# Patient Record
Sex: FEMALE | Race: BLACK OR AFRICAN AMERICAN | Employment: OTHER | URBAN - METROPOLITAN AREA
[De-identification: names, ages, dates, MRNs, and addresses within clinical notes are randomized per-mention and may not be internally consistent; named-entity substitution may affect disease eponyms.]

---

## 2017-10-24 ENCOUNTER — OFFICE VISIT (OUTPATIENT)
Dept: FAMILY MEDICINE CLINIC | Age: 57
End: 2017-10-24

## 2017-10-24 VITALS
TEMPERATURE: 97.6 F | HEART RATE: 83 BPM | WEIGHT: 203.2 LBS | BODY MASS INDEX: 37.39 KG/M2 | RESPIRATION RATE: 18 BRPM | OXYGEN SATURATION: 97 % | HEIGHT: 62 IN | SYSTOLIC BLOOD PRESSURE: 120 MMHG | DIASTOLIC BLOOD PRESSURE: 80 MMHG

## 2017-10-24 DIAGNOSIS — Z13.1 SCREENING FOR DIABETES MELLITUS: ICD-10-CM

## 2017-10-24 DIAGNOSIS — M06.9 RHEUMATOID ARTHRITIS, INVOLVING UNSPECIFIED SITE, UNSPECIFIED RHEUMATOID FACTOR PRESENCE: ICD-10-CM

## 2017-10-24 DIAGNOSIS — Z11.59 NEED FOR HEPATITIS C SCREENING TEST: ICD-10-CM

## 2017-10-24 DIAGNOSIS — Z13.220 SCREENING FOR LIPID DISORDERS: ICD-10-CM

## 2017-10-24 DIAGNOSIS — Z12.31 VISIT FOR SCREENING MAMMOGRAM: ICD-10-CM

## 2017-10-24 DIAGNOSIS — D64.9 ANEMIA, UNSPECIFIED TYPE: ICD-10-CM

## 2017-10-24 DIAGNOSIS — Z00.00 ROUTINE GENERAL MEDICAL EXAMINATION AT HEALTH CARE FACILITY: Primary | ICD-10-CM

## 2017-10-24 DIAGNOSIS — Z12.11 SCREENING FOR COLON CANCER: ICD-10-CM

## 2017-10-24 RX ORDER — FOLIC ACID 1 MG/1
1 TABLET ORAL DAILY
COMMUNITY
End: 2020-04-02 | Stop reason: SDUPTHER

## 2017-10-24 RX ORDER — LANOLIN ALCOHOL/MO/W.PET/CERES
325 CREAM (GRAM) TOPICAL AS NEEDED
COMMUNITY
End: 2018-03-27

## 2017-10-24 RX ORDER — METHOTREXATE 2.5 MG/1
20 TABLET ORAL
COMMUNITY
End: 2017-11-02 | Stop reason: SDUPTHER

## 2017-10-24 RX ORDER — MONTELUKAST SODIUM 10 MG/1
10 TABLET ORAL AS NEEDED
COMMUNITY
End: 2018-05-01

## 2017-10-24 RX ORDER — LANOLIN ALCOHOL/MO/W.PET/CERES
1000 CREAM (GRAM) TOPICAL AS NEEDED
COMMUNITY
End: 2018-03-27

## 2017-10-24 NOTE — PROGRESS NOTES
Patient Name: Neptali Marie   MRN: <[id number]>    Dk Leary is a 62 y.o. female who presents with the following: Here to establish care with new PCP. Cervical Cancer Screening: overdue, will refer to GYN. Is menopausal since 2010. Colon Cancer Screening: not up to date - FOBT ordered. Breast Cancer Screening: not up to date - ordered; had a false positive in her 42's and has not followed up since. Hep C: due     CAD risk factors:  HTN: wnl no meds; was on BP meds for one year in the past.  Lipid: due  DM: due    Hx of rheumatoid arthritis. Has seen a rheumatologist here who recommend switching from MTX to Enbrel but pt could not tolerate side effect of Enbrel. Currently taking methotrexate 20 mg every Monday but looking for a new rheumatologist.  Hx of anemia due to possibly low iron and low B12. Takes supplements sporadically. Review of Systems   Constitutional: Negative for chills, fever, malaise/fatigue and weight loss. HENT: Negative for hearing loss, nosebleeds and sore throat. Respiratory: Negative for cough, sputum production, shortness of breath and wheezing. Cardiovascular: Negative for chest pain, palpitations, leg swelling and PND. Gastrointestinal: Negative for abdominal pain, blood in stool, constipation, diarrhea, nausea and vomiting. Genitourinary: Negative for dysuria, frequency and urgency. Musculoskeletal: Negative for back pain, falls, joint pain, myalgias and neck pain. Skin: Negative for itching and rash. Neurological: Negative for dizziness, sensory change, focal weakness and loss of consciousness. Psychiatric/Behavioral: Negative for depression. The patient is not nervous/anxious. All other systems reviewed and are negative. The patient's medications, allergies, past medical history, surgical history, family history and social history were reviewed and updated where appropriate.       Prior to Admission medications Medication Sig Start Date End Date Taking? Authorizing Provider   methotrexate (RHEUMATREX) 2.5 mg tablet Take 20 mg by mouth every Monday. Yes Historical Provider   folic acid (FOLVITE) 1 mg tablet Take 1 mg by mouth daily. Indications: does not take on Mondays   Yes Historical Provider   montelukast (SINGULAIR) 10 mg tablet Take 10 mg by mouth as needed. Yes Historical Provider   Ibuprofen-diphenhydrAMINE (ADVIL PM) 200-38 mg tab Take 200 mg by mouth as needed. Yes Historical Provider   ferrous sulfate 325 mg (65 mg iron) tablet Take 325 mg by mouth as needed. Yes Historical Provider   cyanocobalamin 1,000 mcg tablet Take 1,000 mcg by mouth as needed. Yes Historical Provider   ginger, Zingiber officinalis, (GINGER EXTRACT) 250 mg cap Take 250 mg by mouth as needed. Yes Historical Provider   sennosides (SENNA) 8.6 mg cap Take 8.6 mg by mouth as needed. Yes Historical Provider       No Known Allergies      Past Medical History:   Diagnosis Date    Anemia     Rheumatoid arthritis ([facility] [address] 10/24/2017       History reviewed. No pertinent surgical history. Family History   Problem Relation Age of Onset    Heart Disease Father        Social History     Social History    Marital status:      Spouse name: N/A    Number of children: N/A    Years of education: N/A     Occupational History    Not on file.      Social History Main Topics    Smoking status: Former Smoker     Quit date: 2012    Smokeless tobacco: Never Used    Alcohol use No    Drug use: No    Sexual activity: No     Other Topics Concern    Not on file     Social History Narrative    No narrative on file           OBJECTIVE      Visit Vitals    /80 (BP 1 Location: Right arm, BP Patient Position: Sitting)    Pulse 83    Temp 97.6 °F (36.4 °C) (Oral)    Resp 18    Ht 5' 1.75\" (1.568 m)    Wt 203 lb 3.2 oz (92.2 kg)    SpO2 97%    BMI 37.47 kg/m2       Physical Exam   Constitutional: She is well-developed, well-nourished, and in no distress. No distress. HENT:   Head: Normocephalic and atraumatic. Right Ear: External ear normal.   Left Ear: External ear normal.   Eyes: Conjunctivae and EOM are normal. Pupils are equal, round, and reactive to light. Cardiovascular: Normal rate, regular rhythm and normal heart sounds. Exam reveals no gallop and no friction rub. No murmur heard. Pulmonary/Chest: Effort normal and breath sounds normal. No respiratory distress. She has no wheezes. Skin: She is not diaphoretic. Psychiatric: Mood, memory, affect and judgment normal.   Nursing note and vitals reviewed. ASSESSMENT AND PLAN  Brennen Winchester is a 62 y.o. female who presents today for:    1. Routine general medical examination at health care facility  Reviewed age appropriate screening tests as recommended by the [place]PSTF Preventive Services Database with patient today. 2. Screening for lipid disorders  Will calculate ASCVD risk score pending labs. - LIPID PANEL  - METABOLIC PANEL, COMPREHENSIVE    3. Screening for diabetes mellitus  - HEMOGLOBIN A1C WITH EAG    4. Need for hepatitis C screening test  - HCV AB W/RFLX TO CA    5. Visit for screening mammogram  - BALBIR MAMMO BI SCREENING INCL CAD; Future    6. Screening for colon cancer  - OCCULT BLOOD, IMMUNOASSAY (FIT)    7. Anemia, unspecified type  Stable, continue current treatment pending review of labs. - FERRITIN  - IRON PROFILE  - VITAMIN B12 & FOLATE  - CBC WITH AUTOMATED DIFF    8. Rheumatoid arthritis, involving unspecified site, unspecified rheumatoid factor presence ([facility] [place] [address]  Pt would like to meet with a new rheumatologist; pt may run out of MTX prior to establish care; stated I would be fine with refilling med in the interim.  - REFERRAL TO RHEUMATOLOGY       There are no discontinued medications. Follow-up Disposition:  Return in about 3 months (around 1/24/2018) for HTN follow up.     Medication risks/benefits/costs/interactions/alternatives discussed with patient. Advised patient to call back or return to office if symptoms worsen/change/persist. If patient cannot reach us or should anything more severe/urgent arise he/she should proceed directly to the nearest emergency department. Discussed expected course/resolution/complications of diagnosis in detail with patient. Patient given a written after visit summary which includes his/her diagnoses, current medications and vitals. Patient expressed understanding with the diagnosis and plan.      Racquel New M.D.

## 2017-10-24 NOTE — PATIENT INSTRUCTIONS
Well Visit, Women 48 to 72: Care Instructions  Your Care Instructions  Physical exams can help you stay healthy. Your doctor has checked your overall health and may have suggested ways to take good care of yourself. He or she also may have recommended tests. At home, you can help prevent illness with healthy eating, regular exercise, and other steps. Follow-up care is a key part of your treatment and safety. Be sure to make and go to all appointments, and call your doctor if you are having problems. It's also a good idea to know your test results and keep a list of the medicines you take. How can you care for yourself at home? · Reach and stay at a healthy weight. This will lower your risk for many problems, such as obesity, diabetes, heart disease, and high blood pressure. · Get at least 30 minutes of exercise on most days of the week. Walking is a good choice. You also may want to do other activities, such as running, swimming, cycling, or playing tennis or team sports. · Do not smoke. Smoking can make health problems worse. If you need help quitting, talk to your doctor about stop-smoking programs and medicines. These can increase your chances of quitting for good. · Protect your skin from too much sun. When you're outdoors from 10 a.m. to 4 p.m., stay in the shade or cover up with clothing and a hat with a wide brim. Wear sunglasses that block UV rays. Even when it's cloudy, put broad-spectrum sunscreen (SPF 30 or higher) on any exposed skin. · See a dentist one or two times a year for checkups and to have your teeth cleaned. · Wear a seat belt in the car. · Limit alcohol to 1 drink a day. Too much alcohol can cause health problems. Follow your doctor's advice about when to have certain tests. These tests can spot problems early. · Cholesterol.  Your doctor will tell you how often to have this done based on your age, family history, or other things that can increase your risk for heart attack and stroke. · Blood pressure. Have your blood pressure checked during a routine doctor visit. Your doctor will tell you how often to check your blood pressure based on your age, your blood pressure results, and other factors. · Mammogram. Ask your doctor how often you should have a mammogram, which is an X-ray of your breasts. A mammogram can spot breast cancer before it can be felt and when it is easiest to treat. · Pap test and pelvic exam. Ask your doctor how often you should have a Pap test. You may not need to have a Pap test as often as you used to. · Vision. Have your eyes checked every year or two or as often as your doctor suggests. Some experts recommend that you have yearly exams for glaucoma and other age-related eye problems starting at age 48. · Hearing. Tell your doctor if you notice any change in your hearing. You can have tests to find out how well you hear. · Diabetes. Ask your doctor whether you should have tests for diabetes. · Colon cancer. You should begin tests for colon cancer at age 48. You may have one of several tests. Your doctor will tell you how often to have tests based on your age and risk. Risks include whether you already had a precancerous polyp removed from your colon or whether your parents, sisters and brothers, or children have had colon cancer. · Thyroid disease. Talk to your doctor about whether to have your thyroid checked as part of a regular physical exam. Women have an increased chance of a thyroid problem. · Osteoporosis. You should begin tests for bone density at age 72. If you are younger than 72, ask your doctor whether you have factors that may increase your risk for this disease. You may want to have this test before age 72. · Heart attack and stroke risk. At least every 4 to 6 years, you should have your risk for heart attack and stroke assessed.  Your doctor uses factors such as your age, blood pressure, cholesterol, and whether you smoke or have diabetes to show what your risk for a heart attack or stroke is over the next 10 years. When should you call for help? Watch closely for changes in your health, and be sure to contact your doctor if you have any problems or symptoms that concern you. Where can you learn more? Go to http://saw-fifi.info/. Enter O260 in the search box to learn more about \"Well Visit, Women 50 to 72: Care Instructions. \"  Current as of: July 19, 2016  Content Version: 11.3  © 1149-0874 Healthwise, Incorporated. Care instructions adapted under license by [employer] (which disclaims liability or warranty for this information). If you have questions about a medical condition or this instruction, always ask your healthcare professional. [address] any warranty or liability for your use of this information.

## 2017-10-24 NOTE — PROGRESS NOTES
Chief Complaint   Patient presents with    New Patient    Complete Physical     1. Have you been to the ER, urgent care clinic since your last visit? Hospitalized since your last visit? No    2. Have you seen or consulted any other health care providers outside of the [address] [place] [address] since your last visit? Include any pap smears or colon screening. Yes, saw Dr. Farhad Vinson 06/17.

## 2017-10-24 NOTE — MR AVS SNAPSHOT
Visit Information Date & Time Provider Department Dept. Phone Encounter #  
 10/24/2017  2:45 PM Gretchen Cox MD [address] [place] [address] [phone] [id number] Follow-up Instructions Return in about 3 months (around 1/24/2018) for HTN follow up. Upcoming Health Maintenance Date Due Hepatitis C Screening 1960 DTaP/Tdap/Td series (1 - Tdap) 9/3/1981 PAP AKA CERVICAL CYTOLOGY 9/3/1981 BREAST CANCER SCRN MAMMOGRAM 9/3/2010 FOBT Q 1 YEAR AGE 50-75 9/3/2010 Allergies as of 10/24/2017  Review Complete On: 10/24/2017 By: Gretchen Cox MD  
 No Known Allergies Current Immunizations  Never Reviewed No immunizations on file. Not reviewed this visit You Were Diagnosed With   
  
 Codes Comments Routine general medical examination at health care facility    -  Primary ICD-10-CM: Z00.00 ICD-9-CM: V70.0 Screening for lipid disorders     ICD-10-CM: Z13.220 ICD-9-CM: V77.91 Screening for diabetes mellitus     ICD-10-CM: Z13.1 ICD-9-CM: V77.1 Need for hepatitis C screening test     ICD-10-CM: Z11.59 
ICD-9-CM: V73.89 Visit for screening mammogram     ICD-10-CM: Z12.31 
ICD-9-CM: V76.12 Screening for colon cancer     ICD-10-CM: Z12.11 ICD-9-CM: V76.51 Anemia, unspecified type     ICD-10-CM: D64.9 ICD-9-CM: 203. 9 Rheumatoid arthritis, involving unspecified site, unspecified rheumatoid factor presence ([facility]     ICD-10-CM: M06.9 ICD-9-CM: 714.0 Vitals BP Pulse Temp Resp Height(growth percentile) Weight(growth percentile) 120/80 (BP 1 Location: Right arm, BP Patient Position: Sitting) 83 97.6 °F (36.4 °C) (Oral) 18 5' 1.75\" (1.568 m) 203 lb 3.2 oz (92.2 kg) SpO2 BMI OB Status Smoking Status 97% 37.47 kg/m2 Menopause Former Smoker Vitals History BMI and BSA Data Body Mass Index Body Surface Area  
 37.47 kg/m 2 2 m 2 Preferred Pharmacy Pharmacy Name Phone [facility] [place], [address] [facility] [phone] Your Updated Medication List  
  
   
This list is accurate as of: 10/24/17  3:11 PM.  Always use your most recent med list. ADVIL -38 mg Tab Generic drug:  Ibuprofen-diphenhydrAMINE Take 200 mg by mouth as needed. cyanocobalamin 1,000 mcg tablet Take 1,000 mcg by mouth as needed. ferrous sulfate 325 mg (65 mg iron) tablet Take 325 mg by mouth as needed. folic acid 1 mg tablet Commonly known as:  [employer] Take 1 mg by mouth daily. Indications: does not take on Mondays GINGER EXTRACT 250 mg Cap Generic drug:  ginger (Zingiber officinalis) Take 250 mg by mouth as needed. methotrexate 2.5 mg tablet Commonly known as:  Skip Rachel Take 20 mg by mouth every Monday. Senna 8.6 mg Cap Generic drug:  sennosides Take 8.6 mg by mouth as needed. SINGULAIR 10 mg tablet Generic drug:  montelukast  
Take 10 mg by mouth as needed. We Performed the Following CBC WITH AUTOMATED DIFF [66234 CPT(R)] FERRITIN [78067 CPT(R)] HCV AB W/RFLX TO CA [49134 CPT(R)] HEMOGLOBIN A1C WITH EAG [31590 CPT(R)] IRON PROFILE [id number] CPT(R)] LIPID PANEL [41167 CPT(R)] METABOLIC PANEL, COMPREHENSIVE [79296 CPT(R)] OCCULT BLOOD, IMMUNOASSAY (FIT) [id number] CPT(R)] REFERRAL TO RHEUMATOLOGY [ORP84 Custom] Comments:  
 Please evaluate patient for RA. VITAMIN B12 & FOLATE [66476 CPT(R)] Follow-up Instructions Return in about 3 months (around 1/24/2018) for HTN follow up. To-Do List   
 10/24/2017 Imaging:  BALBIR MAMMO BI SCREENING INCL CAD Referral Information Referral ID Referred By Referred To  
  
 [id number] [place], [address] Armijo MD   
   [address], [address] Phone: [phone] Fax: [phone] Visits Status Start Date End Date 1 New Request 10/24/17 10/24/18 If your referral has a status of pending review or denied, additional information will be sent to support the outcome of this decision. Patient Instructions Well Visit, Women 48 to 72: Care Instructions Your Care Instructions Physical exams can help you stay healthy. Your doctor has checked your overall health and may have suggested ways to take good care of yourself. He or she also may have recommended tests. At home, you can help prevent illness with healthy eating, regular exercise, and other steps. Follow-up care is a key part of your treatment and safety. Be sure to make and go to all appointments, and call your doctor if you are having problems. It's also a good idea to know your test results and keep a list of the medicines you take. How can you care for yourself at home? · Reach and stay at a healthy weight. This will lower your risk for many problems, such as obesity, diabetes, heart disease, and high blood pressure. · Get at least 30 minutes of exercise on most days of the week. Walking is a good choice. You also may want to do other activities, such as running, swimming, cycling, or playing tennis or team sports. · Do not smoke. Smoking can make health problems worse. If you need help quitting, talk to your doctor about stop-smoking programs and medicines. These can increase your chances of quitting for good. · Protect your skin from too much sun. When you're outdoors from 10 a.m. to 4 p.m., stay in the shade or cover up with clothing and a hat with a wide brim. Wear sunglasses that block UV rays. Even when it's cloudy, put broad-spectrum sunscreen (SPF 30 or higher) on any exposed skin. · See a dentist one or two times a year for checkups and to have your teeth cleaned. · Wear a seat belt in the car. · Limit alcohol to 1 drink a day. Too much alcohol can cause health problems. Follow your doctor's advice about when to have certain tests. These tests can spot problems early. · Cholesterol. Your doctor will tell you how often to have this done based on your age, family history, or other things that can increase your risk for heart attack and stroke. · Blood pressure. Have your blood pressure checked during a routine doctor visit. Your doctor will tell you how often to check your blood pressure based on your age, your blood pressure results, and other factors. · Mammogram. Ask your doctor how often you should have a mammogram, which is an X-ray of your breasts. A mammogram can spot breast cancer before it can be felt and when it is easiest to treat. · Pap test and pelvic exam. Ask your doctor how often you should have a Pap test. You may not need to have a Pap test as often as you used to. · Vision. Have your eyes checked every year or two or as often as your doctor suggests. Some experts recommend that you have yearly exams for glaucoma and other age-related eye problems starting at age 48. · Hearing. Tell your doctor if you notice any change in your hearing. You can have tests to find out how well you hear. · Diabetes. Ask your doctor whether you should have tests for diabetes. · Colon cancer. You should begin tests for colon cancer at age 48. You may have one of several tests. Your doctor will tell you how often to have tests based on your age and risk. Risks include whether you already had a precancerous polyp removed from your colon or whether your parents, sisters and brothers, or children have had colon cancer. · Thyroid disease. Talk to your doctor about whether to have your thyroid checked as part of a regular physical exam. Women have an increased chance of a thyroid problem. · Osteoporosis. You should begin tests for bone density at age 72.  If you are younger than 72, ask your doctor whether you have factors that may increase your risk for this disease. You may want to have this test before age 72. · Heart attack and stroke risk. At least every 4 to 6 years, you should have your risk for heart attack and stroke assessed. Your doctor uses factors such as your age, blood pressure, cholesterol, and whether you smoke or have diabetes to show what your risk for a heart attack or stroke is over the next 10 years. When should you call for help? Watch closely for changes in your health, and be sure to contact your doctor if you have any problems or symptoms that concern you. Where can you learn more? Go to http://saw-fifi.info/. Enter H189 in the search box to learn more about \"Well Visit, Women 50 to 72: Care Instructions. \" Current as of: July 19, 2016 Content Version: 11.3 © 9080-3522 Healthwise, Incorporated. Care instructions adapted under license by [employer] (which disclaims liability or warranty for this information). If you have questions about a medical condition or this instruction, always ask your healthcare professional. [place] [address] any warranty or liability for your use of this information. Introducing [facility]! [facility] introduces [employer] patient portal. Now you can access parts of your medical record, email your doctor's office, and request medication refills online. 1. In your internet browser, go to [email or web address] 2. Click on the First Time User? Click Here link in the Sign In box. You will see the New Member Sign Up page. 3. Enter your [employer] Access Code exactly as it appears below. You will not need to use this code after youve completed the sign-up process. If you do not sign up before the expiration date, you must request a new code. · [employer] Access Code: [email or web address] Expires: 1/22/2018  3:00 PM 
 
4.  Enter the last four digits of your Social Security Number (xxxx) and Date of Birth (mm/dd/yyyy) as indicated and click Submit. You will be taken to the next sign-up page. 5. Create a Gift[employer] ID. This will be your [employer] login ID and cannot be changed, so think of one that is secure and easy to remember. 6. Create a [employer] password. You can change your password at any time. 7. Enter your Password Reset Question and Answer. This can be used at a later time if you forget your password. 8. Enter your e-mail address. You will receive e-mail notification when new information is available in [address] 9. Click Sign Up. You can now view and download portions of your medical record. 10. Click the Download Summary menu link to download a portable copy of your medical information. If you have questions, please visit the Frequently Asked Questions section of the [employer] website. Remember, [employer] is NOT to be used for urgent needs. For medical emergencies, dial 911. Now available from your iPhone and Android! Please provide this summary of care documentation to your next provider. Your primary care clinician is listed as Dex Denny. If you have any questions after today's visit, please call [phone].

## 2017-11-02 NOTE — TELEPHONE ENCOUNTER
Patient presented in office today requesting refill of methotrexate.     Patient has appointment with Dr. Yasmeen Samuel 12/27/2017 9:00 AM

## 2017-11-02 NOTE — TELEPHONE ENCOUNTER
Can you clarify which mg tabs pt has? I believe her current dose is 20 mg by mouth every Monday; if so, I can send in 10 mg tabs so she just needs to take 2 tabs at once (=20 mg). The chart says she takes 2.5 mg which would be 8 tabs at once but not sure if that is an error.

## 2017-11-03 ENCOUNTER — TELEPHONE (OUTPATIENT)
Dept: FAMILY MEDICINE CLINIC | Age: 57
End: 2017-11-03

## 2017-11-03 LAB
ALBUMIN SERPL-MCNC: 4 G/DL (ref 3.5–5.5)
ALBUMIN/GLOB SERPL: 1.4 {RATIO} (ref 1.2–2.2)
ALP SERPL-CCNC: 102 IU/L (ref 39–117)
ALT SERPL-CCNC: 14 IU/L (ref 0–32)
AST SERPL-CCNC: 14 IU/L (ref 0–40)
BASOPHILS # BLD AUTO: 0 X10E3/UL (ref 0–0.2)
BASOPHILS NFR BLD AUTO: 1 %
BILIRUB SERPL-MCNC: 0.5 MG/DL (ref 0–1.2)
BUN SERPL-MCNC: 17 MG/DL (ref 6–24)
BUN/CREAT SERPL: 19 (ref 9–23)
CALCIUM SERPL-MCNC: 9.1 MG/DL (ref 8.7–10.2)
CHLORIDE SERPL-SCNC: 102 MMOL/L (ref 96–106)
CHOLEST SERPL-MCNC: 224 MG/DL (ref 100–199)
CO2 SERPL-SCNC: 25 MMOL/L (ref 18–29)
CREAT SERPL-MCNC: 0.9 MG/DL (ref 0.57–1)
EOSINOPHIL # BLD AUTO: 0.2 X10E3/UL (ref 0–0.4)
EOSINOPHIL NFR BLD AUTO: 3 %
ERYTHROCYTE [DISTWIDTH] IN BLOOD BY AUTOMATED COUNT: 14.6 % (ref 12.3–15.4)
EST. AVERAGE GLUCOSE BLD GHB EST-MCNC: 100 MG/DL
FERRITIN SERPL-MCNC: 244 NG/ML (ref 15–150)
FOLATE SERPL-MCNC: 14.7 NG/ML
GFR SERPLBLD CREATININE-BSD FMLA CKD-EPI: 71 ML/MIN/1.73
GFR SERPLBLD CREATININE-BSD FMLA CKD-EPI: 82 ML/MIN/1.73
GLOBULIN SER CALC-MCNC: 2.8 G/DL (ref 1.5–4.5)
GLUCOSE SERPL-MCNC: 84 MG/DL (ref 65–99)
HBA1C MFR BLD: 5.1 % (ref 4.8–5.6)
HCT VFR BLD AUTO: 36.1 % (ref 34–46.6)
HCV AB S/CO SERPL IA: 0.1 S/CO RATIO (ref 0–0.9)
HCV AB SERPL QL IA: NORMAL
HDLC SERPL-MCNC: 57 MG/DL
HEMOCCULT STL QL IA: POSITIVE
HGB BLD-MCNC: 11.6 G/DL (ref 11.1–15.9)
IMM GRANULOCYTES # BLD: 0 X10E3/UL (ref 0–0.1)
IMM GRANULOCYTES NFR BLD: 0 %
INTERPRETATION, 910389: NORMAL
IRON SATN MFR SERPL: 25 % (ref 15–55)
IRON SERPL-MCNC: 71 UG/DL (ref 27–159)
LDLC SERPL CALC-MCNC: 149 MG/DL (ref 0–99)
LYMPHOCYTES # BLD AUTO: 0.9 X10E3/UL (ref 0.7–3.1)
LYMPHOCYTES NFR BLD AUTO: 14 %
MCH RBC QN AUTO: 29.4 PG (ref 26.6–33)
MCHC RBC AUTO-ENTMCNC: 32.1 G/DL (ref 31.5–35.7)
MCV RBC AUTO: 92 FL (ref 79–97)
MONOCYTES # BLD AUTO: 0.5 X10E3/UL (ref 0.1–0.9)
MONOCYTES NFR BLD AUTO: 8 %
NEUTROPHILS # BLD AUTO: 4.7 X10E3/UL (ref 1.4–7)
NEUTROPHILS NFR BLD AUTO: 74 %
PLATELET # BLD AUTO: 313 X10E3/UL (ref 150–379)
POTASSIUM SERPL-SCNC: 4.7 MMOL/L (ref 3.5–5.2)
PROT SERPL-MCNC: 6.8 G/DL (ref 6–8.5)
RBC # BLD AUTO: 3.94 X10E6/UL (ref 3.77–5.28)
SODIUM SERPL-SCNC: 142 MMOL/L (ref 134–144)
TIBC SERPL-MCNC: 285 UG/DL (ref 250–450)
TRIGL SERPL-MCNC: 90 MG/DL (ref 0–149)
UIBC SERPL-MCNC: 214 UG/DL (ref 131–425)
VIT B12 SERPL-MCNC: 548 PG/ML (ref 211–946)
VLDLC SERPL CALC-MCNC: 18 MG/DL (ref 5–40)
WBC # BLD AUTO: 6.2 X10E3/UL (ref 3.4–10.8)

## 2017-11-03 NOTE — PROGRESS NOTES
Please notify patient regarding their test results:    High total/LDL chol; I would encourage healthy diets and regular exercise with the goal of healthy weight loss before starting medications for this. Hep C negative. No DM. No anemia.

## 2017-11-03 NOTE — TELEPHONE ENCOUNTER
Called patient.  verified. Requested patient to stop by the office at her convenience, to sign an Authorization to Yolanda Watson  form so we can request some of her medical records. Patient said she will try to stop by next week.

## 2017-11-06 DIAGNOSIS — Z12.11 COLON CANCER SCREENING: Primary | ICD-10-CM

## 2017-11-06 DIAGNOSIS — R19.5 POSITIVE FECAL OCCULT BLOOD TEST: ICD-10-CM

## 2017-11-06 NOTE — PROGRESS NOTES
Please notify patient regarding their test results:    FOBT positive for blood; recommend GI referral (ordered)

## 2017-11-08 NOTE — PROGRESS NOTES
Called patient and verified . Informed patient of FOBT results and GI referral and the GI doctor\"s name,address, and telephone number. Dr. Thom Winkler. Patient stated she will schedule her appointment.

## 2017-11-08 NOTE — PROGRESS NOTES
Called Patient verified  and informed of lab results as ordered per Dr. Bard Tristan. Patient acknowledged understanding.

## 2017-12-12 ENCOUNTER — HOSPITAL ENCOUNTER (OUTPATIENT)
Dept: MAMMOGRAPHY | Age: 57
Discharge: HOME | End: 2017-12-12
Attending: FAMILY MEDICINE
Payer: MEDICARE

## 2017-12-12 DIAGNOSIS — Z12.31 VISIT FOR SCREENING MAMMOGRAM: ICD-10-CM

## 2017-12-12 PROCEDURE — 77067 SCR MAMMO BI INCL CAD: CPT

## 2017-12-27 ENCOUNTER — OFFICE VISIT (OUTPATIENT)
Dept: RHEUMATOLOGY | Age: 57
End: 2017-12-27

## 2017-12-27 VITALS
SYSTOLIC BLOOD PRESSURE: 124 MMHG | WEIGHT: 204 LBS | BODY MASS INDEX: 40.05 KG/M2 | HEIGHT: 60 IN | TEMPERATURE: 98.2 F | DIASTOLIC BLOOD PRESSURE: 86 MMHG | RESPIRATION RATE: 18 BRPM | HEART RATE: 83 BPM

## 2017-12-27 DIAGNOSIS — K92.1 HEMATOCHEZIA: ICD-10-CM

## 2017-12-27 DIAGNOSIS — Z79.60 LONG-TERM USE OF IMMUNOSUPPRESSANT MEDICATION: ICD-10-CM

## 2017-12-27 DIAGNOSIS — M06.9 RHEUMATOID ARTHRITIS WITH UNKNOWN RHEUMATOID FACTOR STATUS (HCC): Primary | ICD-10-CM

## 2017-12-27 DIAGNOSIS — M17.0 PRIMARY OSTEOARTHRITIS OF BOTH KNEES: ICD-10-CM

## 2017-12-27 DIAGNOSIS — M25.521 RIGHT ELBOW PAIN: ICD-10-CM

## 2017-12-27 RX ORDER — METHOTREXATE 2.5 MG/1
20 TABLET ORAL
Qty: 96 TAB | Refills: 0 | Status: SHIPPED | OUTPATIENT
Start: 2018-01-01 | End: 2017-12-29 | Stop reason: SDUPTHER

## 2017-12-27 RX ORDER — TRIAMCINOLONE ACETONIDE 40 MG/ML
40 INJECTION, SUSPENSION INTRA-ARTICULAR; INTRAMUSCULAR ONCE
Qty: 1 ML | Refills: 0
Start: 2017-12-27 | End: 2017-12-27

## 2017-12-27 RX ORDER — LIDOCAINE HYDROCHLORIDE 10 MG/ML
1 INJECTION, SOLUTION EPIDURAL; INFILTRATION; INTRACAUDAL; PERINEURAL ONCE
Qty: 1 ML | Refills: 0
Start: 2017-12-27 | End: 2017-12-27

## 2017-12-27 RX ORDER — METHOTREXATE 2.5 MG/1
20 TABLET ORAL
COMMUNITY
End: 2017-12-27 | Stop reason: SDUPTHER

## 2017-12-27 NOTE — MR AVS SNAPSHOT
Visit Information Date & Time Provider Department Dept. Phone Encounter #  
 12/27/2017  9:00 AM Mp Reyes, [address] [place] [id number] Follow-up Instructions Return in about 3 months (around 3/27/2018). Your Appointments 1/25/2018  9:45 AM  
ROUTINE CARE with Nathan Amado MD  
[facility]) Appt Note: 6 month follow up visit; 3 mo follow up  
 [phone]  
[phone]  
  
   
 [address] [phone] Upcoming Health Maintenance Date Due DTaP/Tdap/Td series (1 - Tdap) 9/3/1981 PAP AKA CERVICAL CYTOLOGY 9/3/1981 FOBT Q 1 YEAR AGE 50-75 11/2/2018 Allergies as of 12/27/2017  Review Complete On: 12/27/2017 By: Mp Reyes MD  
 No Known Allergies Current Immunizations  Never Reviewed No immunizations on file. Not reviewed this visit You Were Diagnosed With   
  
 Codes Comments Rheumatoid arthritis with unknown [facility] factor [facility]    -  Primary ICD-10-CM: M06.9 ICD-9-CM: 714.0 Long-term use of immunosuppressant medication     ICD-10-CM: Z79.899 ICD-9-CM: V58.69 Right elbow pain     ICD-10-CM: M25.521 ICD-9-CM: 719.42 Vitals BP Pulse Temp Resp Height(growth percentile) Weight(growth percentile) 124/86 (BP 1 Location: Right arm, BP Patient Position: Sitting) 83 98.2 °F (36.8 °C) 18 5' (1.524 m) 204 lb (92.5 kg) BMI OB Status Smoking Status 39.84 kg/m2 Menopause Former Smoker BMI and BSA Data Body Mass Index Body Surface Area  
 39.84 kg/m 2 1.98 m 2 Preferred Pharmacy Pharmacy Name Phone [facility] [address] [phone] Your Updated Medication List  
  
   
This list is accurate as of: 12/27/17  9:41 AM.  Always use your most recent med list.  
  
  
  
  
 ADVIL -38 mg Tab Generic drug:  Ibuprofen-diphenhydrAMINE Take 200 mg by mouth as needed. cyanocobalamin 1,000 mcg tablet Take 1,000 mcg by mouth as needed. ferrous sulfate 325 mg (65 mg iron) tablet Take 325 mg by mouth as needed. folic acid 1 mg tablet Commonly known as:  Google Take 1 mg by mouth daily. Indications: does not take on Mondays  
  
 lidocaine (PF) 10 mg/mL (1 %) injection Commonly known as:  XYLOCAINE  
1 mL by Intra artICUlar route once for 1 dose. methotrexate 2.5 mg tablet Commonly known as:  Willma Bellis Take 20 mg by mouth every Monday. Senna 8.6 mg Cap Generic drug:  sennosides Take 8.6 mg by mouth as needed. SINGULAIR 10 mg tablet Generic drug:  montelukast  
Take 10 mg by mouth as needed. tofacitinib 11 mg Tb24 Commonly known as:  XELJANZ XR Take 11 mg by mouth daily for 30 days. triamcinolone acetonide 40 mg/mL injection Commonly known as:  KENALOG  
1 mL by Intra artICUlar route once for 1 dose. Prescriptions Sent to Pharmacy Refills  
 tofacitinib (XELJANZ XR) 11 mg Tb24 11 Sig: Take 11 mg by mouth daily for 30 days. Class: Normal  
 Pharmacy: [address] Ph #: [phone] Route: Oral  
  
We Performed the Following C REACTIVE PROTEIN, QT [27655 CPT(R)] CBC WITH AUTOMATED DIFF [71033 CPT(R)] CHRONIC HEPATITIS PANEL [PTF7752 Custom] [address], IGG [id number] CPT(R)] METABOLIC PANEL, COMPREHENSIVE [64825 CPT(R)] ME ARTHROCENTESIS ASPIR&/INJ INTERM JT/BURS W/US [80782 CPT(R)] PROTEIN ELECTROPHORESIS W/ REFLX CARLITO [[id number] Custom] QUANTIFERON TB GOLD [[id number] Custom] RHEUMATOID FACTOR, QL [id number] CPT(R)] SED RATE (ESR) R7007837 CPT(R)] TRIAMCINOLONE ACETONIDE INJ [ [facility]] URIC ACID Z4936913 CPT(R)] VITAMIN D, 25 HYDROXY W1926020 CPT(R)] Follow-up Instructions Return in about 3 months (around 3/27/2018). To-Do List   
 12/27/2017 Imaging:  XR ELBOW RT MIN 3 V   
  
 12/27/2017 Imaging:  XR FOOT LT MIN 3 V   
  
 12/27/2017 Imaging:  XR FOOT RT MIN 3 V   
  
 12/27/2017 Imaging:  XR HAND LT MIN 3 V   
  
 12/27/2017 Imaging:  XR HAND RT MIN 3 V Introducing hospitals & HEALTH SERVICES! Dear Kaz Albert: 
Thank you for requesting a [employer] account. Our records indicate that you already have an active Mobile-[employer] account. You can access your account anytime at [email or web address] Did you know that you can access your hospital and ER discharge instructions at any time in [employer]? You can also review all of your test results from your hospital stay or ER visit. Additional Information If you have questions, please visit the Frequently Asked Questions section of the Mobile-[employer] website at [email or web address] Remember, [employer] is NOT to be used for urgent needs. For medical emergencies, dial 911. Now available from your iPhone and Android! Please provide this summary of care documentation to your next provider. Your primary care clinician is listed as Dex Denny. If you have any questions after today's visit, please call [phone].

## 2017-12-27 NOTE — PROGRESS NOTES
REASON FOR VISIT    This is the initial evaluation for Ms. Roe a 62 y.o. Native [place] and [place] American female for question of an inflammatory arthritis. The patient is referred to the Arthritis and [address] [place] [address] at the request of Dr. Noelle Gooden. HISTORY OF PRESENT ILLNESS      I have reviewed and summarized old records from [facility]    In 3/2015, she developed right shoulder that progressed to the other other arm amd bilateral ankle pain. She could not lift her arms above her head. She denied swelling. She has seen a rheumatologist in [facility] who diagnosed her with Rheumatoid Arthritis, who started her on methotrexate 20 mg weekly with good tolerance. In 2016, she moved back to [place] and had established care with a rheumatologist, who started her on Enbrel due bad radiographs but she reports not feeling well on it but cannot explain how she felt on it. She had taken it for 6 weeks and noted injection site bruising and increasing coughing. She notes the coughing improved after stopping Enbrel. She has had a history of intermittent numbness in the balls of her right foot which improves when she wears a copper sleeve. In 11/02/2017, labs showed WBC 6.2, lymphocytes 0.9, Hct 36.1%, platelets 239,117, creatinine 0.90 mg/dL, eGFR 82, albumin 4.0 g/dL,  U/L, ALT 14 U/L, AST 14 U/L, negative HCV. She was a smoker. She is scheduling for a colonoscopy next month. Today, she complains of aching pain in her right shoulder down to her hand. She feels that her right hand and arm is swollen. If she tried to put force like opening an object, she has pain in her forearm. She denies stiffness. She has had this for months. She has not used any NSAIDs.      Therapy History includes:    Current DMARD therapy includes: methotrexate 20 mg every Monday  Prior DMARD therapy includes: Enbrel (6 weeks)  The following DMARDs have been ineffective: none  The following DMARDs were stopped because of side effects: Enbrel (injection site reaction, increased cough, other unknown)    REVIEW OF SYSTEMS    A 15 point review of systems was performed and summarized below. The questionnaire was reviewed with the patient and scanned into the patient's medical record.     General: denies recent weight gain, recent weight loss, fatigue, weakness, fever, night sweats  Musculoskeletal: endorses joint pain, joint swelling, denies morning stiffness, muscle weakness  Ears: endorses ringing in ears, denies loss of hearing, deafness  Eyes: endorses foreign body sensation, denies pain, redness, loss of vision, double vision, blurred vision, dryness  Mouth: denies sore tongue, oral ulcers, bleeding gums, loss of taste, dryness, increased dental caries  Nose: denies nosebleeds, loss of smell, nasal ulcers  Throat: denies frequent sore throats, hoarseness, difficulty in swallowing, pain in jaw while chewing  Neck: denies swollen glands, tender glands  Cardiopulmonary: endorses dry cough, denies pain in chest, irregular heart beat, sudden changes in heart beat, shortness of breath, difficulty breathing at night, swollen legs or feet, coughing of blood, wheezing  Gastrointestinal: endorses blood in stools,denies nausea, heartburn, stomach pain relieved by food, vomiting of blood/\"coffee grounds\", jaundice, increasing constipation, persistent diarrhea,  black stools  Genitourinary: endorses getting up at night to pass urine, denies difficult urination, pain or burning on urination, blood in urine, cloudy urine, pus in urine, genital discharge, frequent urination, vaginal dryness, rash/ulcers, sexual difficulties   Hematologic: denies anemia, bleeding tendency, blood clots  Skin: endorses easy bruising, denies redness, rash, hives, sun sensitive, skin tightness, nodules/bumps, hair loss, color changes of hands or feet in the cold (Raynaud's)  Neurologic: endorses intermittent numbness or tingling in right foot feet, denies headaches, dizziness, fainting or loss of consciousness, memory loss, muscle weakness  Psychiatric: denies depression, excessive worries  Sleep: endorses poor sleep (5 hours), snoring, denies apnea, daytime somnolence, difficulty falling asleep, difficulty staying asleep     PAST MEDICAL HISTORY    She has a past medical history of Anemia; Reactive airway disease; and Rheumatoid arthritis ([facility] (10/24/2017). FAMILY HISTORY    Her family history includes Heart Disease in her father; High Cholesterol in her mother; Thyroid Disease in her mother. SOCIAL HISTORY    She reports that she quit smoking about 5 years ago. She has never used smokeless tobacco. She reports that she does not drink alcohol or use illicit drugs. GYNECOLOGIC HISTORY     5, Para 5, Living 5, Miscarriage 0    She denies severe pre-eclampsia, eclampsia or placental insufficiency    HEALTH MAINTENANCE    Immunizations    There is no immunization history on file for this patient. Age Appropriate Cancer Screening    Mammogram: 2017    MEDICATIONS    Current Outpatient Prescriptions   Medication Sig Dispense Refill    triamcinolone acetonide (KENALOG) 40 mg/mL injection 1 mL by Intra artICUlar route once for 1 dose. 1 mL 0    lidocaine, PF, (XYLOCAINE) 10 mg/mL (1 %) injection 1 mL by Intra artICUlar route once for 1 dose. 1 mL 0    tofacitinib (XELJANZ XR) 11 mg Tb24 Take 11 mg by mouth daily for 30 days. 30 Tab 11    [START ON 2018] methotrexate (RHEUMATREX) 2.5 mg tablet Take 8 Tabs by mouth every Monday for 90 days. 96 Tab 0    folic acid (FOLVITE) 1 mg tablet Take 1 mg by mouth daily. Indications: does not take on       montelukast (SINGULAIR) 10 mg tablet Take 10 mg by mouth as needed.  Ibuprofen-diphenhydrAMINE (ADVIL PM) 200-38 mg tab Take 200 mg by mouth as needed.  ferrous sulfate 325 mg (65 mg iron) tablet Take 325 mg by mouth as needed.       cyanocobalamin 1,000 mcg tablet Take 1,000 mcg by mouth as needed.  sennosides (SENNA) 8.6 mg cap Take 8.6 mg by mouth as needed. ALLERGIES    No Known Allergies    PHYSICAL EXAMINATION    Visit Vitals    /86 (BP 1 Location: Right arm, BP Patient Position: Sitting)    Pulse 83    Temp 98.2 °F (36.8 °C)    Resp 18    Ht 5' (1.524 m)    Wt 204 lb (92.5 kg)    BMI 39.84 kg/m2     Body mass index is 39.84 kg/(m^2). General: Patient is alert, oriented x 3, not in acute distress    HEENT:   Conjunctiva are not injected and appear moist, oral mucous membranes are moist, there are no ulcers present, there is no alopecia, neck is supple, there is no lymphadenopathy. Salivary glands are normal    Cardiovascular:  Heart is regular rate and rhythm, no murmurs. Chest:  Lungs are clear to auscultation bilaterally. Abdomen:  Soft, non-tender    Extremities:  Free of clubbing, cyanosis, edema, extremities well perfused. Neurological exam:  No focal sensory deficits, muscle strength is full in upper and lower extremities. Skin exam:  There are no rashes, no tophi, no psoriasis, no active Raynaud's, no livedo reticularis, no periungual erythema. Musculoskeletal exam:  A comprehensive musculoskeletal exam was performed for all joints of each upper and lower extremity and assessed for swelling, tenderness and range of motion.  Pertinent results are documented as below:    Decreased active and passive ROM of right shoulder due to pain  Right elbow flexion contracture    Bilateral knee crepitus without effusion with hyperextension    Z-Deformities:   no  [place] Neck Deformities:  no  Boutonierre's Deformities:  no  Ulnar Deviation:   no  MCP Subluxation:  no    Joint Count 12/27/2017   Patient pain (0-100) 25   MHAQ 0.125   Right elbow - Tender 1   Right elbow - Swollen 1   Tender Joint Count (Total) 1   Swollen Joint Count (Total) 1   Physician Assessment (0-10) 1   Patient Assessment (0-10) 3   CDAI Total (calculated) 6 MUSCULOSKELETAL ULTRASOUND EVALUATION    Ultrasound of the right elbow. Indication: joint pain. (12/27/17)    Using a MBDC Mediaiq e with 12 Mhz probe, limited views of the right elbow were reviewed. This revealed hypoechoic dopplerable collection and anechoic collection within the olecranon fossa. The tendons were normal. Bony contours were regular without erosions seen. There were no soft tissue masses noted. Impression: right elbow synovitis with effusion    Musculoskeletal Ultrasound Procedure Note. Ultrasound Guided Joint aspiration/Injection. Indication: Right elbow Joint swelling/Pain    The patient was advised about the possible risks of the procedure including pain, bleeding, infection and lack of benefit. After obtaining verbal and written informed consent and time out performed, the area was prepped in a sterile fashion with chlorprep scrub. The skin was sprayed with Ethyl Chloride followed by insertion of 25 gauge needle into the subcutaneous tissue. The ultrasound probe was then placed on the sterile surface with sterile gel and under direct in-plane visualization the needle was inserted into joint space and 0 mL of serous fluid was obtained. 40 mg of Kenalog (triaminolone) mixed with 1% 1mL lidocaine was injected into the joint. The area was bandaged following the procedure and no acute complications were noted. The patient was advised to ice the area overnight and avoid strenuous activity for up to 72 hours. She will be contacting us should there be any fevers, increased pain or swelling suggestive of an infection. DATA REVIEW    Prior medical records were reviewed and are summarized as below:    Laboratory data: summarized in the HPI    Imaging: none       ASSESSMENT AND PLAN    1) Rheumatoid Arthritis. She has a several history of Rheumatoid Arthritis and was treated with methotrexate.  Recently, Enbrel was added due to ongoing right elbow synovitis and apparently worsening radiographs - not available to me. She reports not tolerating Enbrel but cannot explain why. She did have injection site reactions and worsening in chronic cough that improved when she stopped. Today, her CDAI was 6 with 1 tender and 1 swollen joints. I injected her right elbow under ultrasound guidance with good tolerance and relief. I discussed with about: (1) changing methotrexate to injection, (2) triple therapy DMARDs, and (3) about advancing therapy to the biologic (small particle, anti-TNF). We discussed the potential adverse effects, which include infections, and routes of administration (oral versus infusion versus subcutaneous). The patient was informed these medications co-pay are subject to the patient's insurance coverage and we will not know until it has been submitted to the insurance company. She did not want to do injections or infusions. She preferred trying Emmit Perfect and if that is denied or too expensive try triple therapy. An order will be submitted today. Les Carrier was filled out today and will be submitted after her Quantiferon TB and viral hepatitis panel are released and negative. I will give her samples at that time as well. I ordered labs and radiographs today. I refilled methotrexate. 2) Long Term Use of Immunosuppressants. The patient remains on immunomodulatory medications (methotrexate) and requires frequent toxicity monitoring by blood work. Respective labs were ordered (CBC and CMP). 3) Bilateral Knee Osteoarthritis. The patient has osteoarthritis, which is also known as \"wear and tear arthritis,\" non-inflammatory arthritis or mechanical arthritis. There are hereditary, vocational and posttraumatic joint injuries predisposing factors.  I recommend maintaining a healthy weight to slow the progression of osteoarthritis in addition to following the [employer] of Rheumatology Osteoarthritis Treatment Guidelines: (1) non-pharmacologic modalities such as aerobic, aquatic, and/or resistance exercises as well as weight loss for overweight patients; in addition to (2) pharmacologic modalities such as acetaminophen as first line, oral and topical NSAIDs as second line, tramadol as third line and intra-articular corticosteroid injections as fourth line (Manuel MC, et al. Arthritis Care Res [facility] [facility]). 2012;64(4):465). Naproxen is a low WEBB-2 selectivity inhibitor that poses lower cardiovascular risk than other NSAIDs (Angiolexx DJ, Margarita SCHNEIDER. Clinical Pharmacology and Cardiovascular Safety of Naproxen. Am J Cardiovasc Drugs. 2016 Nov 8). NSAIDs should not be used in patients on blood thinners, chronic kidney disease, high risk coronary artery disease, and inflammatory bowel disease (ulcerative colitis or Crohn's disease) Joint replacement surgery if all previous fail, knowing that there is a 10 year lifespan per prosthesis. I recommend weight loss because every 1 lb of extra weight is approximately 5 lbs of extra weight on the knees. 4) Hematochezia. She is scheduled for a colonoscopy. The patient voiced understanding of the aforementioned assessment and plan. Summary of plan was provided in the After Visit Summary patient instructions. I also provided education about ITS Compliancehart setup and utility.     TODAY'S ORDERS    Orders Placed This Encounter    QUANTIFERON TB GOLD    XR ELBOW RT MIN 3 V    XR HAND LT MIN 3 V    XR HAND RT MIN 3 V    XR FOOT RT MIN 3 V    XR FOOT LT MIN 3 V    CYCLIC CITRUL PEPTIDE AB, IGG    CHRONIC HEPATITIS PANEL    C REACTIVE PROTEIN, QT    SED RATE (ESR)    RHEUMATOID FACTOR, QL    PROTEIN ELECTROPHORESIS W/ REFLX ACRLITO    URIC ACID    VITAMIN D, 25 HYDROXY    CBC WITH AUTOMATED DIFF    METABOLIC PANEL, COMPREHENSIVE    CHG US,EXTREMITY,NONVASCULAR,REAL-TIME IMAGE,LIMITED    TRIAMCINOLONE ACETONIDE INJ    20606 - DRAIN/INJECT INTERMEDIATE JOINT/BURSA WITH US    triamcinolone acetonide (KENALOG) 40 mg/mL injection    lidocaine, PF, (XYLOCAINE) 10 mg/mL (1 %) injection    tofacitinib (XELJANZ XR) 11 mg Tb24    methotrexate (RHEUMATREX) 2.5 mg tablet       Future Appointments  Date Time Provider Yolanda Mullinsi   1/25/2018 9:45 AM Kathy Mcgrath MD [facility]A SAMUEL   3/27/2018 10:20 AM MD Vinny Caldwell Mai, MD, [address] [facility]    Adult Rheumatology   Musculoskeletal Ultrasound Certified  [address] [place] [address]   [id number] [place] [address]   Phone [phone]  Fax [phone]

## 2017-12-29 ENCOUNTER — TELEPHONE (OUTPATIENT)
Dept: RHEUMATOLOGY | Age: 57
End: 2017-12-29

## 2017-12-29 DIAGNOSIS — M06.9 RHEUMATOID ARTHRITIS WITH UNKNOWN RHEUMATOID FACTOR STATUS (HCC): ICD-10-CM

## 2017-12-29 LAB
25(OH)D3+25(OH)D2 SERPL-MCNC: 21.3 NG/ML (ref 30–100)
ALBUMIN SERPL ELPH-MCNC: 3.6 G/DL (ref 2.9–4.4)
ALBUMIN SERPL-MCNC: 4.4 G/DL (ref 3.5–5.5)
ALBUMIN/GLOB SERPL: 1.1 {RATIO} (ref 0.7–1.7)
ALBUMIN/GLOB SERPL: 1.8 {RATIO} (ref 1.2–2.2)
ALP SERPL-CCNC: 117 IU/L (ref 39–117)
ALPHA1 GLOB SERPL ELPH-MCNC: 0.3 G/DL (ref 0–0.4)
ALPHA2 GLOB SERPL ELPH-MCNC: 0.8 G/DL (ref 0.4–1)
ALT SERPL-CCNC: 15 IU/L (ref 0–32)
ANNOTATION COMMENT IMP: ABNORMAL
AST SERPL-CCNC: 19 IU/L (ref 0–40)
B-GLOBULIN SERPL ELPH-MCNC: 1.1 G/DL (ref 0.7–1.3)
BASOPHILS # BLD AUTO: 0 X10E3/UL (ref 0–0.2)
BASOPHILS NFR BLD AUTO: 0 %
BILIRUB SERPL-MCNC: 0.3 MG/DL (ref 0–1.2)
BUN SERPL-MCNC: 21 MG/DL (ref 6–24)
BUN/CREAT SERPL: 25 (ref 9–23)
CALCIUM SERPL-MCNC: 9.4 MG/DL (ref 8.7–10.2)
CCP IGA+IGG SERPL IA-ACNC: >250 UNITS (ref 0–19)
CHLORIDE SERPL-SCNC: 102 MMOL/L (ref 96–106)
CO2 SERPL-SCNC: 25 MMOL/L (ref 18–29)
COMMENT, 144067: NORMAL
CREAT SERPL-MCNC: 0.83 MG/DL (ref 0.57–1)
CRP SERPL-MCNC: 9.5 MG/L (ref 0–4.9)
EOSINOPHIL # BLD AUTO: 0.1 X10E3/UL (ref 0–0.4)
EOSINOPHIL NFR BLD AUTO: 2 %
ERYTHROCYTE [DISTWIDTH] IN BLOOD BY AUTOMATED COUNT: 15.2 % (ref 12.3–15.4)
ERYTHROCYTE [SEDIMENTATION RATE] IN BLOOD BY WESTERGREN METHOD: 41 MM/HR (ref 0–40)
GAMMA GLOB SERPL ELPH-MCNC: 1.1 G/DL (ref 0.4–1.8)
GAMMA INTERFERON BACKGROUND BLD IA-ACNC: 0.03 IU/ML
GLOBULIN SER CALC-MCNC: 2.5 G/DL (ref 1.5–4.5)
GLOBULIN SER CALC-MCNC: 3.3 G/DL (ref 2.2–3.9)
GLUCOSE SERPL-MCNC: 72 MG/DL (ref 65–99)
HBV CORE AB SERPL QL IA: NEGATIVE
HBV CORE IGM SERPL QL IA: NEGATIVE
HBV E AB SERPL QL IA: NEGATIVE
HBV E AG SERPL QL IA: NEGATIVE
HBV SURFACE AB SER QL: NON REACTIVE
HBV SURFACE AG SERPL QL IA: NEGATIVE
HCT VFR BLD AUTO: 37 % (ref 34–46.6)
HCV AB S/CO SERPL IA: <0.1 S/CO RATIO (ref 0–0.9)
HGB BLD-MCNC: 12.2 G/DL (ref 11.1–15.9)
IMM GRANULOCYTES # BLD: 0 X10E3/UL (ref 0–0.1)
IMM GRANULOCYTES NFR BLD: 0 %
LYMPHOCYTES # BLD AUTO: 0.8 X10E3/UL (ref 0.7–3.1)
LYMPHOCYTES NFR BLD AUTO: 14 %
M PROTEIN SERPL ELPH-MCNC: NORMAL G/DL
M TB IFN-G BLD-IMP: ABNORMAL
M TB IFN-G CD4+ BCKGRND COR BLD-ACNC: 0 IU/ML
M TB IFN-G CD4+ T-CELLS BLD-ACNC: 0.03 IU/ML
MCH RBC QN AUTO: 30.2 PG (ref 26.6–33)
MCHC RBC AUTO-ENTMCNC: 33 G/DL (ref 31.5–35.7)
MCV RBC AUTO: 92 FL (ref 79–97)
MITOGEN IGNF BLD-ACNC: 0.48 IU/ML
MONOCYTES # BLD AUTO: 0.5 X10E3/UL (ref 0.1–0.9)
MONOCYTES NFR BLD AUTO: 9 %
NEUTROPHILS # BLD AUTO: 4.6 X10E3/UL (ref 1.4–7)
NEUTROPHILS NFR BLD AUTO: 75 %
PLATELET # BLD AUTO: 318 X10E3/UL (ref 150–379)
PLEASE NOTE, 011150: NORMAL
POTASSIUM SERPL-SCNC: 4.5 MMOL/L (ref 3.5–5.2)
PROT PATTERN SERPL ELPH-IMP: NORMAL
PROT SERPL-MCNC: 6.9 G/DL (ref 6–8.5)
QUANTIFERON INCUBATION: NORMAL
RBC # BLD AUTO: 4.04 X10E6/UL (ref 3.77–5.28)
RHEUMATOID FACT SERPL-ACNC: 51.6 IU/ML (ref 0–13.9)
SERVICE CMNT-IMP: ABNORMAL
SODIUM SERPL-SCNC: 142 MMOL/L (ref 134–144)
URATE SERPL-MCNC: 4.7 MG/DL (ref 2.5–7.1)
WBC # BLD AUTO: 6.1 X10E3/UL (ref 3.4–10.8)

## 2017-12-29 RX ORDER — METHOTREXATE 2.5 MG/1
20 TABLET ORAL
Qty: 96 TAB | Refills: 0 | Status: SHIPPED | OUTPATIENT
Start: 2018-01-01 | End: 2018-03-27 | Stop reason: SDUPTHER

## 2017-12-29 NOTE — TELEPHONE ENCOUNTER
Patient is calling due to she needs a refill of Methotrexate. She would like to have this by Monday if possible. Her phone is [phone].

## 2017-12-29 NOTE — TELEPHONE ENCOUNTER
I responded to pt via [employer] and told her that we will send in the script later today. She responded that she got the message.

## 2018-01-02 RX ORDER — ERGOCALCIFEROL 1.25 MG/1
50000 CAPSULE ORAL
Qty: 12 CAP | Refills: 3 | Status: SHIPPED | OUTPATIENT
Start: 2018-01-02 | End: 2018-05-01

## 2018-01-08 ENCOUNTER — CLINICAL SUPPORT (OUTPATIENT)
Dept: RHEUMATOLOGY | Age: 58
End: 2018-01-08

## 2018-01-08 DIAGNOSIS — Z11.1 SCREENING FOR TUBERCULOSIS: ICD-10-CM

## 2018-01-08 DIAGNOSIS — M06.9 RHEUMATOID ARTHRITIS, INVOLVING UNSPECIFIED SITE, UNSPECIFIED RHEUMATOID FACTOR PRESENCE: Primary | ICD-10-CM

## 2018-01-08 DIAGNOSIS — Z79.60 LONG-TERM USE OF IMMUNOSUPPRESSANT MEDICATION: ICD-10-CM

## 2018-01-08 NOTE — PROGRESS NOTES
Pt came to office to have PPD placed in her left forearm since her quantiferon lab was indeterminate. Pt was instructed to return in 48 hrs to have the PPD test read. She stated an understanding.

## 2018-01-10 LAB
MM INDURATION POC: 0 MM (ref 0–5)
PPD POC: NORMAL NEGATIVE

## 2018-01-15 ENCOUNTER — ANESTHESIA EVENT (OUTPATIENT)
Dept: ENDOSCOPY | Age: 58
End: 2018-01-15
Payer: MEDICARE

## 2018-01-15 ENCOUNTER — HOSPITAL ENCOUNTER (OUTPATIENT)
Age: 58
Setting detail: OUTPATIENT SURGERY
Discharge: HOME | End: 2018-01-15
Attending: INTERNAL MEDICINE | Admitting: INTERNAL MEDICINE
Payer: MEDICARE

## 2018-01-15 ENCOUNTER — ANESTHESIA (OUTPATIENT)
Dept: ENDOSCOPY | Age: 58
End: 2018-01-15
Payer: MEDICARE

## 2018-01-15 VITALS
DIASTOLIC BLOOD PRESSURE: 49 MMHG | HEART RATE: 55 BPM | RESPIRATION RATE: 14 BRPM | OXYGEN SATURATION: 97 % | BODY MASS INDEX: 36.8 KG/M2 | WEIGHT: 200 LBS | SYSTOLIC BLOOD PRESSURE: 110 MMHG | HEIGHT: 62 IN | TEMPERATURE: 97.8 F

## 2018-01-15 LAB — COLONOSCOPY, EXTERNAL: NORMAL

## 2018-01-15 PROCEDURE — 76060000031 HC ANESTHESIA FIRST 0.5 HR: Performed by: INTERNAL MEDICINE

## 2018-01-15 PROCEDURE — 76040000019: Performed by: INTERNAL MEDICINE

## 2018-01-15 PROCEDURE — 77030027957 HC TBNG IRR ENDOGTR BUSS -B: Performed by: INTERNAL MEDICINE

## 2018-01-15 PROCEDURE — 74011250636 HC RX REV CODE- 250/636

## 2018-01-15 RX ORDER — DIPHENHYDRAMINE HYDROCHLORIDE 50 MG/ML
50 INJECTION, SOLUTION INTRAMUSCULAR; INTRAVENOUS ONCE
Status: DISCONTINUED | OUTPATIENT
Start: 2018-01-15 | End: 2018-01-15 | Stop reason: HOSPADM

## 2018-01-15 RX ORDER — SODIUM CHLORIDE 9 MG/ML
100 INJECTION, SOLUTION INTRAVENOUS CONTINUOUS
Status: DISCONTINUED | OUTPATIENT
Start: 2018-01-15 | End: 2018-01-15 | Stop reason: HOSPADM

## 2018-01-15 RX ORDER — SODIUM CHLORIDE 0.9 % (FLUSH) 0.9 %
5-10 SYRINGE (ML) INJECTION EVERY 8 HOURS
Status: DISCONTINUED | OUTPATIENT
Start: 2018-01-15 | End: 2018-01-15 | Stop reason: HOSPADM

## 2018-01-15 RX ORDER — NALOXONE HYDROCHLORIDE 0.4 MG/ML
0.4 INJECTION, SOLUTION INTRAMUSCULAR; INTRAVENOUS; SUBCUTANEOUS
Status: DISCONTINUED | OUTPATIENT
Start: 2018-01-15 | End: 2018-01-15 | Stop reason: HOSPADM

## 2018-01-15 RX ORDER — FLUMAZENIL 0.1 MG/ML
0.2 INJECTION INTRAVENOUS
Status: DISCONTINUED | OUTPATIENT
Start: 2018-01-15 | End: 2018-01-15 | Stop reason: HOSPADM

## 2018-01-15 RX ORDER — EPINEPHRINE 0.1 MG/ML
1 INJECTION INTRACARDIAC; INTRAVENOUS
Status: DISCONTINUED | OUTPATIENT
Start: 2018-01-15 | End: 2018-01-15 | Stop reason: HOSPADM

## 2018-01-15 RX ORDER — PROPOFOL 10 MG/ML
INJECTION, EMULSION INTRAVENOUS AS NEEDED
Status: DISCONTINUED | OUTPATIENT
Start: 2018-01-15 | End: 2018-01-15 | Stop reason: HOSPADM

## 2018-01-15 RX ORDER — ATROPINE SULFATE 0.1 MG/ML
0.5 INJECTION INTRAVENOUS
Status: DISCONTINUED | OUTPATIENT
Start: 2018-01-15 | End: 2018-01-15 | Stop reason: HOSPADM

## 2018-01-15 RX ORDER — SODIUM CHLORIDE 0.9 % (FLUSH) 0.9 %
5-10 SYRINGE (ML) INJECTION AS NEEDED
Status: DISCONTINUED | OUTPATIENT
Start: 2018-01-15 | End: 2018-01-15 | Stop reason: HOSPADM

## 2018-01-15 RX ORDER — MIDAZOLAM HYDROCHLORIDE 1 MG/ML
.25-1 INJECTION, SOLUTION INTRAMUSCULAR; INTRAVENOUS
Status: DISCONTINUED | OUTPATIENT
Start: 2018-01-15 | End: 2018-01-15 | Stop reason: HOSPADM

## 2018-01-15 RX ORDER — FENTANYL CITRATE 50 UG/ML
100 INJECTION, SOLUTION INTRAMUSCULAR; INTRAVENOUS
Status: DISCONTINUED | OUTPATIENT
Start: 2018-01-15 | End: 2018-01-15 | Stop reason: HOSPADM

## 2018-01-15 RX ORDER — DEXTROMETHORPHAN/PSEUDOEPHED 2.5-7.5/.8
1.2 DROPS ORAL
Status: DISCONTINUED | OUTPATIENT
Start: 2018-01-15 | End: 2018-01-15 | Stop reason: HOSPADM

## 2018-01-15 RX ORDER — SODIUM CHLORIDE 9 MG/ML
INJECTION, SOLUTION INTRAVENOUS
Status: DISCONTINUED | OUTPATIENT
Start: 2018-01-15 | End: 2018-01-15 | Stop reason: HOSPADM

## 2018-01-15 RX ADMIN — PROPOFOL 25 MG: 10 INJECTION, EMULSION INTRAVENOUS at 07:44

## 2018-01-15 RX ADMIN — PROPOFOL 50 MG: 10 INJECTION, EMULSION INTRAVENOUS at 07:41

## 2018-01-15 RX ADMIN — PROPOFOL 25 MG: 10 INJECTION, EMULSION INTRAVENOUS at 07:39

## 2018-01-15 RX ADMIN — PROPOFOL 25 MG: 10 INJECTION, EMULSION INTRAVENOUS at 07:50

## 2018-01-15 RX ADMIN — PROPOFOL 25 MG: 10 INJECTION, EMULSION INTRAVENOUS at 07:40

## 2018-01-15 RX ADMIN — SODIUM CHLORIDE: 9 INJECTION, SOLUTION INTRAVENOUS at 07:36

## 2018-01-15 RX ADMIN — PROPOFOL 75 MG: 10 INJECTION, EMULSION INTRAVENOUS at 07:37

## 2018-01-15 RX ADMIN — PROPOFOL 25 MG: 10 INJECTION, EMULSION INTRAVENOUS at 07:38

## 2018-01-15 RX ADMIN — PROPOFOL 25 MG: 10 INJECTION, EMULSION INTRAVENOUS at 07:47

## 2018-01-15 NOTE — ANESTHESIA POSTPROCEDURE EVALUATION
Post-Anesthesia Evaluation and Assessment    Patient: Babak Zaragoza MRN: [id number]  SSN: [id number]    YOB: 1960  Age: 62 y.o. Sex: female       Cardiovascular Function/Vital Signs  Visit Vitals    /49    Pulse (!) 55    Temp 36.6 °C (97.8 °F)    Resp 14    Ht 5' 2\" (1.575 m)    Wt 90.7 kg (200 lb)    SpO2 97%    Breastfeeding No    BMI 36.58 kg/m2       Patient is status post MAC anesthesia for Procedure(s):  COLONOSCOPY. Nausea/Vomiting: None    Postoperative hydration reviewed and adequate. Pain:  Pain Scale 1: Numeric (0 - 10) (01/15/18 0818)  Pain Intensity 1: 0 (01/15/18 0818)   Managed    Neurological Status: At baseline    Mental Status and Level of Consciousness: Arousable    Pulmonary Status:   O2 Device: Room air (01/15/18 0818)   Adequate oxygenation and airway patent    Complications related to anesthesia: None    Post-anesthesia assessment completed.  No concerns    Signed By: Bryce Steven MD     January 15, 2018

## 2018-01-15 NOTE — PROCEDURES
[address]        Colonoscopy Operative Report    Lu Robert  [id number]  1960      Procedure Type:   Colonoscopy --diagnostic     Indications:    Occult blood in stool         Pre-operative Diagnosis: see indication above    Post-operative Diagnosis:  See findings below    :  Jason Jimenez. Harman Bassett MD      Referring Provider: Tad Combs MD      Sedation:  MAC anesthesia Propofol      Procedure Details:  After informed consent was obtained with all risks and benefits of procedure explained and preoperative exam completed, the patient was taken to the endoscopy suite and placed in the left lateral decubitus position. Upon sequential sedation as per above, a digital rectal exam was performed demonstrating internal hemorrhoids. The Olympus pediatric videocolonoscope  was inserted in the rectum and carefully advanced to the terminal ileum. The cecum was identified by the ileocecal valve and appendiceal orifice. The quality of preparation was good. The colonoscope was slowly withdrawn with careful evaluation between folds. Retroflexion in the rectum was completed . Findings:   Rectum: small internal hemorrhoids  Sigmoid: normal  Descending Colon: normal  Transverse Colon: normal  Ascending Colon: normal  Cecum: normal  Terminal Ileum: normal      Specimen Removed:  none    Complications: None. EBL:  None. Impression:    normal colonic mucosa throughout  hemorrhoids internal, Small in size    Recommendations:    - For colon cancer screening in this average-risk patient, colonoscopy may be repeated in 10 years. - High fiber diet. Signed By: Jason Jimenez.  Harman Bassett MD     1/15/2018  8:00 AM

## 2018-01-15 NOTE — PROGRESS NOTES

## 2018-01-15 NOTE — ANESTHESIA PREPROCEDURE EVALUATION
Anesthetic History   No history of anesthetic complications            Review of Systems / Medical History  Patient summary reviewed, nursing notes reviewed and pertinent labs reviewed    Pulmonary                Comments: RAD   Neuro/Psych   Within defined limits           Cardiovascular  Within defined limits                     GI/Hepatic/Renal  Within defined limits              Endo/Other        Arthritis    Comments: RA Other Findings              Physical Exam    Airway  Mallampati: II  TM Distance: > 6 cm  Neck ROM: normal range of motion   Mouth opening: Normal     Cardiovascular  Regular rate and rhythm,  S1 and S2 normal,  no murmur, click, rub, or gallop             Dental  No notable dental hx       Pulmonary  Breath sounds clear to auscultation               Abdominal  GI exam deferred       Other Findings            Anesthetic Plan    ASA: 2  Anesthesia type: MAC          Induction: Intravenous  Anesthetic plan and risks discussed with: Patient

## 2018-01-15 NOTE — IP AVS SNAPSHOT
2700 [facility] [address] [place] [address] 
[phone] Patient: Stevo Peres MRN: [id number] DYR:[id number]/[id number] About your hospitalization You were admitted on:  January 15, 2018 You last received care in the:  [facility] ENDOSCOPY You were discharged on:  January 15, 2018 Why you were hospitalized Your primary diagnosis was:  Not on File Follow-up Information None Your Scheduled Appointments Thursday January 25, 2018  9:45 AM EST  
ROUTINE CARE with Mariluz Crespo MD  
[facility] ([facility]) [address] [place] [address]  
[phone] Discharge Orders None A check jim indicates which time of day the medication should be taken. My Medications CONTINUE taking these medications Instructions Each Dose to Equal  
 Morning Noon Evening Bedtime ADVIL -38 mg Tab Generic drug:  Ibuprofen-diphenhydrAMINE Your last dose was: Your next dose is: Take 200 mg by mouth as needed. 200 mg  
    
   
   
   
  
 cyanocobalamin 1,000 mcg tablet Your last dose was: Your next dose is: Take 1,000 mcg by mouth as needed. 1000 mcg  
    
   
   
   
  
 ergocalciferol 50,000 unit capsule Commonly known as:  ERGOCALCIFEROL Your last dose was: Your next dose is: Take 1 Cap by mouth every seven (7) days. [address] Units  
    
   
   
   
  
 ferrous sulfate 325 mg (65 mg iron) tablet Your last dose was: Your next dose is: Take 325 mg by mouth as needed. 325 mg  
    
   
   
   
  
 folic acid 1 mg tablet Commonly known as:  [employer] Your last dose was: Your next dose is: Take 1 mg by mouth daily. Indications: does not take on Mondays 1 mg  
    
   
   
   
  
 methotrexate 2.5 mg tablet Commonly known as:  Laura López Your last dose was: Your next dose is: Take 8 Tabs by mouth every Monday for 90 days. 20 mg Senna 8.6 mg Cap Generic drug:  sennosides Your last dose was: Your next dose is: Take 8.6 mg by mouth as needed. 8.6 mg  
    
   
   
   
  
 SINGULAIR 10 mg tablet Generic drug:  montelukast  
   
Your last dose was: Your next dose is: Take 10 mg by mouth as needed. 10 mg  
    
   
   
   
  
 tofacitinib 11 mg Tb24 Commonly known as:  XELJANZ XR Your last dose was: Your next dose is: Take 11 mg by mouth daily for 30 days. 11 mg Discharge Instructions 295 [facility] 
611 [facility], [address] [place] [address] COLON DISCHARGE INSTRUCTIONS 243 [facility] [phone] 
1960 Discomfort: 
Redness at IV site- apply warm compress to area; if redness or soreness persist- contact your physician There may be a slight amount of blood passed from the rectum Gaseous discomfort- walking, belching will help relieve any discomfort You may not operate a vehicle for 12 hours You may not engage in an occupation involving machinery or appliances for rest of today You may not drink alcoholic beverages for at least 12 hours Avoid making any critical decisions for at least 24 hour DIET: 
You may resume your regular diet  however -  remember your colon is empty and a heavy meal will produce gas. Avoid these foods:  vegetables, fried / greasy foods, carbonated drinks ACTIVITY: 
You may  resume your normal daily activities it is recommended that you spend the remainder of the day resting -  avoid any strenuous activity. CALL M.D. ANY SIGN OF: Increasing pain, nausea, vomiting Abdominal distension (swelling) New increased bleeding (oral or rectal) Fever (chills) Pain in chest area Bloody discharge from nose or mouth Shortness of breath Follow-up Instructions: 
 Call Dr. Pineda Manzanares for any questions or problems at [phone] ENDOSCOPY FINDINGS: 
 Your colonoscopy showed small internal hemorrhoids. Please maintain a high fiber diet. Your next colonoscopy will be due in 10 years. Telephone # [phone] Signed By: William Salamanca MD   
 1/15/2018  7:59 AM 
  
 
DISCHARGE SUMMARY from Nurse The following personal items collected during your admission are returned to you:  
Dental Appliance: Dental Appliances: None Vision: Visual Aid: None Hearing Aid:   
Jewelry:   
Clothing:   
Other Valuables:   
Valuables sent to safe:   
 
 
 
  
  
  
Introducing [facility]! Dear Cydney Jaramillo: 
Thank you for requesting a [employer] account. Our records indicate that you already have an active [employer] account. You can access your account anytime at [email or web address]. [email or web address] Did you know that you can access your hospital and ER discharge instructions at any time in [employer]? You can also review all of your test results from your hospital stay or ER visit. Additional Information If you have questions, please visit the Frequently Asked Questions section of the [employer] website at [email or web address] Remember, [employer] is NOT to be used for urgent needs. For medical emergencies, dial 911. Now available from your iPhone and Android! Providers Seen During Your Hospitalization Provider Specialty Primary office phone Michael Salamanca MD Gastroenterology [phone] Your Primary Care Physician (PCP) Primary Care Physician Office Phone Office Fax Jada Gee [phone] You are allergic to the following No active allergies Recent Documentation Height Weight Breastfeeding? BMI OB Status Smoking Status 1.575 m 90.7 kg No 36.58 kg/m2 Menopause Former Smoker Emergency Contacts Name Discharge Info Relation Home Work Mobile Nivia Coffman DISCHARGE CAREGIVER [3] Other Relative [6] [phone] Patient Belongings The following personal items are in your possession at time of discharge: 
  Dental Appliances: None  Visual Aid: None Please provide this summary of care documentation to your next provider. Signatures-by signing, you are acknowledging that this After Visit Summary has been reviewed with you and you have received a copy. Patient Signature:  ____________________________________________________________ Date:  ____________________________________________________________  
  
[facility] Officer Provider Signature:  ____________________________________________________________ Date:  ____________________________________________________________

## 2018-01-15 NOTE — DISCHARGE INSTRUCTIONS
908 [facility]    COLON DISCHARGE INSTRUCTIONS    Dilcia Magaña  [id number]  1960    Discomfort:  Redness at IV site- apply warm compress to area; if redness or soreness persist- contact your physician  There may be a slight amount of blood passed from the rectum  Gaseous discomfort- walking, belching will help relieve any discomfort  You may not operate a vehicle for 12 hours  You may not engage in an occupation involving machinery or appliances for rest of today  You may not drink alcoholic beverages for at least 12 hours  Avoid making any critical decisions for at least 24 hour  DIET:  You may resume your regular diet - however -  remember your colon is empty and a heavy meal will produce gas. Avoid these foods:  vegetables, fried / greasy foods, carbonated drinks     ACTIVITY:  You may  resume your normal daily activities it is recommended that you spend the remainder of the day resting -  avoid any strenuous activity. CALL M.D. ANY SIGN OF:   Increasing pain, nausea, vomiting  Abdominal distension (swelling)  New increased bleeding (oral or rectal)  Fever (chills)  Pain in chest area  Bloody discharge from nose or mouth  Shortness of breath      Follow-up Instructions:   Call Dr. Nelly Tinajero for any questions or problems at [phone]          ENDOSCOPY FINDINGS:   Your colonoscopy showed small internal hemorrhoids. Please maintain a high fiber diet. Your next colonoscopy will be due in 10 years. Telephone # [phone]      Signed By: Portia Cotton.  Eboni Nunez MD     1/15/2018  7:59 AM       DISCHARGE SUMMARY from Nurse    The following personal items collected during your admission are returned to you:   Dental Appliance: Dental Appliances: None  Vision: Visual Aid: None  Hearing Aid:    Jewelry:    Clothing:    Other Valuables:    Valuables sent to safe:

## 2018-01-15 NOTE — H&P
[address], [address] [place] [address]      History and Physical       NAME:  Aleyda Calero   :   1960   MRN:   [id number]             History of Present Illness:  Patient is a 62 y.o. who is seen for occult positive stool. No prior colonoscopy. PMH:  Past Medical History:   Diagnosis Date    Anemia     Reactive airway disease     Rheumatoid arthritis ([address] 10/24/2017       PSH:  Past Surgical History:   Procedure Laterality Date    HX OTHER SURGICAL      pins placed in left wrist        Allergies:  No Known Allergies    Home Medications:  Prior to Admission Medications   Prescriptions Last Dose Informant Patient Reported? Taking? Ibuprofen-diphenhydrAMINE (ADVIL PM) 200-38 mg tab 2018 at Unknown time  Yes Yes   Sig: Take 200 mg by mouth as needed. cyanocobalamin 1,000 mcg tablet 2018 at Unknown time  Yes Yes   Sig: Take 1,000 mcg by mouth as needed. ergocalciferol (ERGOCALCIFEROL) 50,000 unit capsule 2018 at Unknown time  No Yes   Sig: Take 1 Cap by mouth every seven (7) days. ferrous sulfate 325 mg (65 mg iron) tablet 2018 at Unknown time  Yes Yes   Sig: Take 325 mg by mouth as needed. folic acid (FOLVITE) 1 mg tablet 2018 at Unknown time  Yes Yes   Sig: Take 1 mg by mouth daily. Indications: does not take on    methotrexate (RHEUMATREX) 2.5 mg tablet 2018 at Unknown time  No Yes   Sig: Take 8 Tabs by mouth every Monday for 90 days. montelukast (SINGULAIR) 10 mg tablet 2018 at Unknown time  Yes Yes   Sig: Take 10 mg by mouth as needed. sennosides (SENNA) 8.6 mg cap 2018 at Unknown time  Yes Yes   Sig: Take 8.6 mg by mouth as needed. tofacitinib (XELJANZ XR) 11 mg Tb24 Not Taking at Unknown time  No No   Sig: Take 11 mg by mouth daily for 30 days.       Facility-Administered Medications: None       Hospital Medications:  Current Facility-Administered Medications   Medication Dose Route Frequency    0.9% sodium chloride infusion  100 mL/hr IntraVENous CONTINUOUS    sodium chloride (NS) flush 5-10 mL  5-10 mL IntraVENous Q8H    sodium chloride (NS) flush 5-10 mL  5-10 mL IntraVENous PRN    midazolam (VERSED) injection 0.25-10 mg  0.25-10 mg IntraVENous Multiple    fentaNYL citrate (PF) injection 100 mcg  100 mcg IntraVENous Multiple    naloxone (NARCAN) injection 0.4 mg  0.4 mg IntraVENous Multiple    flumazenil (ROMAZICON) 0.1 mg/mL injection 0.2 mg  0.2 mg IntraVENous Multiple    simethicone (MYLICON) 86FV/5.8KE oral drops 80 mg  1.2 mL Oral Multiple    diphenhydrAMINE (BENADRYL) injection 50 mg  50 mg IntraVENous ONCE    atropine injection 0.5 mg  0.5 mg IntraVENous ONCE PRN    EPINEPHrine (ADRENALIN) 0.1 mg/mL syringe 1 mg  1 mg Endoscopically ONCE PRN       Social History:  Social History   Substance Use Topics    Smoking status: Former Smoker     Quit date: 2012    Smokeless tobacco: Never Used    Alcohol use No       Family History:  Family History   Problem Relation Age of Onset    Thyroid Disease Mother     High Cholesterol Mother     Heart Disease Father              Review of Systems:      Constitutional: negative fever, negative chills, negative weight loss  Eyes:   negative visual changes  ENT:   negative sore throat, tongue or lip swelling  Respiratory:  negative cough, negative dyspnea  Cards:  negative for chest pain, palpitations, lower extremity edema  GI:   See HPI  :  negative for frequency, dysuria  Integument:  negative for rash and pruritus  Heme:  negative for easy bruising and gum/nose bleeding  Musculoskel: negative for myalgias,  back pain and muscle weakness  Neuro: negative for headaches, dizziness, vertigo  Psych:  negative for feelings of anxiety, depression       Objective:   Patient Vitals for the past 8 hrs:   BP Temp Pulse Resp SpO2 Height Weight   01/15/18 0728 119/77 97.5 °F (36.4 °C) 67 18 100 % 5' 2\" (1.575 m) 90.7 kg (200 lb)             EXAM: NEURO-a&o   HEENT-wnl   LUNGS-clear    COR-regular rate and rhythym     ABD-soft , no tenderness, no rebound, good bs     EXT-no edema     Data Review     No results for input(s): WBC, HGB, HCT, PLT, HGBEXT, HCTEXT, PLTEXT in the last 72 hours. No results for input(s): NA, K, CL, CO2, BUN, CREA, GLU, PHOS, CA in the last 72 hours. No results for input(s): SGOT, GPT, AP, TBIL, TP, ALB, GLOB, GGT, AML, LPSE in the last 72 hours. No lab exists for component: AMYP, HLPSE  No results for input(s): INR, PTP, APTT in the last 72 hours. No lab exists for component: INREXT       Assessment:   · Occult positive stool     Patient Active Problem List   Diagnosis Code    Rheumatoid arthritis ([facility] M06.9    Anemia D64.9    Long-term use of immunosuppressant medication Z79.899    Primary osteoarthritis of both knees M17.0     Plan:   · Endoscopic procedure with MAC     Signed By: Leatha Lee.  Taina Waldrop MD     1/15/2018  7:34 AM

## 2018-01-15 NOTE — ROUTINE PROCESS
Gildardo Roe  1960  [id number]    Situation:  Verbal report received from: [facility]  Procedure: Procedure(s):  COLONOSCOPY    Background:    Preoperative diagnosis: BLOOD IN STOOL  Postoperative diagnosis: 1.- Internal Hemorrhoids    :  Dr. Oscar Silva  Assistant(s): Endoscopy Technician-1: Sukhjinder Marc  Endoscopy RN-1: Jelly Molina RN    Specimens: * No specimens in log *  H. Pylori  no    Assessment:  Intra-procedure medications     Anesthesia gave intra-procedure sedation and medications, see anesthesia flow sheet yes    Intravenous fluids: NS@ KVO     Vital signs stable     Abdominal assessment: round and soft     Recommendation:  Discharge patient per MD order.   Return to floor  Family or Friend   Permission to share finding with family or friend yes

## 2018-01-18 DIAGNOSIS — M06.9 RHEUMATOID ARTHRITIS WITH UNKNOWN RHEUMATOID FACTOR STATUS (HCC): ICD-10-CM

## 2018-01-19 ENCOUNTER — TELEPHONE (OUTPATIENT)
Dept: RHEUMATOLOGY | Age: 58
End: 2018-01-19

## 2018-01-25 ENCOUNTER — HOSPITAL ENCOUNTER (OUTPATIENT)
Dept: LAB | Age: 58
Discharge: HOME | End: 2018-01-25
Payer: MEDICARE

## 2018-01-25 ENCOUNTER — OFFICE VISIT (OUTPATIENT)
Dept: FAMILY MEDICINE CLINIC | Age: 58
End: 2018-01-25

## 2018-01-25 VITALS
OXYGEN SATURATION: 98 % | DIASTOLIC BLOOD PRESSURE: 82 MMHG | BODY MASS INDEX: 38.13 KG/M2 | WEIGHT: 207.2 LBS | HEART RATE: 66 BPM | SYSTOLIC BLOOD PRESSURE: 124 MMHG | RESPIRATION RATE: 18 BRPM | HEIGHT: 62 IN | TEMPERATURE: 98.1 F

## 2018-01-25 DIAGNOSIS — Z12.4 CERVICAL CANCER SCREENING: Primary | ICD-10-CM

## 2018-01-25 DIAGNOSIS — E78.5 HYPERLIPIDEMIA, UNSPECIFIED HYPERLIPIDEMIA TYPE: ICD-10-CM

## 2018-01-25 DIAGNOSIS — Z13.29 SCREENING FOR THYROID DISORDER: ICD-10-CM

## 2018-01-25 PROCEDURE — 87624 HPV HI-RISK TYP POOLED RSLT: CPT | Performed by: FAMILY MEDICINE

## 2018-01-25 PROCEDURE — 88175 CYTOPATH C/V AUTO FLUID REDO: CPT | Performed by: FAMILY MEDICINE

## 2018-01-25 NOTE — PROGRESS NOTES
Patient Name: Andrew Mak   MRN: [id number]    Ramana Ireland is a 62 y.o. female who presents with the following:     Here today for routine Pap smear. Believes that her last one was done about 7 years ago. No history of abnormal Pap smears. Patient reports that she has been struggling with losing weight ever since she was diagnosed with rheumatoid arthritis. Believes that prior episodes of being on prednisone has made it difficult for her to lose weight. States that she eats fairly healthy and enjoys exercising but is unable to do weight lifting due to her arthritis. Does not believe she has had her thyroid checked in the past.  History of hyperlipidemia, not currently on medications. Review of Systems   Constitutional: Negative for fever, malaise/fatigue and weight loss. Respiratory: Negative for cough, hemoptysis, shortness of breath and wheezing. Cardiovascular: Negative for chest pain, palpitations, leg swelling and PND. Gastrointestinal: Negative for abdominal pain, constipation, diarrhea, nausea and vomiting. The patient's medications, allergies, past medical history, surgical history, family history and social history were reviewed and updated where appropriate. Prior to Admission medications    Medication Sig Start Date End Date Taking? Authorizing Provider   ergocalciferol (ERGOCALCIFEROL) 50,000 unit capsule Take 1 Cap by mouth every seven (7) days. 1/2/18 1/2/19 Yes Lacy Newell MD   methotrexate (RHEUMATREX) 2.5 mg tablet Take 8 Tabs by mouth every Monday for 90 days. 1/1/18 4/1/18 Yes Lacy Newell MD   folic acid (FOLVITE) 1 mg tablet Take 1 mg by mouth daily. Indications: does not take on Mondays   Yes Historical Provider   montelukast (SINGULAIR) 10 mg tablet Take 10 mg by mouth as needed. Yes Historical Provider   Ibuprofen-diphenhydrAMINE (ADVIL PM) 200-38 mg tab Take 200 mg by mouth as needed.    Yes Historical Provider cyanocobalamin 1,000 mcg tablet Take 1,000 mcg by mouth as needed. Yes Historical Provider   sennosides (SENNA) 8.6 mg cap Take 8.6 mg by mouth as needed. Yes Historical Provider   tofacitinib (XELJANZ XR) 11 mg Tb24 Take 11 mg by mouth daily for 30 days. 1/22/18 2/21/18  Luan Khan MD   ferrous sulfate 325 mg (65 mg iron) tablet Take 325 mg by mouth as needed. Historical Provider       No Known Allergies        OBJECTIVE    Visit Vitals    /82 (BP 1 Location: Left arm, BP Patient Position: Sitting)    Pulse 66    Temp 98.1 °F (36.7 °C) (Oral)    Resp 18    Ht 5' 2\" (1.575 m)    Wt 207 lb 3.2 oz (94 kg)    SpO2 98%    BMI 37.9 kg/m2       Physical Exam   Constitutional: She is oriented to person, place, and time and well-developed, well-nourished, and in no distress. No distress. HENT:   Head: Normocephalic and atraumatic. Right Ear: External ear normal.   Left Ear: External ear normal.   Eyes: Conjunctivae and EOM are normal. Pupils are equal, round, and reactive to light. Neurological: She is alert and oriented to person, place, and time. Gait normal.   Skin: She is not diaphoretic. Psychiatric: Mood, memory, affect and judgment normal.   Nursing note and vitals reviewed. Pelvic exam: VULVA: normal appearing vulva with no masses, tenderness or lesions, VAGINA: normal appearing vagina with normal color and discharge, no lesions, CERVIX: normal appearing cervix without discharge or lesions, PAP: Pap smear done today, HPV test.      ASSESSMENT AND PLAN  Leopold Saha is a 62 y.o. female who presents today for:    1. Cervical cancer screening  - PAP IG, APTIMA HPV AND RFX 16/18,45 ([id number]); Future  - PAP IG, APTIMA HPV AND RFX 16/18,45 ([id number])    2. Hyperlipidemia, unspecified hyperlipidemia type  Will calculate ASCVD risk score pending labs. - LIPID PANEL    3. BMI 37.0-37.9, adult  Will r/o thyroid d/o. Likely due to menopausal vs RA.   Pt will try to encourage water aerobics. I have reviewed/discussed the above normal BMI with the patient. I have recommended the following interventions: dietary management education, guidance, and counseling, encourage exercise, monitor weight and prescribed dietary intake.   - TSH AND FREE T4       There are no discontinued medications. Follow-up Disposition:  Return in about 6 months (around 7/25/2018) for Medication Check. Medication risks/benefits/costs/interactions/alternatives discussed with patient. Advised patient to call back or return to office if symptoms worsen/change/persist. If patient cannot reach us or should anything more severe/urgent arise he/she should proceed directly to the nearest emergency department. Discussed expected course/resolution/complications of diagnosis in detail with patient. Patient given a written after visit summary which includes his/her diagnoses, current medications and vitals. Patient expressed understanding with the diagnosis and plan.      Marta Slaughter M.D.

## 2018-01-25 NOTE — PROGRESS NOTES
Chief Complaint   Patient presents with    Cholesterol Problem     follow up    Gyn Exam    Weight Gain     1. Have you been to the ER, urgent care clinic since your last visit? Hospitalized since your last visit? No    2. Have you seen or consulted any other health care providers outside of the [address] [place] [address] since your last visit? Include any pap smears or colon screening. No   Patient stated she had a mammogram done 12/2017.

## 2018-01-25 NOTE — MR AVS SNAPSHOT
[address] [facility] 
 
 
 [address] 
[phone] Patient: Aleyda Calero MRN: [id number] BUT:5/2/5388 Visit Information Date & Time Provider Department Dept. Phone Encounter #  
 1/25/2018  9:45 AM Yumi Bernal  [facility] [phone] [id number] Follow-up Instructions Return in about 6 months (around 7/25/2018) for Medication Check. Your Appointments 3/27/2018 10:20 AM  
ESTABLISHED PATIENT with Cait Guzman MD  
[address] ([facility]) Appt Note: 3 month f/up  
 [address] [place] [address]  
[phone]  
  
   
 [address] [address] [facility] [phone] Upcoming Health Maintenance Date Due DTaP/Tdap/Td series (1 - Tdap) 9/3/1981 PAP AKA CERVICAL CYTOLOGY 9/3/1981 FOBT Q 1 YEAR AGE 50-75 11/2/2018 BREAST CANCER SCRN MAMMOGRAM 12/12/2019 Allergies as of 1/25/2018  Review Complete On: 1/25/2018 By: Moriah Hdz No Known Allergies Current Immunizations  Reviewed on 1/8/2018 Name Date  
 TB Skin Test (PPD) Intradermal 1/8/2018 Not reviewed this visit You Were Diagnosed With   
  
 Codes Comments Cervical cancer screening    -  Primary ICD-10-CM: Z12.4 ICD-9-CM: V76.2 Hyperlipidemia, unspecified hyperlipidemia type     ICD-10-CM: E78.5 ICD-9-CM: 272.4 BMI 37.0-37.9, adult     ICD-10-CM: Z68.37 ICD-9-CM: V85.37 Vitals BP Pulse Temp Resp Height(growth percentile) Weight(growth percentile) 124/82 (BP 1 Location: Left arm, BP Patient Position: Sitting) 66 98.1 °F (36.7 °C) (Oral) 18 5' 2\" (1.575 m) 207 lb 3.2 oz (94 kg) SpO2 BMI OB Status Smoking Status 98% 37.9 kg/m2 Menopause Former Smoker Vitals History BMI and BSA Data Body Mass Index Body Surface Area  
 37.9 kg/m 2 2.03 m 2 Preferred Pharmacy Pharmacy Name Phone [facility], [address] [place] [address] [phone] Your Updated Medication List  
  
   
This list is accurate as of: 1/25/18 10:45 AM.  Always use your most recent med list. ADVIL -38 mg Tab Generic drug:  Ibuprofen-diphenhydrAMINE Take 200 mg by mouth as needed. cyanocobalamin 1,000 mcg tablet Take 1,000 mcg by mouth as needed. ergocalciferol 50,000 unit capsule Commonly known as:  ERGOCALCIFEROL Take 1 Cap by mouth every seven (7) days. ferrous sulfate 325 mg (65 mg iron) tablet Take 325 mg by mouth as needed. folic acid 1 mg tablet Commonly known as:  Google Take 1 mg by mouth daily. Indications: does not take on Mondays  
  
 methotrexate 2.5 mg tablet Commonly known as:  Mavis Castro Take 8 Tabs by mouth every Monday for 90 days. Senna 8.6 mg Cap Generic drug:  sennosides Take 8.6 mg by mouth as needed. SINGULAIR 10 mg tablet Generic drug:  montelukast  
Take 10 mg by mouth as needed. tofacitinib 11 mg Tb24 Commonly known as:  XELJANZ XR Take 11 mg by mouth daily for 30 days. We Performed the Following LIPID PANEL [19582 CPT(R)] TSH AND FREE T4 [90427 CPT(R)] Follow-up Instructions Return in about 6 months (around 7/25/2018) for Medication Check. To-Do List   
 01/25/2018 Pathology:  PAP IG, APTIMA HPV AND RFX [birth year] ([id number]) Patient Instructions Pap Test: Care Instructions Your Care Instructions The Pap test (also called a Pap smear) is a screening test for cancer of the cervix, which is the lower part of the uterus that opens into the vagina. The test can help your doctor find early changes in the cells that could lead to cancer. The sample of cells taken during your test has been sent to a lab so that an expert can look at the cells.  It usually takes a week or two to get the results back. Follow-up care is a key part of your treatment and safety. Be sure to make and go to all appointments, and call your doctor if you are having problems. It's also a good idea to know your test results and keep a list of the medicines you take. What do the results mean? · A normal result means that the test did not find any abnormal cells in the sample. · An abnormal result can mean many things. Most of these are not cancer. The results of your test may be abnormal because: 
¨ You have an infection of the vagina or cervix, such as a yeast infection. ¨ You have an IUD (intrauterine device for birth control). ¨ You have low estrogen levels after menopause that are causing the cells to change. ¨ You have cell changes that may be a sign of precancer or cancer. The results are ranked based on how serious the changes might be. There are many other reasons why you might not get a normal result. If the results were abnormal, you may need to get another test within a few weeks or months. If the results show changes that could be a sign of cancer, you may need a test called a colposcopy, which provides a more complete view of the cervix. Sometimes the lab cannot use the sample because it does not contain enough cells or was not preserved well. If so, you may need to have the test again. This is not common, but it does happen from time to time. When should you call for help? Watch closely for changes in your health, and be sure to contact your doctor if: 
? · You have vaginal bleeding or pain for more than 2 days after the test. It is normal to have a small amount of bleeding for a day or two after the test.  
Where can you learn more? Go to http://saw-fifi.info/. Enter N666 in the search box to learn more about \"Pap Test: Care Instructions. \" Current as of: May 12, 2017 Content Version: 11.4 © 3401-1456 Healthwise, [employer].  Care instructions adapted under license by [address] (which disclaims liability or warranty for this information). If you have questions about a medical condition or this instruction, always ask your healthcare professional. [address] any warranty or liability for your use of this information. Introducing [facility] [place] [facility]! Dear Dwight Mc: 
Thank you for requesting a "[employer] account. Our records indicate that you already have an active "[employer] account. You can access your account anytime at [email or web address] Did you know that you can access your hospital and ER discharge instructions at any time in "[employer]? You can also review all of your test results from your hospital stay or ER visit. Additional Information If you have questions, please visit the Frequently Asked Questions section of the "[employer] website at [email or web address] Remember, "[employer] is NOT to be used for urgent needs. For medical emergencies, dial 911. Now available from your iPhone and Android! Please provide this summary of care documentation to your next provider. Your primary care clinician is listed as Dex Denny. If you have any questions after today's visit, please call [phone].

## 2018-01-25 NOTE — PATIENT INSTRUCTIONS
Pap Test: Care Instructions  Your Care Instructions    The Pap test (also called a Pap smear) is a screening test for cancer of the cervix, which is the lower part of the uterus that opens into the vagina. The test can help your doctor find early changes in the cells that could lead to cancer. The sample of cells taken during your test has been sent to a lab so that an expert can look at the cells. It usually takes a week or two to get the results back. Follow-up care is a key part of your treatment and safety. Be sure to make and go to all appointments, and call your doctor if you are having problems. It's also a good idea to know your test results and keep a list of the medicines you take. What do the results mean? · A normal result means that the test did not find any abnormal cells in the sample. · An abnormal result can mean many things. Most of these are not cancer. The results of your test may be abnormal because:  ¨ You have an infection of the vagina or cervix, such as a yeast infection. ¨ You have an IUD (intrauterine device for birth control). ¨ You have low estrogen levels after menopause that are causing the cells to change. ¨ You have cell changes that may be a sign of precancer or cancer. The results are ranked based on how serious the changes might be. There are many other reasons why you might not get a normal result. If the results were abnormal, you may need to get another test within a few weeks or months. If the results show changes that could be a sign of cancer, you may need a test called a colposcopy, which provides a more complete view of the cervix. Sometimes the lab cannot use the sample because it does not contain enough cells or was not preserved well. If so, you may need to have the test again. This is not common, but it does happen from time to time. When should you call for help?   Watch closely for changes in your health, and be sure to contact your doctor if:  ? · You have vaginal bleeding or pain for more than 2 days after the test. It is normal to have a small amount of bleeding for a day or two after the test.   Where can you learn more? Go to http://saw-fifi.info/. Enter S614 in the search box to learn more about \"Pap Test: Care Instructions. \"  Current as of: May 12, 2017  Content Version: 11.4  © 3247-8837 [employer]. Care instructions adapted under license by [employer] (which disclaims liability or warranty for this information). If you have questions about a medical condition or this instruction, always ask your healthcare professional. [address] any warranty or liability for your use of this information.

## 2018-01-31 LAB
CHOLEST SERPL-MCNC: 226 MG/DL (ref 100–199)
HDLC SERPL-MCNC: 53 MG/DL
INTERPRETATION, 910389: NORMAL
LDLC SERPL CALC-MCNC: 148 MG/DL (ref 0–99)
T4 FREE SERPL-MCNC: 1.02 NG/DL (ref 0.82–1.77)
TRIGL SERPL-MCNC: 127 MG/DL (ref 0–149)
TSH SERPL DL<=0.005 MIU/L-ACNC: 1.77 UIU/ML (ref 0.45–4.5)
VLDLC SERPL CALC-MCNC: 25 MG/DL (ref 5–40)

## 2018-02-01 NOTE — PROGRESS NOTES
Released to [address] Good news, your pap smear test was normal. Your next pap is due in 5 years. Your thyroid levels are normal.  Your cholesterol levels are high. I would encourage a healthy diet and regular exercise with the goal of maintaining a healthy weight before starting medications for this.

## 2018-02-08 ENCOUNTER — TELEPHONE (OUTPATIENT)
Dept: RHEUMATOLOGY | Age: 58
End: 2018-02-08

## 2018-02-09 NOTE — TELEPHONE ENCOUNTER
Spoke with [employer] Vania Packer she states prior Mack Davalos was cancelled by the office on 1/18/2018. Informed Caitlyn the prior Mack Davalos was not cancelled. Spoke with [facility], and she took the prescription for Charlyne Fear XR off hold, and the prior auth was faxed to [address] by our office today.

## 2018-02-09 NOTE — TELEPHONE ENCOUNTER
Faxed in [place] Patient Prescription/ Enrollment form for Dhiraj Armas to [facility] Patient Assistance program.

## 2018-02-19 ENCOUNTER — PATIENT MESSAGE (OUTPATIENT)
Dept: FAMILY MEDICINE CLINIC | Age: 58
End: 2018-02-19

## 2018-02-20 RX ORDER — CETIRIZINE HCL 10 MG
10 TABLET ORAL DAILY
Qty: 90 TAB | Refills: 1 | Status: SHIPPED | OUTPATIENT
Start: 2018-02-20 | End: 2018-08-18 | Stop reason: SDUPTHER

## 2018-02-21 LAB
MM INDURATION POC: 0 MM (ref 0–5)
PPD POC: NORMAL NEGATIVE

## 2018-02-28 DIAGNOSIS — M06.9 RHEUMATOID ARTHRITIS WITH UNKNOWN RHEUMATOID FACTOR STATUS (HCC): ICD-10-CM

## 2018-03-01 NOTE — TELEPHONE ENCOUNTER
From: Ivanna Roe  To: Neli Montanez MD  Sent: 2/28/2018 5:32 PM EST  Subject: Medication Renewal Request    Original authorizing provider: Neli Montanez MD    [facility] would like a refill of the following medications:  tofacitinib (XELJANZ XR) 11 mg Tb24 Neli Montanez MD]    Preferred pharmacy: [address]    Comment:

## 2018-03-02 ENCOUNTER — TELEPHONE (OUTPATIENT)
Dept: RHEUMATOLOGY | Age: 58
End: 2018-03-02

## 2018-03-02 NOTE — TELEPHONE ENCOUNTER
Faxed Appeal Letter to [address] to forward to insurance company to help with Appeal for denial of Xeljanz XR.

## 2018-03-27 ENCOUNTER — OFFICE VISIT (OUTPATIENT)
Dept: RHEUMATOLOGY | Age: 58
End: 2018-03-27

## 2018-03-27 VITALS
BODY MASS INDEX: 38.05 KG/M2 | HEART RATE: 76 BPM | OXYGEN SATURATION: 97 % | RESPIRATION RATE: 18 BRPM | SYSTOLIC BLOOD PRESSURE: 110 MMHG | HEIGHT: 62 IN | WEIGHT: 206.8 LBS | TEMPERATURE: 97.8 F | DIASTOLIC BLOOD PRESSURE: 73 MMHG

## 2018-03-27 DIAGNOSIS — Z79.60 LONG-TERM USE OF IMMUNOSUPPRESSANT MEDICATION: ICD-10-CM

## 2018-03-27 DIAGNOSIS — E55.9 VITAMIN D DEFICIENCY: ICD-10-CM

## 2018-03-27 DIAGNOSIS — M05.79 SEROPOSITIVE RHEUMATOID ARTHRITIS OF MULTIPLE SITES (HCC): Primary | ICD-10-CM

## 2018-03-27 DIAGNOSIS — R06.00 DYSPNEA, UNSPECIFIED TYPE: ICD-10-CM

## 2018-03-27 DIAGNOSIS — M17.0 PRIMARY OSTEOARTHRITIS OF BOTH KNEES: ICD-10-CM

## 2018-03-27 RX ORDER — METHOTREXATE 2.5 MG/1
20 TABLET ORAL
Qty: 96 TAB | Refills: 0 | Status: SHIPPED | OUTPATIENT
Start: 2018-04-02 | End: 2018-05-01 | Stop reason: SDUPTHER

## 2018-03-27 NOTE — PROGRESS NOTES
REASON FOR VISIT    This is a follow-up visit for Ms. Roe for Seropositive Rheumatoid Arthritis. Inflammatory arthritis phenotype includes:  Anti-CCP positive: yes (>250)  Rheumatoid factor positive: yes (51.6)  Erosive disease: no  Extra-articular manifestations include: none    Immunosuppression Screening (12/27/2017): Quantiferon TB: indeterminate  PPD:  Negative (1/10/2018)  Hepatitis B: negative  Hepatitis C: negative    Therapy History includes:  Current DMARD therapy includes: methotrexate 20 mg every Monday  Prior DMARD therapy includes: Enbrel (6 weeks)  The following DMARDs have been ineffective: none  The following DMARDs were stopped because of side effects: Enbrel (injection site reaction, increased cough, other unknown)    Immunizations:   Immunization History   Administered Date(s) Administered    TB Skin Test (PPD) Intradermal 01/08/2018, 01/08/2018       Active problems include:    Patient Active Problem List   Diagnosis Code    [facility] M06.9    Anemia D64.9    Long-term use of immunosuppressant medication Z79.899    Primary osteoarthritis of both knees M17.0       HISTORY OF PRESENT ILLNESS    Ms. Dev Murphy returns for a follow-up. On her last visit, I injected her right shoulder, ordered labs and radiographs. I also refilled methotrexate and started [place] Petra, which she has not started pending [employer]. Today, she complains of pain and swelling in her right wrist with stiffness lasting hours. Her elbow feels better. She reports intermittent episodes of numbness on the ball of her feet and toes that resolves. It involves both feet. She complains of a dry cough. She reports having reactive airway disease. Ms. Dev Murphy has continued her medications for arthritis and reports good tolerance without significant side effects.      Last toxicity monitoring by blood work was done on 12/27/2018 and did not reveal any significant adverse effects. Most recent inflammatory markers from 12/27/2018 revealed a ESR 41 mm/hr (previously N/A mm/hr) and CRP 9.5 mg/L (previously N/A mg/L). The patient has not had any interval hospital admissions, infections, or surgeries. REVIEW OF SYSTEMS    A comprehensive review of systems was performed and pertinent results are documented in the HPI, review of systems is otherwise non-contributory. PAST MEDICAL HISTORY    She has a past medical history of Anemia; Reactive airway disease; and Rheumatoid arthritis ([address] (10/24/2017). FAMILY HISTORY    Her family history includes Heart Disease in her father; High Cholesterol in her mother; Thyroid Disease in her mother. SOCIAL HISTORY    She reports that she quit smoking about 6 years ago. She has never used smokeless tobacco. She reports that she does not drink alcohol or use illicit drugs. MEDICATIONS    Current Outpatient Prescriptions   Medication Sig Dispense Refill    tofacitinib (XELJANZ XR) 11 mg Tb24 Take 11 mg by mouth daily for 30 days. Indications: Rheumatoid Arthritis 30 Tab 0    [START ON 4/2/2018] methotrexate (RHEUMATREX) 2.5 mg tablet Take 8 Tabs by mouth every Monday for 90 days. 96 Tab 0    cetirizine (ZYRTEC) 10 mg tablet Take 1 Tab by mouth daily. 90 Tab 1    folic acid (FOLVITE) 1 mg tablet Take 1 mg by mouth daily. Indications: does not take on Mondays      Ibuprofen-diphenhydrAMINE (ADVIL PM) 200-38 mg tab Take 200 mg by mouth as needed.  sennosides (SENNA) 8.6 mg cap Take 8.6 mg by mouth as needed.  tofacitinib (XELJANZ XR) 11 mg Tb24 Take 11 mg by mouth daily for 30 days. 30 Tab 11    ergocalciferol (ERGOCALCIFEROL) 50,000 unit capsule Take 1 Cap by mouth every seven (7) days. 12 Cap 3    montelukast (SINGULAIR) 10 mg tablet Take 10 mg by mouth as needed.           ALLERGIES    No Known Allergies    PHYSICAL EXAMINATION    Visit Vitals    /73 (BP 1 Location: Left arm, BP Patient Position: Sitting)    Pulse 76    Temp 97.8 °F (36.6 °C) (Oral)    Resp 18    Ht 5' 2\" (1.575 m)    Wt 206 lb 12.8 oz (93.8 kg)    SpO2 97%    BMI 37.82 kg/m2     Body mass index is 37.82 kg/(m^2). General: Patient is alert, oriented x 3, not in acute distress    HEENT:   Sclerae are not injected and appear moist.  Oral mucous membranes are moist, there are no ulcers present. There is no alopecia. Neck is supple     Cardiovascular:  Heart is regular rate and rhythm, no murmurs. Chest:  Bibasilar crackles. Extremities:  Free of clubbing, cyanosis, edema    Neurological exam:  No focal sensory deficits, muscle strength is full in upper and lower extremities     Skin exam:  There are no rashes, no alopecia, no discoid lesions, no active Raynaud's, no livedo reticularis, no periungual erythema. Musculoskeletal exam:  A comprehensive musculoskeletal exam was performed for all joints of each upper and lower extremity and assessed for swelling, tenderness and range of motion.  Positive results are documented as below:    Decreased active and passive ROM of right shoulder due to pain  Right elbow flexion contracture (IMPROVED)  Bilateral knee crepitus without effusion with hyperextension    Z-Deformities:   no  [place] Neck Deformities:  no  Boutonierre's Deformities:  no  Ulnar Deviation:   no  MCP Subluxation:  no     Joint Count 3/27/2018 12/27/2017   Patient pain (0-100) 25 25   MHAQ 0.25 0.125   Left wrist- Tender 1 -   Left wrist- Swollen 1 -   Left 1st MCP - Swollen 1 -   Right elbow - Tender - 1   Right elbow - Swollen 1 1   Right wrist- Tender 1 -   Right wrist- Swollen 1 -   Right 3rd MCP - Swollen 1 -   Right 4th MCP - Swollen 1 -   Right 2nd PIP - Swollen 1 -   Right 3rd PIP - Swollen 1 -   Tender Joint Count (Total) 2 1   Swollen Joint Count (Total) 8 1   Physician Assessment (0-10) - 1   Patient Assessment (0-10) 1.5 3   CDAI Total (calculated) - 6       DATA REVIEW    Laboratory     Recent laboratory results were reviewed, summarized, and discussed with the patient. Imaging    Musculoskeletal Ultrasound    Ultrasound of the right elbow. Indication: joint pain. (12/27/17)  Using a NanoNordiq e with 12 Mhz probe, limited views of the right elbow were reviewed. This revealed hypoechoic dopplerable collection and anechoic collection within the olecranon fossa. The tendons were normal. Bony contours were regular without erosions seen. There were no soft tissue masses noted. Impression: right elbow synovitis with effusion    Radiographs    Bilateral Hand 12/27/2017: mild diffuse periarticular osteopenia. The joint space widths are normal. No erosive changes are shown. There is anatomic alignment. No pathological soft tissue calcifications or demonstrated. No localized areas of soft tissue swelling is shown. Right Elbow 12/27/2017: mild diffuse osteopenia. There is a small elbow effusion. There is mild to moderate uniform joint space narrowing which is greatest between the capitellum and radial head. No fracture is shown. No erosive changes are evident. Bilateral Foot 12/27/2017: mild diffuse periarticular osteopenia. Minimal joint space widths are demonstrated bilaterally. There are no erosive changes. No pathologic soft tissue calcifications or localized areas of soft tissue swelling are demonstrated. There is anatomic alignment. Deformity of the distal shaft of the left fifth metatarsal bone is shown suggesting remote, healed fracture. There is no evidence for acute fracture. CT Imaging    None    MR Imaging    None    DXA    None    PATHOLOGY    Ultrasound Guided Right Elbow Kenalog 40 mg IA. (12/27/17)    ASSESSMENT AND PLAN    This is a follow-up visit for Ms. Roe. 1) Seropositive Rheumatoid Arthritis. She is maintained on methotrexate 20 mg every Monday. Her CDAI was 14.5 (previously 6) with 2 tender and 8 swollen joints, consistent with moderate disease activity.  She has not yet received Reubin Dewayne 11 mg XR and is pending [employer] forms to submit. I gave her a sample today and she submitted her [employer] forms today. 2) Long Term Use of Immunosuppressants. The patient remains on immunomodulatory medications (methotrexate) and requires frequent toxicity monitoring by blood work. Respective labs were ordered (CBC and CMP). 3) Bilateral Knee Osteoarthritis. This was not an active issue today. 4) Dyspnea. This is chronic and she attributes to allergies. She was evaluate by pulmonology in the past and was told she had scar tissue in her left lung. Exam revealed bibasilar crackles. i ordered a chest radiograph, CT chest, and pulmonary function tests. 5) Vitamin D Deficiency. Her level was 21.3. She reports that ergocalciferol is too costly and has been taking OTC supplements. I ordered a level today. The patient voiced understanding of the aforementioned assessment and plan. Summary of plan was provided in the After Visit Summary patient instructions. TODAY'S ORDERS    Orders Placed This Encounter    XR CHEST PA LAT    CT CHEST WO CONT    CBC WITH AUTOMATED DIFF    METABOLIC PANEL, COMPREHENSIVE    C REACTIVE PROTEIN, QT    SED RATE (ESR)    VITAMIN D, 25 HYDROXY    PFT COMPLETE    PFT DLCO    tofacitinib (XELJANZ XR) 11 mg Tb24    methotrexate (RHEUMATREX) 2.5 mg tablet       Future Appointments  Date Time Provider Yolanda Val   4/3/2018 11:30 AM [facility] CT ER 1 [facility]CT [facility]   4/3/2018 12:00 PM PULMONARY LAB [facility] SMHPULM [facility]   4/30/2018 2:00 PM Cait Guzman MD [address]   7/26/2018 9:45 AM Yumi Bernal MD [address]  [place] [address], MD, [facility]    Adult Rheumatology   Musculoskeletal Ultrasound Certified  [address] [place] [address], [address]   Phone [phone]  Fax [phone]

## 2018-03-27 NOTE — PATIENT INSTRUCTIONS
Please call the Patient Care Scheduling Team [phone] to schedule your test (PFT, DLCO, CT Chest).

## 2018-03-28 LAB
25(OH)D3+25(OH)D2 SERPL-MCNC: 32.8 NG/ML (ref 30–100)
ALBUMIN SERPL-MCNC: 4.3 G/DL (ref 3.5–5.5)
ALBUMIN/GLOB SERPL: 1.7 {RATIO} (ref 1.2–2.2)
ALP SERPL-CCNC: 102 IU/L (ref 39–117)
ALT SERPL-CCNC: 14 IU/L (ref 0–32)
AST SERPL-CCNC: 24 IU/L (ref 0–40)
BASOPHILS # BLD AUTO: 0 X10E3/UL (ref 0–0.2)
BASOPHILS NFR BLD AUTO: 1 %
BILIRUB SERPL-MCNC: 0.4 MG/DL (ref 0–1.2)
BUN SERPL-MCNC: 22 MG/DL (ref 6–24)
BUN/CREAT SERPL: 29 (ref 9–23)
CALCIUM SERPL-MCNC: 9.3 MG/DL (ref 8.7–10.2)
CHLORIDE SERPL-SCNC: 101 MMOL/L (ref 96–106)
CO2 SERPL-SCNC: 24 MMOL/L (ref 18–29)
CREAT SERPL-MCNC: 0.76 MG/DL (ref 0.57–1)
CRP SERPL-MCNC: 8 MG/L (ref 0–4.9)
EOSINOPHIL # BLD AUTO: 0.2 X10E3/UL (ref 0–0.4)
EOSINOPHIL NFR BLD AUTO: 3 %
ERYTHROCYTE [DISTWIDTH] IN BLOOD BY AUTOMATED COUNT: 14.7 % (ref 12.3–15.4)
ERYTHROCYTE [SEDIMENTATION RATE] IN BLOOD BY WESTERGREN METHOD: 29 MM/HR (ref 0–40)
GFR SERPLBLD CREATININE-BSD FMLA CKD-EPI: 101 ML/MIN/1.73
GFR SERPLBLD CREATININE-BSD FMLA CKD-EPI: 87 ML/MIN/1.73
GLOBULIN SER CALC-MCNC: 2.5 G/DL (ref 1.5–4.5)
GLUCOSE SERPL-MCNC: 77 MG/DL (ref 65–99)
HCT VFR BLD AUTO: 35 % (ref 34–46.6)
HGB BLD-MCNC: 11.5 G/DL (ref 11.1–15.9)
IMM GRANULOCYTES # BLD: 0 X10E3/UL (ref 0–0.1)
IMM GRANULOCYTES NFR BLD: 0 %
LYMPHOCYTES # BLD AUTO: 1 X10E3/UL (ref 0.7–3.1)
LYMPHOCYTES NFR BLD AUTO: 15 %
MCH RBC QN AUTO: 30.6 PG (ref 26.6–33)
MCHC RBC AUTO-ENTMCNC: 32.9 G/DL (ref 31.5–35.7)
MCV RBC AUTO: 93 FL (ref 79–97)
MONOCYTES # BLD AUTO: 0.6 X10E3/UL (ref 0.1–0.9)
MONOCYTES NFR BLD AUTO: 9 %
NEUTROPHILS # BLD AUTO: 4.7 X10E3/UL (ref 1.4–7)
NEUTROPHILS NFR BLD AUTO: 72 %
PLATELET # BLD AUTO: 320 X10E3/UL (ref 150–379)
POTASSIUM SERPL-SCNC: 4.5 MMOL/L (ref 3.5–5.2)
PROT SERPL-MCNC: 6.8 G/DL (ref 6–8.5)
RBC # BLD AUTO: 3.76 X10E6/UL (ref 3.77–5.28)
SODIUM SERPL-SCNC: 140 MMOL/L (ref 134–144)
WBC # BLD AUTO: 6.5 X10E3/UL (ref 3.4–10.8)

## 2018-04-03 ENCOUNTER — HOSPITAL ENCOUNTER (OUTPATIENT)
Dept: PULMONOLOGY | Age: 58
Discharge: HOME | End: 2018-04-03
Attending: INTERNAL MEDICINE
Payer: MEDICARE

## 2018-04-03 ENCOUNTER — HOSPITAL ENCOUNTER (OUTPATIENT)
Dept: CT IMAGING | Age: 58
Discharge: HOME | End: 2018-04-03
Attending: INTERNAL MEDICINE
Payer: MEDICARE

## 2018-04-03 DIAGNOSIS — M05.79 SEROPOSITIVE RHEUMATOID ARTHRITIS OF MULTIPLE SITES (HCC): ICD-10-CM

## 2018-04-03 DIAGNOSIS — R06.00 DYSPNEA, UNSPECIFIED TYPE: ICD-10-CM

## 2018-04-03 PROCEDURE — 71250 CT THORAX DX C-: CPT

## 2018-04-03 PROCEDURE — 94729 DIFFUSING CAPACITY: CPT

## 2018-04-03 PROCEDURE — 94726 PLETHYSMOGRAPHY LUNG VOLUMES: CPT

## 2018-04-03 PROCEDURE — 94010 BREATHING CAPACITY TEST: CPT

## 2018-04-04 NOTE — PROCEDURES
[address]   PULMONARY FUNCTION    Name:Arun SEGUNDO.  MR#: [id number]  : 1960  ACCOUNT #: [de-identified]   DATE OF SERVICE: 2018    CLINICAL INDICATION:  Rheumatoid arthritis. PROCEDURE:  Spirometry, lung volumes and diffusion capacity performed. FINDINGS:  Spirometry is within normal limits. The FEF 25-75 is reduced out of proportion of the vital capacity and FEV1 which may be seen in small airways obstruction. Clinical correlation is recommended. Lung volumes are within normal limits. Diffusion capacity is normal.  Flow-volume loop is suggestive of an obstructive pattern.       aMhamed Lao MD       SCM / RN  D: 2018 11:28     T: 2018 15:07  JOB #: [id number]

## 2018-05-01 ENCOUNTER — OFFICE VISIT (OUTPATIENT)
Dept: RHEUMATOLOGY | Age: 58
End: 2018-05-01

## 2018-05-01 VITALS
TEMPERATURE: 98.5 F | HEIGHT: 62 IN | RESPIRATION RATE: 18 BRPM | HEART RATE: 99 BPM | WEIGHT: 205 LBS | DIASTOLIC BLOOD PRESSURE: 93 MMHG | BODY MASS INDEX: 37.73 KG/M2 | SYSTOLIC BLOOD PRESSURE: 136 MMHG

## 2018-05-01 DIAGNOSIS — Z79.60 LONG-TERM USE OF IMMUNOSUPPRESSANT MEDICATION: ICD-10-CM

## 2018-05-01 DIAGNOSIS — E55.9 VITAMIN D DEFICIENCY: ICD-10-CM

## 2018-05-01 DIAGNOSIS — M05.79 SEROPOSITIVE RHEUMATOID ARTHRITIS OF MULTIPLE SITES (HCC): Primary | ICD-10-CM

## 2018-05-01 DIAGNOSIS — M17.0 PRIMARY OSTEOARTHRITIS OF BOTH KNEES: ICD-10-CM

## 2018-05-01 RX ORDER — METHOTREXATE 2.5 MG/1
20 TABLET ORAL
Qty: 96 TAB | Refills: 0 | Status: SHIPPED | OUTPATIENT
Start: 2018-05-07 | End: 2018-06-15 | Stop reason: SDUPTHER

## 2018-05-01 NOTE — PROGRESS NOTES
REASON FOR VISIT    This is a follow-up visit for Ms. Roe for Seropositive Rheumatoid Arthritis. Inflammatory arthritis phenotype includes:  Anti-CCP positive: yes (>250)  Rheumatoid factor positive: yes (51.6)  Erosive disease: no  Extra-articular manifestations include: bibasilar scarring    Immunosuppression Screening (12/27/2017): Quantiferon TB: indeterminate  PPD:  Negative (1/10/2018)  Hepatitis B: negative  Hepatitis C: negative    Therapy History includes:  Current DMARD therapy includes: methotrexate 20 mg every Monday, Xeljanz 11 mg XR (3/27/2018 to present)  Prior DMARD therapy includes: Enbrel (6 weeks)  The following DMARDs have been ineffective: none  The following DMARDs were stopped because of side effects: Enbrel (injection site reaction, increased cough, other unknown)    Immunizations:   Immunization History   Administered Date(s) Administered    TB Skin Test (PPD) Intradermal 01/08/2018, 01/08/2018       Active problems include:    Patient Active Problem List   Diagnosis Code    Seropositive rheumatoid arthritis of multiple sites ([address] M05.79    Anemia D64.9    Long-term use of immunosuppressant medication Z79.899    Primary osteoarthritis of both knees M17.0    Vitamin D deficiency E55.9       HISTORY OF PRESENT ILLNESS    Ms. Najma Acuña returns for a follow-up. On her last visit, I continued methotrexate 20 mg every Monday and started Xeljanz 11 mg XR. I ordered a CT chest and PFT due to cough. I reviewed these studies with her today, which showed bibasilar scarring without active inflammation. Today, she feels better. Her right elbow, wrist and hand pain have improved. She has swelling in her right elbow. She denies stiffness. Her feet are no longer hurting. She has bruising but is not sure if that is from exercising or Debe Love. Ms. Najma Acuña has continued her medications for arthritis and reports good tolerance without significant side effects.      Last toxicity monitoring by blood work was done on 3/27/2018 and did not reveal any significant adverse effects. Most recent inflammatory markers from 3/27/2018 revealed a ESR 29 mm/hr (previously 41 mm/hr) and CRP 8.0 mg/L (previously 9.5 mg/L). The patient has not had any interval hospital admissions, infections, or surgeries. REVIEW OF SYSTEMS    A comprehensive review of systems was performed and pertinent results are documented in the HPI, review of systems is otherwise non-contributory. PAST MEDICAL HISTORY    She has a past medical history of Anemia; Reactive airway disease; and Rheumatoid arthritis ([address] (10/24/2017). FAMILY HISTORY    Her family history includes Heart Disease in her father; High Cholesterol in her mother; Thyroid Disease in her mother. SOCIAL HISTORY    She reports that she quit smoking about 6 years ago. She has never used smokeless tobacco. She reports that she does not drink alcohol or use illicit drugs. MEDICATIONS    Current Outpatient Prescriptions   Medication Sig Dispense Refill    tofacitinib (XELJANZ XR) 11 mg Tb24 Take  by mouth daily.  [START ON 5/7/2018] methotrexate (RHEUMATREX) 2.5 mg tablet Take 8 Tabs by mouth every Monday for 90 days. 96 Tab 0    cetirizine (ZYRTEC) 10 mg tablet Take 1 Tab by mouth daily. 90 Tab 1    folic acid (FOLVITE) 1 mg tablet Take 1 mg by mouth daily. Indications: does not take on Mondays      Ibuprofen-diphenhydrAMINE (ADVIL PM) 200-38 mg tab Take 200 mg by mouth as needed.  sennosides (SENNA) 8.6 mg cap Take 8.6 mg by mouth as needed. ALLERGIES    No Known Allergies    PHYSICAL EXAMINATION    Visit Vitals    BP (!) 136/93    Pulse 99    Temp 98.5 °F (36.9 °C)    Resp 18    Ht 5' 2\" (1.575 m)    Wt 205 lb (93 kg)    BMI 37.49 kg/m2     Body mass index is 37.49 kg/(m^2).     General: Patient is alert, oriented x 3, not in acute distress    HEENT:   Sclerae are not injected and appear moist.  Oral mucous membranes are moist, there are no ulcers present. There is no alopecia. Neck is supple     Cardiovascular:  Heart is regular rate and rhythm, no murmurs. Chest:  Bibasilar crackles. Extremities:  Free of clubbing, cyanosis, edema    Neurological exam:  No focal sensory deficits, muscle strength is full in upper and lower extremities     Skin exam:  There are no rashes, no alopecia, no discoid lesions, no active Raynaud's, no livedo reticularis, no periungual erythema. Musculoskeletal exam:  A comprehensive musculoskeletal exam was performed for all joints of each upper and lower extremity and assessed for swelling, tenderness and range of motion.  Positive results are documented as below:    Decreased active and passive ROM of right shoulder due to pain  Bilateral knee crepitus without effusion with hyperextension    Z-Deformities:   no  [place] Neck Deformities:  no  Boutonierre's Deformities:  no  Ulnar Deviation:   no  MCP Subluxation:  no     Joint Count 5/1/2018 3/27/2018 12/27/2017   Patient pain (0-100) 10 25 25   MHAQ 0 0.25 0.125   Left wrist- Tender - 1 -   Left wrist- Swollen 1 1 -   Left 1st MCP - Swollen 1 1 -   Left 2nd PIP - Tender 1 - -   Left 2nd PIP - Swollen 1 - -   Left 3rd PIP - Tender 1 - -   Left 3rd PIP - Swollen 1 - -   Left 4th PIP - Tender 1 - -   Left 5th PIP - Tender 1 - -   Right elbow - Tender - - 1   Right elbow - Swollen 1 1 1   Right wrist- Tender 1 1 -   Right wrist- Swollen 1 1 -   Right 1st MCP - Swollen 1 - -   Right 3rd MCP - Swollen 1 1 -   Right 4th MCP - Tender 1 - -   Right 4th MCP - Swollen - 1 -   Right 2nd PIP - Swollen - 1 -   Right 3rd PIP - Tender 1 - -   Right 3rd PIP - Swollen 1 1 -   Right 5th PIP - Tender 1 - -   Right 5th PIP - Swollen 1 - -   Tender Joint Count (Total) 8 2 1   Swollen Joint Count (Total) 10 8 1   Physician Assessment (0-10) 3 3 1   Patient Assessment (0-10) 2.5 1.5 3   CDAI Total (calculated) 23.5 14.5 6       DATA REVIEW    Laboratory Recent laboratory results were reviewed, summarized, and discussed with the patient. Imaging    Musculoskeletal Ultrasound    Ultrasound of the right elbow. Indication: joint pain. (12/27/17)  Using a Proterroiq e with 12 Mhz probe, limited views of the right elbow were reviewed. This revealed hypoechoic dopplerable collection and anechoic collection within the olecranon fossa. The tendons were normal. Bony contours were regular without erosions seen. There were no soft tissue masses noted. Impression: right elbow synovitis with effusion    Radiographs    Chest 3/27/2018: clear lungs. There is a small calcification or bone island overlying the left costophrenic angle. Heart size is normal. There is no pulmonary edema. There is no evident pneumothorax, adenopathy or effusion. Bilateral Hand 12/27/2017: mild diffuse periarticular osteopenia. The joint space widths are normal. No erosive changes are shown. There is anatomic alignment. No pathological soft tissue calcifications or demonstrated. No localized areas of soft tissue swelling is shown. Right Elbow 12/27/2017: mild diffuse osteopenia. There is a small elbow effusion. There is mild to moderate uniform joint space narrowing which is greatest between the capitellum and radial head. No fracture is shown. No erosive changes are evident. Bilateral Foot 12/27/2017: mild diffuse periarticular osteopenia. Minimal joint space widths are demonstrated bilaterally. There are no erosive changes. No pathologic soft tissue calcifications or localized areas of soft tissue swelling are demonstrated. There is anatomic alignment. Deformity of the distal shaft of the left fifth metatarsal bone is shown suggesting remote, healed fracture. There is no evidence for acute fracture. CT Imaging    CT Chest without contrast 4/03/2018: The thyroid gland reveals no nodules. There is no axillary adenopathy. There are  no enlarged mediastinal or hilar lymph nodes.  There are no pleural or  ericardial effusions. No adrenal lesions. The lungs are well aerated. The central airways are patent. There are minimal linear areas of scarring at each lung base. No pneumonia. No pulmonary edema no evidence for fibrosis. Review of bone windows reveals no destructive lesions. MR Imaging    None    DXA    None    PFT    PFT 4/03/2018: FVC of 2.32 (90%), FEV1 of 1.73 (91%), FEV1/FVC of 74.8% and a DLCO of 15.11 (>80%), TLC 3.88 (96%), VC 2.28 (81%). I personally reviewed the images of this study. PATHOLOGY    Ultrasound Guided Right Elbow Kenalog 40 mg IA. (12/27/17)    ASSESSMENT AND PLAN    This is a follow-up visit for Ms. Roe. 1) Seropositive Rheumatoid Arthritis. She is maintained on methotrexate 20 mg every Monday and Xeljanz 11 mg XR and has been on [facility] since her last visit with good tolerance. She feels better. Her CDAI was 23.5 (previously 14.5, 6) with 8 tender and 10 swollen joints, consistent with milagros disease activity. She feels better so I will continue treatment. Labs today and follow up in 3 months. 2) Long Term Use of Immunosuppressants. The patient remains on immunomodulatory medications (methotrexate, [facility]) and requires frequent toxicity monitoring by blood work. Respective labs were ordered (CBC and CMP). 3) Bilateral Knee Osteoarthritis. This was not an active issue today. 4) Dyspnea. This is chronic and she attributes to allergies. She was evaluate by pulmonology in the past and was told she had scar tissue in her left lung. Exam revealed bibasilar crackles. CT Chest showed bibasilar crackles without concerning findings for interstitial lung disease. PFT showed mild small airway obstruction. She was instructed to follow up with pulmonology. 5) Vitamin D Deficiency. Her level was 32/8 (previously 21.3). She reports that ergocalciferol is too costly and has been taking OTC supplements.      The patient voiced understanding of the aforementioned assessment and plan. Summary of plan was provided in the After Visit Summary patient instructions.      TODAY'S ORDERS    Orders Placed This Encounter    CBC WITH AUTOMATED DIFF    METABOLIC PANEL, COMPREHENSIVE    C REACTIVE PROTEIN, QT    SED RATE (ESR)    LIPID PANEL    tofacitinib (XELJANZ XR) 11 mg Tb24    methotrexate (RHEUMATREX) 2.5 mg tablet     Future Appointments  Date Time Provider Yolanda Neal   7/26/2018 9:45 AM Ann Oliver MD [facility] LAURA JIMMIE   8/6/2018 10:00 AM MD Vinny Morin MD, [address] [place] [address]    Adult Rheumatology   Musculoskeletal Ultrasound Certified  [address] [place] [address]   [address] [address]   Phone [phone]  Fax [phone]

## 2018-05-01 NOTE — MR AVS SNAPSHOT
[address] [facility] 
[phone] Patient: Sarah Muller MRN: [id number] NUQ:[id number]/[id number] Visit Information Date & Time Provider Department Dept. Phone Encounter #  
 5/1/2018  9:40 AM Adrianna Jernigan MD [address] [phone] Follow-up Instructions Return in about 3 months (around 8/1/2018). Your Appointments 7/26/2018  9:45 AM  
ROUTINE CARE with Mariah Garcia MD  
[facility]) Appt Note: follow up  
 [address] [address]  
[phone]  
  
   
 [address] [address] Upcoming Health Maintenance Date Due DTaP/Tdap/Td series (1 - Tdap) 9/3/1981 MEDICARE YEARLY EXAM 3/27/2018 Influenza Age 5 to Adult 8/1/2018 FOBT Q 1 YEAR AGE 50-75 11/2/2018 BREAST CANCER SCRN MAMMOGRAM 12/12/2019 PAP AKA CERVICAL CYTOLOGY 1/25/2021 Allergies as of 5/1/2018  Review Complete On: 5/1/2018 By: Mia Wilson RN No Known Allergies Current Immunizations  Reviewed on 1/8/2018 Name Date  
 TB Skin Test (PPD) Intradermal 1/8/2018, 1/8/2018 Not reviewed this visit You Were Diagnosed With   
  
 Codes Comments Seropositive rheumatoid arthritis of multiple sites [facility])    -  Primary ICD-10-CM: M05.79 ICD-9-CM: 714.0 Long-term use of immunosuppressant medication     ICD-10-CM: Z79.899 ICD-9-CM: V58.69 Vitamin D deficiency     ICD-10-CM: E55.9 ICD-9-CM: 268.9 Vitals BP Pulse Temp Resp Height(growth percentile) Weight(growth percentile) (!) 136/93 99 98.5 °F (36.9 °C) 18 5' 2\" (1.575 m) 205 lb (93 kg) BMI OB Status Smoking Status 37.49 kg/m2 Menopause Former Smoker BMI and BSA Data Body Mass Index Body Surface Area  
 37.49 kg/m 2 2.02 m 2 Preferred Pharmacy Pharmacy Name Phone [facility], [address] [place] [address] [phone] Your Updated Medication List  
  
   
This list is accurate as of 5/1/18 10:52 AM.  Always use your most recent med list. ADVIL -38 mg Tab Generic drug:  Ibuprofen-diphenhydrAMINE Take 200 mg by mouth as needed. cetirizine 10 mg tablet Commonly known as:  ZYRTEC Take 1 Tab by mouth daily. folic acid 1 mg tablet Commonly known as:  Google Take 1 mg by mouth daily. Indications: does not take on Mondays  
  
 methotrexate 2.5 mg tablet Commonly known as:  Leblanc Everson Take 8 Tabs by mouth every Monday for 90 days. Start taking on:  5/7/2018 Senna 8.6 mg Cap Generic drug:  sennosides Take 8.6 mg by mouth as needed. XELJANZ XR 11 mg Tb24 Generic drug:  tofacitinib Take  by mouth daily. Prescriptions Sent to Pharmacy Refills  
 methotrexate (RHEUMATREX) 2.5 mg tablet 0 Starting on: 5/7/2018 Sig: Take 8 Tabs by mouth every Monday for 90 days. Class: Normal  
 Pharmacy: [facility], [address] Ph #: [phone] Route: Oral  
  
We Performed the Following C REACTIVE PROTEIN, QT [95079 CPT(R)] CBC WITH AUTOMATED DIFF [20874 CPT(R)] LIPID PANEL [68734 CPT(R)] METABOLIC PANEL, COMPREHENSIVE [94082 CPT(R)] SED RATE (ESR) [id number] CPT(R)] Follow-up Instructions Return in about 3 months (around 8/1/2018). Introducing [facility]! Dear Shanta Hash: 
Thank you for requesting a Thinkr account. Our records indicate that you already have an active Thinkr account. You can access your account anytime at [email or web address] Did you know that you can access your hospital and ER discharge instructions at any time in Thinkr?   You can also review all of your test results from your hospital stay or ER visit. Additional Information If you have questions, please visit the Frequently Asked Questions section of the BDA website at [email or web address] Remember, BDA is NOT to be used for urgent needs. For medical emergencies, dial 911. Now available from your iPhone and Android! Please provide this summary of care documentation to your next provider. Your primary care clinician is listed as Dex Denny. If you have any questions after today's visit, please call [phone].

## 2018-05-02 LAB
ALBUMIN SERPL-MCNC: 4.2 G/DL (ref 3.5–5.5)
ALBUMIN/GLOB SERPL: 1.8 {RATIO} (ref 1.2–2.2)
ALP SERPL-CCNC: 99 IU/L (ref 39–117)
ALT SERPL-CCNC: 17 IU/L (ref 0–32)
AST SERPL-CCNC: 30 IU/L (ref 0–40)
BASOPHILS # BLD AUTO: 0 X10E3/UL (ref 0–0.2)
BASOPHILS NFR BLD AUTO: 0 %
BILIRUB SERPL-MCNC: 0.3 MG/DL (ref 0–1.2)
BUN SERPL-MCNC: 20 MG/DL (ref 6–24)
BUN/CREAT SERPL: 25 (ref 9–23)
CALCIUM SERPL-MCNC: 9.6 MG/DL (ref 8.7–10.2)
CHLORIDE SERPL-SCNC: 100 MMOL/L (ref 96–106)
CHOLEST SERPL-MCNC: 224 MG/DL (ref 100–199)
CO2 SERPL-SCNC: 26 MMOL/L (ref 18–29)
CREAT SERPL-MCNC: 0.8 MG/DL (ref 0.57–1)
CRP SERPL-MCNC: 4.8 MG/L (ref 0–4.9)
EOSINOPHIL # BLD AUTO: 0.2 X10E3/UL (ref 0–0.4)
EOSINOPHIL NFR BLD AUTO: 2 %
ERYTHROCYTE [DISTWIDTH] IN BLOOD BY AUTOMATED COUNT: 15.2 % (ref 12.3–15.4)
ERYTHROCYTE [SEDIMENTATION RATE] IN BLOOD BY WESTERGREN METHOD: 28 MM/HR (ref 0–40)
GFR SERPLBLD CREATININE-BSD FMLA CKD-EPI: 82 ML/MIN/1.73
GFR SERPLBLD CREATININE-BSD FMLA CKD-EPI: 95 ML/MIN/1.73
GLOBULIN SER CALC-MCNC: 2.4 G/DL (ref 1.5–4.5)
GLUCOSE SERPL-MCNC: 76 MG/DL (ref 65–99)
HCT VFR BLD AUTO: 36.2 % (ref 34–46.6)
HDLC SERPL-MCNC: 65 MG/DL
HGB BLD-MCNC: 11.7 G/DL (ref 11.1–15.9)
IMM GRANULOCYTES # BLD: 0 X10E3/UL (ref 0–0.1)
IMM GRANULOCYTES NFR BLD: 0 %
LDLC SERPL CALC-MCNC: 138 MG/DL (ref 0–99)
LYMPHOCYTES # BLD AUTO: 1.2 X10E3/UL (ref 0.7–3.1)
LYMPHOCYTES NFR BLD AUTO: 16 %
MCH RBC QN AUTO: 29.8 PG (ref 26.6–33)
MCHC RBC AUTO-ENTMCNC: 32.3 G/DL (ref 31.5–35.7)
MCV RBC AUTO: 92 FL (ref 79–97)
MONOCYTES # BLD AUTO: 0.6 X10E3/UL (ref 0.1–0.9)
MONOCYTES NFR BLD AUTO: 7 %
NEUTROPHILS # BLD AUTO: 5.6 X10E3/UL (ref 1.4–7)
NEUTROPHILS NFR BLD AUTO: 75 %
PLATELET # BLD AUTO: 288 X10E3/UL (ref 150–379)
POTASSIUM SERPL-SCNC: 4.5 MMOL/L (ref 3.5–5.2)
PROT SERPL-MCNC: 6.6 G/DL (ref 6–8.5)
RBC # BLD AUTO: 3.92 X10E6/UL (ref 3.77–5.28)
SODIUM SERPL-SCNC: 140 MMOL/L (ref 134–144)
TRIGL SERPL-MCNC: 106 MG/DL (ref 0–149)
VLDLC SERPL CALC-MCNC: 21 MG/DL (ref 5–40)
WBC # BLD AUTO: 7.5 X10E3/UL (ref 3.4–10.8)

## 2018-05-22 RX ORDER — CETIRIZINE HCL 10 MG
10 TABLET ORAL DAILY
Qty: 90 TAB | Refills: 1 | Status: CANCELLED | OUTPATIENT
Start: 2018-05-22

## 2018-05-23 NOTE — TELEPHONE ENCOUNTER
From: Janis Roe  To:  Olena Gloria MD  Sent: 5/22/2018 7:34 PM EDT  Subject: Medication Renewal Request    Original authorizing provider: Olena Gloria MD    [facility] would like a refill of the following medications:  cetirizine (ZYRTEC) 10 mg tablet Olena Gloria MD]    Preferred pharmacy: [address] [place] [address], [address] [place] [address]    Comment:

## 2018-06-15 DIAGNOSIS — M05.79 SEROPOSITIVE RHEUMATOID ARTHRITIS OF MULTIPLE SITES (HCC): ICD-10-CM

## 2018-06-18 RX ORDER — METHOTREXATE 2.5 MG/1
20 TABLET ORAL
Qty: 96 TAB | Refills: 0 | Status: SHIPPED | OUTPATIENT
Start: 2018-06-18 | End: 2018-08-06 | Stop reason: SDUPTHER

## 2018-06-18 NOTE — TELEPHONE ENCOUNTER
From: Samantha Roe  To: Rhett Marquez MD  Sent: 6/15/2018 9:13 AM EDT  Subject: Medication Renewal Request    Original authorizing provider: Rhett Marquez MD    [place] [address] would like a refill of the following medications:  methotrexate (RHEUMATREX) 2.5 mg tablet Rhett Marquez MD]    Preferred pharmacy: [address] [place] [address], [address] [place] [address] Way:

## 2018-07-01 ENCOUNTER — PATIENT MESSAGE (OUTPATIENT)
Dept: RHEUMATOLOGY | Age: 58
End: 2018-07-01

## 2018-07-02 RX ORDER — PREDNISONE 5 MG/1
TABLET ORAL
Qty: 70 TAB | Refills: 0 | Status: SHIPPED | OUTPATIENT
Start: 2018-07-02 | End: 2018-08-03 | Stop reason: ALTCHOICE

## 2018-07-02 NOTE — TELEPHONE ENCOUNTER
From: Aruna Qureshi  Sent: 7/2/2018 7:55 AM EDT  To: Marianna Osuna MD  Subject: RE: Non-Urgent Medical Question    I would prefer the prednisone  ----- Message -----  From: Marianna Osuna MD  Sent: 7/2/2018 6:24 AM EDT  To: Aruna Qureshi  Subject: RE: Non-Urgent Medical Question    Sorry to hear that. Would like you an injection in the affected joint instead?      ----- Message -----   From: Aruna Qureshi   Sent: 7/1/2018 4:24 PM EDT   To: Marianna Osuna MD  Subject: Non-Urgent Medical Question    Dr. Beverley Miller    I'm experiencing a painful flare up in my left foot, causing it to be painful especially when walking.  Can you prescribe some prednisone for me. ?    Thank you     Aruna Qureshi  [phone]

## 2018-08-03 ENCOUNTER — OFFICE VISIT (OUTPATIENT)
Dept: FAMILY MEDICINE CLINIC | Age: 58
End: 2018-08-03

## 2018-08-03 VITALS
TEMPERATURE: 97.8 F | DIASTOLIC BLOOD PRESSURE: 86 MMHG | RESPIRATION RATE: 18 BRPM | WEIGHT: 209 LBS | OXYGEN SATURATION: 98 % | HEART RATE: 85 BPM | BODY MASS INDEX: 38.46 KG/M2 | HEIGHT: 62 IN | SYSTOLIC BLOOD PRESSURE: 124 MMHG

## 2018-08-03 DIAGNOSIS — R21 RASH: ICD-10-CM

## 2018-08-03 DIAGNOSIS — B07.9 VIRAL WARTS, UNSPECIFIED TYPE: ICD-10-CM

## 2018-08-03 DIAGNOSIS — E78.5 HYPERLIPIDEMIA, UNSPECIFIED HYPERLIPIDEMIA TYPE: Primary | ICD-10-CM

## 2018-08-03 PROBLEM — E66.01 SEVERE OBESITY (BMI 35.0-39.9): Status: ACTIVE | Noted: 2018-08-03

## 2018-08-03 NOTE — PATIENT INSTRUCTIONS
Compound W = wart remove Warts: Care Instructions Your Care Instructions A wart is a harmless skin growth caused by a virus. The virus makes the top layer of skin grow quickly, causing a wart. Warts usually go away on their own in months or years. There are several types of warts. Common warts appear most often on the hands, but they may be anywhere on the body. Plantar warts occur on the soles of the feet and may cause pain when you walk. Warts spread easily. You can reinfect yourself by touching the wart and then touching another part of your body. You can infect others by sharing towels, razors, or other personal items. Most warts do not need treatment and go away on their own. But if warts cause pain or spread, your doctor may recommend that you use an over-the-counter treatment. These include salicylic acid or duct tape. Or your doctor may prescribe a stronger medicine to put on warts or may inject them with medicine. The doctor also can remove warts through surgery or by freezing them. Follow-up care is a key part of your treatment and safety. Be sure to make and go to all appointments, and call your doctor if you are having problems. It's also a good idea to know your test results and keep a list of the medicines you take. How can you care for yourself at home? For common warts · Use salicylic acid or duct tape as your doctor directs. You put the medicine or the tape on a wart for several days and then file down the dead skin on the wart. You use the salicylic acid treatment for 2 to 3 months or the tape for 1 to 2 months. · If your doctor prescribes medicine to put on warts, use it exactly as directed. Call your doctor if you think you are having a problem with your medicine. For plantar (foot) warts · Wear comfortable shoes and socks. Avoid high heels and shoes that put a lot of pressure on your foot. · Pad the wart with doughnut-shaped felt or a moleskin patch.  You can buy these at a drugstore. Put the pad around the plantar wart so that it relieves pressure on the wart. You also can place pads or cushions in your shoes to make walking more comfortable. · Take an over-the-counter pain medicine, such as acetaminophen (Tylenol), ibuprofen (Advil, Motrin), or naproxen (Aleve). Read and follow all instructions on the label. · Do not take two or more pain medicines at the same time unless the doctor told you to. Many pain medicines have acetaminophen, which is Tylenol. Too much acetaminophen (Tylenol) can be harmful. To avoid spreading warts · Keep warts covered with a bandage or athletic tape. · Do not bite your nails or cuticles. This may spread warts from one finger to another. When should you call for help? Call your doctor now or seek immediate medical care if: 
  · You have signs of infection, such as: 
¨ Increased pain, swelling, warmth, or redness. ¨ Red streaks leading from a wart. ¨ Pus draining from a wart. ¨ A fever.  
 Watch closely for changes in your health, and be sure to contact your doctor if: 
  · You do not get better as expected. Where can you learn more? Go to http://saw-fifi.info/. Enter T140 in the search box to learn more about \"Warts: Care Instructions. \" Current as of: October 5, 2017 Content Version: 11.7 © 4611-7075 [employer]. Care instructions adapted under license by [employer] (which disclaims liability or warranty for this information). If you have questions about a medical condition or this instruction, always ask your healthcare professional. [place] [address] any warranty or liability for your use of this information.

## 2018-08-03 NOTE — PROGRESS NOTES
Chief Complaint Patient presents with  Medication Evaluation  Labs  
  patient has questions about her cholesterol levels  Skin Problem  
  rash on left arma, bump on the palm of left hand x 3 months 1. Have you been to the ER, urgent care clinic since your last visit? Hospitalized since your last visit? No 
 
2. Have you seen or consulted any other health care providers outside of the [facility] since your last visit? Include any pap smears or colon screening.  No

## 2018-08-03 NOTE — MR AVS SNAPSHOT
[facility] 
 
 
 222 [facility] [address] [place] [address] 
[phone] Patient: Tanner Mcdaniel MRN: [id number] NMC:0/6/3489 Visit Information Date & Time Provider Department Dept. Phone Encounter #  
 8/3/2018  1:30 PM Violeta De Anda  W [facility] [phone] [id number] Follow-up Instructions Return in about 6 months (around 2/3/2019) for HLD follow up. Your Appointments 8/6/2018 10:00 AM  
ESTABLISHED PATIENT with Geetha Jimenez MD  
[address] Verma ([facility] Appt Note: 3 month f/up  
 [facility] [address] [facility] [id number]  
[phone]  
  
   
 [facility] [address] [address] Upcoming Health Maintenance Date Due DTaP/Tdap/Td series (1 - Tdap) 9/3/1981 Influenza Age 5 to Adult 8/1/2018 FOBT Q 1 YEAR AGE 50-75 11/2/2018 BREAST CANCER SCRN MAMMOGRAM 12/12/2019 PAP AKA CERVICAL CYTOLOGY 1/25/2021 Allergies as of 8/3/2018  Review Complete On: 8/3/2018 By: Brien Moser No Known Allergies Current Immunizations  Reviewed on 1/8/2018 Name Date  
 TB Skin Test (PPD) Intradermal 1/8/2018, 1/8/2018 Not reviewed this visit Vitals BP Pulse Temp Resp Height(growth percentile) Weight(growth percentile) 124/86 (BP 1 Location: Right arm, BP Patient Position: Sitting) 85 97.8 °F (36.6 °C) (Oral) 18 5' 2\" (1.575 m) 209 lb (94.8 kg) SpO2 BMI OB Status Smoking Status 98% 38.23 kg/m2 Menopause Former Smoker Vitals History BMI and BSA Data Body Mass Index Body Surface Area  
 38.23 kg/m 2 2.04 m 2 Preferred Pharmacy Pharmacy Name Phone [facility] DR[facility] STORE [facility], [address] [place] [address] [phone] Your Updated Medication List  
  
   
This list is accurate as of 8/3/18  2:06 PM.  Always use your most recent med list.  
 ADVIL -38 mg Tab Generic drug:  Ibuprofen-diphenhydrAMINE Take 200 mg by mouth as needed. cetirizine 10 mg tablet Commonly known as:  ZYRTEC Take 1 Tab by mouth daily. folic acid 1 mg tablet Commonly known as:  Google Take 1 mg by mouth daily. Indications: does not take on Mondays  
  
 methotrexate 2.5 mg tablet Commonly known as:  Azael Tabor Take 8 Tabs by mouth every Monday for 90 days. Senna 8.6 mg Cap Generic drug:  sennosides Take 8.6 mg by mouth as needed. XELJANZ XR 11 mg Tb24 Generic drug:  tofacitinib Take  by mouth daily. Follow-up Instructions Return in about 6 months (around 2/3/2019) for HLD follow up. Patient Instructions Compound W = wart remove Warts: Care Instructions Your Care Instructions A wart is a harmless skin growth caused by a virus. The virus makes the top layer of skin grow quickly, causing a wart. Warts usually go away on their own in months or years. There are several types of warts. Common warts appear most often on the hands, but they may be anywhere on the body. Plantar warts occur on the soles of the feet and may cause pain when you walk. Warts spread easily. You can reinfect yourself by touching the wart and then touching another part of your body. You can infect others by sharing towels, razors, or other personal items. Most warts do not need treatment and go away on their own. But if warts cause pain or spread, your doctor may recommend that you use an over-the-counter treatment. These include salicylic acid or duct tape. Or your doctor may prescribe a stronger medicine to put on warts or may inject them with medicine. The doctor also can remove warts through surgery or by freezing them. Follow-up care is a key part of your treatment and safety.  Be sure to make and go to all appointments, and call your doctor if you are having problems. It's also a good idea to know your test results and keep a list of the medicines you take. How can you care for yourself at home? For common warts · Use salicylic acid or duct tape as your doctor directs. You put the medicine or the tape on a wart for several days and then file down the dead skin on the wart. You use the salicylic acid treatment for 2 to 3 months or the tape for 1 to 2 months. · If your doctor prescribes medicine to put on warts, use it exactly as directed. Call your doctor if you think you are having a problem with your medicine. For plantar (foot) warts · Wear comfortable shoes and socks. Avoid high heels and shoes that put a lot of pressure on your foot. · Pad the wart with doughnut-shaped felt or a moleskin patch. You can buy these at a [employer]. Put the pad around the plantar wart so that it relieves pressure on the wart. You also can place pads or cushions in your shoes to make walking more comfortable. · Take an over-the-counter pain medicine, such as acetaminophen (Tylenol), ibuprofen (Advil, Motrin), or naproxen (Aleve). Read and follow all instructions on the label. · Do not take two or more pain medicines at the same time unless the doctor told you to. Many pain medicines have acetaminophen, which is Tylenol. Too much acetaminophen (Tylenol) can be harmful. To avoid spreading warts · Keep warts covered with a bandage or athletic tape. · Do not bite your nails or cuticles. This may spread warts from one finger to another. When should you call for help? Call your doctor now or seek immediate medical care if: 
  · You have signs of infection, such as: 
¨ Increased pain, swelling, warmth, or redness. ¨ Red streaks leading from a wart. ¨ Pus draining from a wart. ¨ A fever.  
 Watch closely for changes in your health, and be sure to contact your doctor if: 
  · You do not get better as expected. Where can you learn more? Go to http://saw-fifi.info/. Enter U686 in the search box to learn more about \"Warts: Care Instructions. \" Current as of: October 5, 2017 Content Version: 11.7 © 2718-8505 [employer]Pour[employer]. Care instructions adapted under license by [employer] (which disclaims liability or warranty for this information). If you have questions about a medical condition or this instruction, always ask your healthcare professional. [address] any warranty or liability for your use of this information. Introducing hospitals & HEALTH SERVICES! Dear Sherin Badillo: 
Thank you for requesting a TwoF account. Our records indicate that you already have an active TwoF account. You can access your account anytime at [email or web address] Did you know that you can access your hospital and ER discharge instructions at any time in TwoF? You can also review all of your test results from your hospital stay or ER visit. Additional Information If you have questions, please visit the Frequently Asked Questions section of the TwoF website at [email or web address] Remember, TwoF is NOT to be used for urgent needs. For medical emergencies, dial 911. Now available from your iPhone and Android! Please provide this summary of care documentation to your next provider. Your primary care clinician is listed as Dex Denny. If you have any questions after today's visit, please call [phone].

## 2018-08-03 NOTE — PROGRESS NOTES
Patient Name: Mae Borrego MRN: [id number] SUBJECTIVE Mae Borrego is a 62 y.o. female who presents with the following:  
 
Patient states that she recently had a  flareup of her RA and required to be on prednisone. States that this limited her ability to exercise but plans to restart soon. Is currently getting certification for a service pet for her RA. She will plan to ask her rheumatologist if they would be willing to write a letter to allow her to have a service dog within her apartment complex at her appt next week given her hx of RA. Has noticed a dry scaly rash on both of her elbows. Does tend to rest her elbows at desk at work. Has been using topical Neosporin without much improvement. Denies any pain or itching. Has had a small lesion on the palm of her left hand for several months. Has been digging at it but it has not decreased in size. Review of Systems Constitutional: Negative for fever, malaise/fatigue and weight loss. Respiratory: Negative for cough, hemoptysis, shortness of breath and wheezing. Cardiovascular: Negative for chest pain, palpitations, leg swelling and PND. Gastrointestinal: Negative for abdominal pain, constipation, diarrhea, nausea and vomiting. Skin: Positive for rash. The patient's medications, allergies, past medical history, surgical history, family history and social history were reviewed and updated where appropriate. Prior to Admission medications Medication Sig Start Date End Date Taking? Authorizing Provider  
methotrexate (RHEUMATREX) 2.5 mg tablet Take 8 Tabs by mouth every Monday for 90 days. 6/18/18 9/16/18 Yes Jessica Samuels MD  
tofacitinib (XELJANZ XR) 11 mg Tb24 Take  by mouth daily. Yes Historical Provider  
cetirizine (ZYRTEC) 10 mg tablet Take 1 Tab by mouth daily. 2/20/18  Yes Katerin Prakash MD  
folic acid (FOLVITE) 1 mg tablet Take 1 mg by mouth daily.  Indications: does not take on Mondays   Yes Historical Provider Ibuprofen-diphenhydrAMINE (ADVIL PM) 200-38 mg tab Take 200 mg by mouth as needed. Yes Historical Provider  
sennosides (SENNA) 8.6 mg cap Take 8.6 mg by mouth as needed. Yes Historical Provider No Known Allergies OBJECTIVE Visit Vitals  /86 (BP 1 Location: Right arm, BP Patient Position: Sitting)  Pulse 85  Temp 97.8 °F (36.6 °C) (Oral)  Resp 18  Ht 5' 2\" (1.575 m)  Wt 209 lb (94.8 kg)  SpO2 98%  BMI 38.23 kg/m2 Physical Exam  
Constitutional: She is oriented to person, place, and time and well-developed, well-nourished, and in no distress. No distress. Eyes: Conjunctivae and EOM are normal. Pupils are equal, round, and reactive to light. Musculoskeletal: Normal range of motion. Neurological: She is alert and oriented to person, place, and time. Gait normal.  
Skin: Skin is warm and dry. Rash (Dry rash around hair follicles along both elbows. No surrounding redness or discharge. 0.5 x 0.5 cm plantar wart on left hand palm.) noted. She is not diaphoretic. Psychiatric: Mood, memory, affect and judgment normal.  
Nursing note and vitals reviewed. ASSESSMENT AND PLAN Neptali Marie is a 62 y.o. female who presents today for: 1. Hyperlipidemia, unspecified hyperlipidemia type ASCVD risk is low therefore hold off on statin. I have reviewed/discussed the above normal BMI with the patient. I have recommended the following interventions: dietary management education, guidance, and counseling, encourage exercise, monitor weight and prescribed dietary intake. 2. Rash Dry skin on elbows; Recommend moisturizing lotion daily. 3. Viral warts, unspecified type Recommend OTC topical therapy; reviewed that it may take several weeks to resolve. Medications Discontinued During This Encounter Medication Reason  predniSONE (DELTASONE) 5 mg tablet Therapy Completed Follow-up Disposition: 
Return in about 6 months (around 2/3/2019) for HLD follow up. Medication risks/benefits/costs/interactions/alternatives discussed with patient. Advised patient to call back or return to office if symptoms worsen/change/persist. If patient cannot reach us or should anything more severe/urgent arise he/she should proceed directly to the nearest emergency department. Discussed expected course/resolution/complications of diagnosis in detail with patient. Patient given a written after visit summary which includes his/her diagnoses, current medications and vitals. Patient expressed understanding with the diagnosis and plan.   
 
Renae Veliz M.D.

## 2018-08-06 ENCOUNTER — OFFICE VISIT (OUTPATIENT)
Dept: RHEUMATOLOGY | Age: 58
End: 2018-08-06

## 2018-08-06 VITALS
DIASTOLIC BLOOD PRESSURE: 88 MMHG | HEART RATE: 80 BPM | BODY MASS INDEX: 37.91 KG/M2 | HEIGHT: 62 IN | RESPIRATION RATE: 18 BRPM | SYSTOLIC BLOOD PRESSURE: 130 MMHG | TEMPERATURE: 98 F | WEIGHT: 206 LBS

## 2018-08-06 DIAGNOSIS — M17.0 PRIMARY OSTEOARTHRITIS OF BOTH KNEES: ICD-10-CM

## 2018-08-06 DIAGNOSIS — M05.79 SEROPOSITIVE RHEUMATOID ARTHRITIS OF MULTIPLE SITES (HCC): Primary | ICD-10-CM

## 2018-08-06 DIAGNOSIS — E55.9 VITAMIN D DEFICIENCY: ICD-10-CM

## 2018-08-06 DIAGNOSIS — Z79.60 LONG-TERM USE OF IMMUNOSUPPRESSANT MEDICATION: ICD-10-CM

## 2018-08-06 RX ORDER — METHOTREXATE 2.5 MG/1
20 TABLET ORAL
Qty: 96 TAB | Refills: 0 | Status: SHIPPED | OUTPATIENT
Start: 2018-08-06 | End: 2018-08-18 | Stop reason: SDUPTHER

## 2018-08-06 RX ORDER — TRIAMCINOLONE ACETONIDE 40 MG/ML
80 INJECTION, SUSPENSION INTRA-ARTICULAR; INTRAMUSCULAR ONCE
Qty: 2 ML | Refills: 0
Start: 2018-08-06 | End: 2018-08-06

## 2018-08-06 NOTE — MR AVS SNAPSHOT
[address] [place] [address] 
[phone] Patient: Nicolle Cortez MRN: [id number] KAX:[birth year] Visit Information Date & Time Provider Department Dept. Phone Encounter #  
 8/6/2018 10:00 AM Melody Sutton [id number] Follow-up Instructions Return in about 3 months (around 11/6/2018). Your Appointments 2/8/2019  8:15 AM  
ROUTINE CARE with Nathan Amado MD  
[facility]) Appt Note: 6 month follow up visit [address] [address]  
[phone]  
  
   
 [address] [address] Upcoming Health Maintenance Date Due DTaP/Tdap/Td series (1 - Tdap) 9/3/1981 Influenza Age 5 to Adult 8/1/2018 MEDICARE YEARLY EXAM 8/3/2018 FOBT Q 1 YEAR AGE 50-75 11/2/2018 BREAST CANCER SCRN MAMMOGRAM 12/12/2019 PAP AKA CERVICAL CYTOLOGY 1/25/2021 Allergies as of 8/6/2018  Review Complete On: 8/6/2018 By: Paul Cedeno RN No Known Allergies Current Immunizations  Reviewed on 1/8/2018 Name Date  
 TB Skin Test (PPD) Intradermal 1/8/2018, 1/8/2018 Not reviewed this visit You Were Diagnosed With   
  
 Codes Comments Seropositive rheumatoid arthritis of multiple sites [facility])    -  Primary ICD-10-CM: M05.79 ICD-9-CM: 714.0 Long-term use of immunosuppressant medication     ICD-10-CM: Z79.899 ICD-9-CM: V58.69 Vitals BP Pulse Temp Resp Height(growth percentile) Weight(growth percentile) 130/88 80 98 °F (36.7 °C) 18 5' 2\" (1.575 m) 206 lb (93.4 kg) BMI OB Status Smoking Status 37.68 kg/m2 Menopause Former Smoker BMI and BSA Data Body Mass Index Body Surface Area  
 37.68 kg/m 2 2.02 m 2 Preferred Pharmacy Pharmacy Name Phone [facility] DR[facility] STORE [facility], [address] [place] [address] [phone] Your Updated Medication List  
  
   
This list is accurate as of 8/6/18 10:22 AM.  Always use your most recent med list. ADVIL -38 mg Tab Generic drug:  Ibuprofen-diphenhydrAMINE Take 200 mg by mouth as needed. cetirizine 10 mg tablet Commonly known as:  ZYRTEC Take 1 Tab by mouth daily. folic acid 1 mg tablet Commonly known as:  Google Take 1 mg by mouth daily. Indications: does not take on Mondays  
  
 methotrexate 2.5 mg tablet Commonly known as:  Madison Heriberto Take 8 Tabs by mouth every Monday. Senna 8.6 mg Cap Generic drug:  sennosides Take 8.6 mg by mouth as needed. triamcinolone acetonide 40 mg/mL injection Commonly known as:  KENALOG 2 mL by IntraMUSCular route once for 1 dose. XELJANZ XR 11 mg Tb24 Generic drug:  tofacitinib Take  by mouth daily. Prescriptions Sent to Pharmacy Refills  
 methotrexate (RHEUMATREX) 2.5 mg tablet 0 Sig: Take 8 Tabs by mouth every Monday. Class: Normal  
 Pharmacy: [facility], [address] Ph #: [phone] Route: Oral  
  
We Performed the Following C REACTIVE PROTEIN, QT [82165 CPT(R)] CBC WITH AUTOMATED DIFF [89258 CPT(R)] METABOLIC PANEL, COMPREHENSIVE [88898 CPT(R)] SED RATE (ESR) [id number] CPT(R)] THER/PROPH/DIAG INJECTION, SUBCUT/IM [id number] CPT(R)] TRIAMCINOLONE ACETONIDE INJ [ [facility] [place]] Follow-up Instructions Return in about 3 months (around 11/6/2018). Introducing [facility] & HEALTH SERVICES! Dear Aspen Almodovar: 
Thank you for requesting a Tizra account. Our records indicate that you already have an active Tizra account. You can access your account anytime at [email or web address] Did you know that you can access your hospital and ER discharge instructions at any time in SpamLion? You can also review all of your test results from your hospital stay or ER visit. Additional Information If you have questions, please visit the Frequently Asked Questions section of the SpamLion website at [email or web address] Remember, SpamLion is NOT to be used for urgent needs. For medical emergencies, dial 911. Now available from your iPhone and Android! Please provide this summary of care documentation to your next provider. Your primary care clinician is listed as Dex Denny. If you have any questions after today's visit, please call [phone].

## 2018-08-06 NOTE — PROGRESS NOTES
REASON FOR VISIT    This is a follow-up visit for Ms. Roe for Seropositive Rheumatoid Arthritis. Inflammatory arthritis phenotype includes:  Anti-CCP positive: yes (>250)  Rheumatoid factor positive: yes (51.6)  Erosive disease: no  Extra-articular manifestations include: bibasilar scarring    Immunosuppression Screening (12/27/2017): Quantiferon TB: indeterminate  PPD:  Negative (1/10/2018)  Hepatitis B: negative  Hepatitis C: negative    Therapy History includes:  Current DMARD therapy includes: methotrexate 20 mg every Monday, Xeljanz 11 mg XR (3/27/2018 to present)  Prior DMARD therapy includes: Enbrel (6 weeks)  The following DMARDs have been ineffective: none  The following DMARDs were stopped because of side effects: Enbrel (injection site reaction, increased cough, other unknown)    Immunizations:   Immunization History   Administered Date(s) Administered    TB Skin Test (PPD) Intradermal 01/08/2018, 01/08/2018       Active problems include:    Patient Active Problem List   Diagnosis Code    Seropositive rheumatoid arthritis of multiple sites ([facility] M05.79    Anemia D64.9    Long-term use of immunosuppressant medication Z79.899    Primary osteoarthritis of both knees M17.0    Vitamin D deficiency E55.9    Severe obesity (BMI 35.0-39.9) ([facility] E66.01       HISTORY OF PRESENT ILLNESS    Ms. Tushar Dueñas returns for a follow-up. On her last visit, I continued methotrexate 20 mg every Monday and Xeljanz 11 mg XR with good tolerance. In 7/01/2018, she contacted me due to a flare in her left foot (pain and swelling in her big toe). She was given a prednisone taper with some relief. She also reports having stress due to acute illness of her son and moving, so was under physiologic stress. Today, she continues to feel swelling and pain in her left 1st toe and now has mild right foot. She has swelling in the morning in her hands without pain or stiffness.  She has mild intermittent pain in her elbows. Ms. Wilfredo Scott has continued her medications for arthritis and reports good tolerance without significant side effects. Last toxicity monitoring by blood work was done on 5/01/2018 and did not reveal any significant adverse effects. Most recent inflammatory markers from 5/01/2018 revealed a ESR 28 mm/hr (previously 29, 41 mm/hr) and CRP 4.8 mg/L (previously 8.0, 9.5 mg/L). The patient has not had any interval hospital admissions, infections, or surgeries. REVIEW OF SYSTEMS    A comprehensive review of systems was performed and pertinent results are documented in the HPI, review of systems is otherwise non-contributory. PAST MEDICAL HISTORY    She has a past medical history of Anemia; Reactive airway disease; and Rheumatoid arthritis ([address] (10/24/2017). FAMILY HISTORY    Her family history includes Heart Disease in her father; High Cholesterol in her mother; Thyroid Disease in her mother. SOCIAL HISTORY    She reports that she quit smoking about 6 years ago. She has never used smokeless tobacco. She reports that she does not drink alcohol or use illicit drugs. MEDICATIONS    Current Outpatient Prescriptions   Medication Sig Dispense Refill    methotrexate (RHEUMATREX) 2.5 mg tablet Take 8 Tabs by mouth every Monday. 96 Tab 0    triamcinolone acetonide (KENALOG) 40 mg/mL injection 2 mL by IntraMUSCular route once for 1 dose. 2 mL 0    tofacitinib (XELJANZ XR) 11 mg Tb24 Take  by mouth daily.  cetirizine (ZYRTEC) 10 mg tablet Take 1 Tab by mouth daily. 90 Tab 1    folic acid (FOLVITE) 1 mg tablet Take 1 mg by mouth daily. Indications: does not take on Mondays      Ibuprofen-diphenhydrAMINE (ADVIL PM) 200-38 mg tab Take 200 mg by mouth as needed.  sennosides (SENNA) 8.6 mg cap Take 8.6 mg by mouth as needed.           ALLERGIES    No Known Allergies    PHYSICAL EXAMINATION    Visit Vitals    /88    Pulse 80    Temp 98 °F (36.7 °C)    Resp 18    Ht 5' 2\" (1.575 m)    Wt 206 lb (93.4 kg)    BMI 37.68 kg/m2     Body mass index is 37.68 kg/(m^2). General: Patient is alert, oriented x 3, not in acute distress    HEENT:   Sclerae are not injected and appear moist.  Oral mucous membranes are moist, there are no ulcers present. There is no alopecia. Neck is supple     Cardiovascular:  Heart is regular rate and rhythm, no murmurs. Chest:  Bibasilar crackles. Extremities:  Free of clubbing, cyanosis, edema    Neurological exam:  No focal sensory deficits, muscle strength is full in upper and lower extremities     Skin exam:  There are no rashes, no alopecia, no discoid lesions, no active Raynaud's, no livedo reticularis, no periungual erythema. Musculoskeletal exam:  A comprehensive musculoskeletal exam was performed for all joints of each upper and lower extremity and assessed for swelling, tenderness and range of motion.  Positive results are documented as below:    Decreased active and passive ROM of right shoulder due to pain  Bilateral knee crepitus without effusion with hyperextension  Left MTP synovitis (1-5)  Right MTP tenderness (1-3)    Z-Deformities:   no  [place]ck Deformities:  no  Boutonierre's Deformities:  no  Ulnar Deviation:   no  MCP Subluxation:  no     Joint Count 8/6/2018 5/1/2018 3/27/2018 12/27/2017   Patient pain (0-100) 85 10 25 25   MHAQ 0.25 0 0.25 0.125   Left wrist- Tender - - 1 -   Left wrist- Swollen - 1 1 -   Left 1st MCP - Swollen - 1 1 -   Left 2nd PIP - Tender - 1 - -   Left 2nd PIP - Swollen - 1 - -   Left 3rd PIP - Tender 0 1 - -   Left 3rd PIP - Swollen 1 1 - -   Left 4th PIP - Tender - 1 - -   Left 5th PIP - Tender - 1 - -   Right elbow - Tender 1 - - 1   Right elbow - Swollen 1 1 1 1   Right wrist- Tender - 1 1 -   Right wrist- Swollen - 1 1 -   Right 1st MCP - Swollen - 1 - -   Right 3rd MCP - Swollen 1 1 1 -   Right 4th MCP - Tender - 1 - -   Right 4th MCP - Swollen 1 - 1 -   Right 2nd PIP - Swollen - - 1 -   Right 3rd PIP - Tender 1 1 - -   Right 3rd PIP - Swollen 1 1 1 -   Right 5th PIP - Tender - 1 - -   Right 5th PIP - Swollen - 1 - -   Tender Joint Count (Total) 2 8 2 1   Swollen Joint Count (Total) 5 10 8 1   Physician Assessment (0-10) 3 3 3 1   Patient Assessment (0-10) 5.5 2.5 1.5 3   CDAI Total (calculated) 15.5 23.5 14.5 6       DATA REVIEW    Laboratory     Recent laboratory results were reviewed, summarized, and discussed with the patient. Imaging    Musculoskeletal Ultrasound    Ultrasound of the right elbow. Indication: joint pain. (12/27/17)  Using a mYwindow e with 12 Mhz probe, limited views of the right elbow were reviewed. This revealed hypoechoic dopplerable collection and anechoic collection within the olecranon fossa. The tendons were normal. Bony contours were regular without erosions seen. There were no soft tissue masses noted. Impression: right elbow synovitis with effusion    Radiographs    Chest 3/27/2018: clear lungs. There is a small calcification or bone island overlying the left costophrenic angle. Heart size is normal. There is no pulmonary edema. There is no evident pneumothorax, adenopathy or effusion. Bilateral Hand 12/27/2017: mild diffuse periarticular osteopenia. The joint space widths are normal. No erosive changes are shown. There is anatomic alignment. No pathological soft tissue calcifications or demonstrated. No localized areas of soft tissue swelling is shown. Right Elbow 12/27/2017: mild diffuse osteopenia. There is a small elbow effusion. There is mild to moderate uniform joint space narrowing which is greatest between the capitellum and radial head. No fracture is shown. No erosive changes are evident. Bilateral Foot 12/27/2017: mild diffuse periarticular osteopenia. Minimal joint space widths are demonstrated bilaterally. There are no erosive changes. No pathologic soft tissue calcifications or localized areas of soft tissue swelling are demonstrated.   There is anatomic alignment. Deformity of the distal shaft of the left fifth metatarsal bone is shown suggesting remote, healed fracture. There is no evidence for acute fracture. CT Imaging    CT Chest without contrast 4/03/2018: The thyroid gland reveals no nodules. There is no axillary adenopathy. There are  no enlarged mediastinal or hilar lymph nodes. There are no pleural or  ericardial effusions. No adrenal lesions. The lungs are well aerated. The central airways are patent. There are minimal linear areas of scarring at each lung base. No pneumonia. No pulmonary edema no evidence for fibrosis. Review of bone windows reveals no destructive lesions. MR Imaging    None    DXA    None    PFT    PFT 4/03/2018: FVC of 2.32 (90%), FEV1 of 1.73 (91%), FEV1/FVC of 74.8% and a DLCO of 15.11 (>80%), TLC 3.88 (96%), VC 2.28 (81%). PATHOLOGY    Ultrasound Guided Right Elbow Kenalog 40 mg IA. (12/27/17)    Indications:   Symptom relief from Rheumatoid Arthritis flare. (08/06/18)     Procedure:   After consent was obtained, and timeout performed, using sterile technique the right gluteus was prepped using alcohol. Local anesthetic used: none. The gluteus was entered and 0 ml's of fluid was withdrawn. Kenalog 80 mg was injected into the gluteus and the needle withdrawn. The procedure was well tolerated. The patient was asked to watch for fever, or increased swelling or persistent pain in the joint. Call or return to clinic as needed if such symptoms occur or there is failure to improve as anticipated. ASSESSMENT AND PLAN    This is a follow-up visit for Ms. Roe. 1) Seropositive Rheumatoid Arthritis. She is maintained on methotrexate 20 mg every Monday and Xeljanz 11 mg XR with good tolerance. She was doing well until a flare in her left foot which improved with a prednisone taper but continues to be active.  Her CDAI was 15.5 (previously 23.5, 14.5, 6) with 2 tender and 5 swollen joints, with bilateral MTP involvement, consistent with moderate disease activity. I injected her with Kenalog 80 mg IM with good tolerance. I will continue treatment. Labs today and follow up in 3 months. 2) Long Term Use of Immunosuppressants. The patient remains on immunomodulatory medications (methotrexate, Glenetta Little) and requires frequent toxicity monitoring by blood work. Respective labs were ordered (CBC and CMP). 3) Bilateral Knee Osteoarthritis. This was not an active issue today. 4) Dyspnea. This is chronic and she attributes to allergies. She was evaluate by pulmonology in the past and was told she had scar tissue in her left lung. Exam revealed bibasilar crackles. CT Chest showed bibasilar crackles without concerning findings for interstitial lung disease. PFT showed mild small airway obstruction. She was instructed to follow up with pulmonology. 5) Vitamin D Deficiency. Her level was 32.8 (previously 21.3). She reports that ergocalciferol is too costly and has been taking OTC supplements. The patient voiced understanding of the aforementioned assessment and plan. Summary of plan was provided in the After Visit Summary patient instructions.      TODAY'S ORDERS    Orders Placed This Encounter    THER/PROPH/DIAG INJECTION, SUBCUT/IM ([id number])    CBC WITH AUTOMATED DIFF    METABOLIC PANEL, COMPREHENSIVE    C REACTIVE PROTEIN, QT    SED RATE (ESR)    TRIAMCINOLONE ACETONIDE INJ    methotrexate (RHEUMATREX) 2.5 mg tablet    triamcinolone acetonide (KENALOG) 40 mg/mL injection     Future Appointments  Date Time Provider Yolanda Neal   11/6/2018 4:00 PM Quynh Yoo MD [address]   2/8/2019 8:15 AM Michelle Hopper MD [address], [place], [address] [facility]    Adult Rheumatology   Musculoskeletal Ultrasound Certified  [facility]  [address] [facility], [address]   Phone [phone]  Fax [phone]

## 2018-08-07 LAB
ALBUMIN SERPL-MCNC: 4.1 G/DL (ref 3.5–5.5)
ALBUMIN/GLOB SERPL: 1.6 {RATIO} (ref 1.2–2.2)
ALP SERPL-CCNC: 96 IU/L (ref 39–117)
ALT SERPL-CCNC: 16 IU/L (ref 0–32)
AST SERPL-CCNC: 23 IU/L (ref 0–40)
BASOPHILS # BLD AUTO: 0 X10E3/UL (ref 0–0.2)
BASOPHILS NFR BLD AUTO: 0 %
BILIRUB SERPL-MCNC: 0.2 MG/DL (ref 0–1.2)
BUN SERPL-MCNC: 24 MG/DL (ref 6–24)
BUN/CREAT SERPL: 28 (ref 9–23)
CALCIUM SERPL-MCNC: 9.4 MG/DL (ref 8.7–10.2)
CHLORIDE SERPL-SCNC: 105 MMOL/L (ref 96–106)
CO2 SERPL-SCNC: 23 MMOL/L (ref 20–29)
CREAT SERPL-MCNC: 0.85 MG/DL (ref 0.57–1)
CRP SERPL-MCNC: 9.5 MG/L (ref 0–4.9)
EOSINOPHIL # BLD AUTO: 0.2 X10E3/UL (ref 0–0.4)
EOSINOPHIL NFR BLD AUTO: 2 %
ERYTHROCYTE [DISTWIDTH] IN BLOOD BY AUTOMATED COUNT: 16 % (ref 12.3–15.4)
ERYTHROCYTE [SEDIMENTATION RATE] IN BLOOD BY WESTERGREN METHOD: 28 MM/HR (ref 0–40)
GLOBULIN SER CALC-MCNC: 2.5 G/DL (ref 1.5–4.5)
GLUCOSE SERPL-MCNC: 79 MG/DL (ref 65–99)
HCT VFR BLD AUTO: 34.6 % (ref 34–46.6)
HGB BLD-MCNC: 10.9 G/DL (ref 11.1–15.9)
IMM GRANULOCYTES # BLD: 0 X10E3/UL (ref 0–0.1)
IMM GRANULOCYTES NFR BLD: 0 %
LYMPHOCYTES # BLD AUTO: 1.3 X10E3/UL (ref 0.7–3.1)
LYMPHOCYTES NFR BLD AUTO: 16 %
MCH RBC QN AUTO: 29.9 PG (ref 26.6–33)
MCHC RBC AUTO-ENTMCNC: 31.5 G/DL (ref 31.5–35.7)
MCV RBC AUTO: 95 FL (ref 79–97)
MONOCYTES # BLD AUTO: 0.9 X10E3/UL (ref 0.1–0.9)
MONOCYTES NFR BLD AUTO: 11 %
NEUTROPHILS # BLD AUTO: 5.9 X10E3/UL (ref 1.4–7)
NEUTROPHILS NFR BLD AUTO: 71 %
PLATELET # BLD AUTO: 291 X10E3/UL (ref 150–379)
POTASSIUM SERPL-SCNC: 5 MMOL/L (ref 3.5–5.2)
PROT SERPL-MCNC: 6.6 G/DL (ref 6–8.5)
RBC # BLD AUTO: 3.64 X10E6/UL (ref 3.77–5.28)
SODIUM SERPL-SCNC: 141 MMOL/L (ref 134–144)
WBC # BLD AUTO: 8.3 X10E3/UL (ref 3.4–10.8)

## 2018-08-18 DIAGNOSIS — Z79.60 LONG-TERM USE OF IMMUNOSUPPRESSANT MEDICATION: ICD-10-CM

## 2018-08-18 DIAGNOSIS — M05.79 SEROPOSITIVE RHEUMATOID ARTHRITIS OF MULTIPLE SITES (HCC): ICD-10-CM

## 2018-08-20 RX ORDER — CETIRIZINE HCL 10 MG
10 TABLET ORAL DAILY
Qty: 90 TAB | Refills: 1 | Status: SHIPPED | OUTPATIENT
Start: 2018-08-20 | End: 2019-02-08 | Stop reason: SDUPTHER

## 2018-08-20 RX ORDER — METHOTREXATE 2.5 MG/1
20 TABLET ORAL
Qty: 96 TAB | Refills: 0 | Status: SHIPPED | OUTPATIENT
Start: 2018-08-20 | End: 2018-11-06 | Stop reason: SDUPTHER

## 2018-08-20 NOTE — TELEPHONE ENCOUNTER
From: George Roe  To:  Steven Boggs MD  Sent: 8/18/2018 11:34 AM EDT  Subject: Medication Renewal Request    Original authorizing provider: Steven Boggs MD    [place] [id number] would like a refill of the following medications:  cetirizine (ZYRTEC) 10 mg tablet Steven Boggs MD]    Preferred pharmacy: [address] [place] [address]    Comment:      Medication renewals requested in this message routed to other providers:  methotrexate (RHEUMATREX) 2.5 mg tablet Cynthia Ornelas MD]

## 2018-08-20 NOTE — TELEPHONE ENCOUNTER
From: Denia Roe  To: Javi Wellington MD  Sent: 8/18/2018 11:34 AM EDT  Subject: Medication Renewal Request    Original authorizing provider: Javi Wellington MD    [place] [id number] would like a refill of the following medications:  methotrexate (RHEUMATREX) 2.5 mg tablet Javi Wellington MD]    Preferred pharmacy: [address] [place] [address]    Comment:      Medication renewals requested in this message routed to other providers:  cetirizine (ZYRTEC) 10 mg tablet Kathy Mcgrath MD]

## 2018-11-06 ENCOUNTER — OFFICE VISIT (OUTPATIENT)
Dept: RHEUMATOLOGY | Age: 58
End: 2018-11-06

## 2018-11-06 VITALS
WEIGHT: 211 LBS | HEART RATE: 80 BPM | SYSTOLIC BLOOD PRESSURE: 136 MMHG | BODY MASS INDEX: 38.83 KG/M2 | TEMPERATURE: 97.9 F | DIASTOLIC BLOOD PRESSURE: 87 MMHG | RESPIRATION RATE: 18 BRPM | HEIGHT: 62 IN

## 2018-11-06 DIAGNOSIS — Z79.60 LONG-TERM USE OF IMMUNOSUPPRESSANT MEDICATION: ICD-10-CM

## 2018-11-06 DIAGNOSIS — M05.79 SEROPOSITIVE RHEUMATOID ARTHRITIS OF MULTIPLE SITES (HCC): Primary | ICD-10-CM

## 2018-11-06 DIAGNOSIS — E55.9 VITAMIN D DEFICIENCY: ICD-10-CM

## 2018-11-06 DIAGNOSIS — R06.00 DYSPNEA, UNSPECIFIED TYPE: ICD-10-CM

## 2018-11-06 RX ORDER — POLYETHYLENE GLYCOL 3350 17 G/17G
17 POWDER, FOR SOLUTION ORAL AS NEEDED
COMMUNITY
End: 2018-11-18 | Stop reason: SDUPTHER

## 2018-11-06 RX ORDER — METHOTREXATE 2.5 MG/1
20 TABLET ORAL
Qty: 96 TAB | Refills: 0 | Status: SHIPPED | OUTPATIENT
Start: 2018-11-12 | End: 2019-02-20 | Stop reason: SDUPTHER

## 2018-11-06 NOTE — PATIENT INSTRUCTIONS
Shoulder Arthritis: Exercises Your Care Instructions Here are some examples of typical rehabilitation exercises for your condition. Start each exercise slowly. Ease off the exercise if you start to have pain. Your doctor or physical therapist will tell you when you can start these exercises and which ones will work best for you. How to do the exercises Shoulder flexion (lying down) 1. Lie on your back, holding a wand with both hands. Your palms should face down as you hold the wand. 2. Keeping your elbows straight, slowly raise your arms over your head. Raise them until you feel a stretch in your shoulders, upper back, and chest. 
3. Hold for 15 to 30 seconds. 4. Repeat 2 to 4 times. Shoulder rotation (lying down) 1. Lie on your back. Hold a wand with both hands with your elbows bent and palms up. 2. Keep your elbows close to your body, and move the wand across your body toward the sore arm. 3. Hold for 8 to 12 seconds. 4. Repeat 2 to 4 times. Shoulder internal rotation with towel 1. Hold a towel above and behind your head with the arm that is not sore. 2. With your sore arm, reach behind your back and grasp the towel. 3. With the arm above your head, pull the towel upward. Do this until you feel a stretch on the front and outside of your sore shoulder. 4. Hold 15 to 30 seconds. 5. Repeat 2 to 4 times. Shoulder blade squeeze 1. Stand with your arms at your sides, and squeeze your shoulder blades together. Do not raise your shoulders up as you squeeze. 2. Hold 6 seconds. 3. Repeat 8 to 12 times. Resisted rows 1. Put the band around a solid object at about waist level. (A bedpost will work well.) Each hand should hold an end of the band. 2. With your elbows at your sides and bent to 90 degrees, pull the band back. Your shoulder blades should move toward each other. Return to the starting position. 3. Repeat 8 to 12 times. External rotator strengthening exercise 1. Start by tying a piece of elastic exercise material to a doorknob. You can use surgical tubing or Thera-Band. (You may also hold one end of the band in each hand.) 2. Stand or sit with your shoulder relaxed and your elbow bent 90 degrees. Your upper arm should rest comfortably against your side. Squeeze a rolled towel between your elbow and your body for comfort. This will help keep your arm at your side. 3. Hold one end of the elastic band with the hand of the painful arm. 4. Start with your forearm across your belly. Slowly rotate the forearm out away from your body. Keep your elbow and upper arm tucked against the towel roll or the side of your body until you begin to feel tightness in your shoulder. Slowly move your arm back to where you started. 5. Repeat 8 to 12 times. Internal rotator strengthening exercise 1. Start by tying a piece of elastic exercise material to a doorknob. You can use surgical tubing or Thera-Band. 2. Stand or sit with your shoulder relaxed and your elbow bent 90 degrees. Your upper arm should rest comfortably against your side. Squeeze a rolled towel between your elbow and your body for comfort. This will help keep your arm at your side. 3. Hold one end of the elastic band in the hand of the painful arm. 4. Slowly rotate your forearm toward your body until it touches your belly. Slowly move it back to where you started. 5. Keep your elbow and upper arm firmly tucked against the towel roll or at your side. 6. Repeat 8 to 12 times. Pendulum swing 1. Hold on to a table or the back of a chair with your good arm. Then bend forward a little and let your sore arm hang straight down. This exercise does not use the arm muscles. Rather, use your legs and your hips to create movement that makes your arm swing freely. 2. Use the movement from your hips and legs to guide the slightly swinging arm back and forth like a pendulum (or elephant trunk).  Then guide it in circles that start small (about the size of a dinner plate). Make the circles a bit larger each day, as your pain allows. 3. Do this exercise for 5 minutes, 5 to 7 times each day. 4. As you have less pain, try bending over a little farther to do this exercise. This will increase the amount of movement at your shoulder. Follow-up care is a key part of your treatment and safety. Be sure to make and go to all appointments, and call your doctor if you are having problems. It's also a good idea to know your test results and keep a list of the medicines you take. Where can you learn more? Go to http://saw-fifi.info/. Enter H562 in the search box to learn more about \"Shoulder Arthritis: Exercises. \" Current as of: November 29, 2017 Content Version: 11.8 © 0705-6345 [employer]. Care instructions adapted under license by [employer] (which disclaims liability or warranty for this information). If you have questions about a medical condition or this instruction, always ask your healthcare professional. [address] any warranty or liability for your use of this information.

## 2018-11-06 NOTE — PROGRESS NOTES
REASON FOR VISIT This is a follow-up visit for Ms. Roe for Seropositive Rheumatoid Arthritis. Inflammatory arthritis phenotype includes: Anti-CCP positive: yes (>250) Rheumatoid factor positive: yes (51.6) Erosive disease: no 
Extra-articular manifestations include: bibasilar scarring Immunosuppression Screening (12/27/2017): Quantiferon TB: indeterminate PPD:  Negative (1/10/2018) Hepatitis B: negative Hepatitis C: negative Therapy History includes: 
Current DMARD therapy includes: methotrexate 20 mg every Monday, Xeljanz 11 mg XR (3/27/2018 to present) Prior DMARD therapy includes: Enbrel (6 weeks) The following DMARDs have been ineffective: none The following DMARDs were stopped because of side effects: Enbrel (injection site reaction, increased cough, other unknown) Immunizations:  
Immunization History Administered Date(s) Administered  TB Skin Test (PPD) Intradermal 01/08/2018, 01/08/2018 Active problems include: 
 
Patient Active Problem List  
Diagnosis Code  Seropositive rheumatoid arthritis of multiple sites (HCC) M05.79  
 Anemia D64.9  Long-term use of immunosuppressant medication Z79.899  Primary osteoarthritis of both knees M17.0  Vitamin D deficiency E55.9  Severe obesity (BMI 35.0-39. 9) E66.01  
 
HISTORY OF PRESENT ILLNESS Ms. Abdullahi Saxena returns for a follow-up. On her last visit, I continued methotrexate 20 mg every Monday and Xeljanz 11 mg XR with good tolerance and injected her with Kenalog 80 mg IM. Today, she feels better. She can walk better. She denies pain, swelling, or stiffness in her feet or elbows. She has intermittent sharp pain in her right shoulder. There is no stiffness. She feels that it may be from sleeping on it. She feels at night, when she sleeps, she develops cramping in her legs. She is urinating a lot without pain.   
 
She denies fever, weight loss, blurred vision, vision loss, oral ulcers, ankle swelling, dry cough, dyspnea, nausea, vomiting, dysphagia, abdominal pain, black or bloody stool, fall since last visit, rash, easy bruising and increased thirst. 
 
Ms. Tamiko Pickard has continued her medications for arthritis and reports good tolerance without significant side effects. Last toxicity monitoring by blood work was done on 8/06/2018 and did not reveal any significant adverse effects. Most recent inflammatory markers from 8/06/2018 revealed a ESR 28 mm/hr (previously 28, 29, 41 mm/hr) and CRP 9.5 mg/L (previously 4.8, 8.0, 9.5 mg/L). The patient has not had any interval hospital admissions, infections, or surgeries. REVIEW OF SYSTEMS A comprehensive review of systems was performed and pertinent results are documented in the HPI, review of systems is otherwise non-contributory. PAST MEDICAL HISTORY She has a past medical history of Anemia, Reactive airway disease, and Rheumatoid arthritis ([address] FAMILY HISTORY Her family history includes Heart Disease in her father; High Cholesterol in her mother; Thyroid Disease in her mother. SOCIAL HISTORY She reports that she quit smoking about 6 years ago. she has never used smokeless tobacco. She reports that she does not drink alcohol or use drugs. MEDICATIONS Current Outpatient Medications Medication Sig Dispense Refill  polyethylene glycol (MIRALAX) 17 gram/dose powder Take 17 g by mouth as needed.  [START ON 11/12/2018] methotrexate (RHEUMATREX) 2.5 mg tablet Take 8 Tabs by mouth every Monday. 96 Tab 0  
 cetirizine (ZYRTEC) 10 mg tablet Take 1 Tab by mouth daily. 90 Tab 1  
 tofacitinib (XELJANZ XR) 11 mg Tb24 Take  by mouth daily.  folic acid (FOLVITE) 1 mg tablet Take 1 mg by mouth daily. Indications: does not take on Mondays  Ibuprofen-diphenhydrAMINE (ADVIL PM) 200-38 mg tab Take 200 mg by mouth as needed. ALLERGIES No Known Allergies PHYSICAL EXAMINATION Visit Vitals /87 Pulse 80 Temp 97.9 °F (36.6 °C) Resp 18 Ht 5' 2\" (1.575 m) Wt 211 lb (95.7 kg) BMI 38.59 kg/m² Body mass index is 38.59 kg/m². General: Patient is alert, oriented x 3, not in acute distress HEENT:  
Sclerae are not injected and appear moist. 
There is no alopecia. Neck is supple Cardiovascular: 
Heart is regular rate and rhythm, no murmurs. Chest: 
Bibasilar crackles. Extremities: 
Free of clubbing, cyanosis, edema Neurological exam: Muscle strength is full in upper and lower extremities Skin exam: 
There are no rashes, no alopecia, no discoid lesions, no active Raynaud's, no livedo reticularis, no periungual erythema. Musculoskeletal exam: A comprehensive musculoskeletal exam was performed for all joints of each upper and lower extremity and assessed for swelling, tenderness and range of motion. Positive results are documented as below: 
 
Decreased active and passive ROM of right shoulder due to pain (RESOLVED) Bilateral knee crepitus without effusion with hyperextension Z-Deformities:   no 
[place] Neck Deformities:  no 
Boutonierre's Deformities:  no 
Ulnar Deviation:   no 
MCP Subluxation:  no 
  
Joint Count 11/6/2018 8/6/2018 5/1/2018 3/27/2018 12/27/2017 Patient pain (0-100) 10 85 10 25 25 MHAQ 0 0.25 0 0.25 0.125 Left wrist- Tender - - - 1 - Left wrist- Swollen - - 1 1 - Left 1st MCP - Swollen - - 1 1 - Left 2nd PIP - Tender - - 1 - - Left 2nd PIP - Swollen - - 1 - - Left 3rd PIP - Tender - 0 1 - - Left 3rd PIP - Swollen - 1 1 - - Left 4th PIP - Tender - - 1 - - Left 5th PIP - Tender - - 1 - - Right shoulder - Tender 1 - - - - Right shoulder - Swollen 0 - - - - Right elbow - Tender - 1 - - 1 Right elbow - Swollen - 1 1 1 1 Right wrist- Tender - - 1 1 - Right wrist- Swollen - - 1 1 - Right 1st MCP - Swollen - - 1 - - Right 3rd MCP - Swollen - 1 1 1 - Right 4th MCP - Tender - - 1 - -  
 Right 4th MCP - Swollen - 1 - 1 - Right 2nd PIP - Swollen - - - 1 - Right 3rd PIP - Tender - 1 1 - - Right 3rd PIP - Swollen - 1 1 1 - Right 5th PIP - Tender - - 1 - - Right 5th PIP - Swollen - - 1 - - Tender Joint Count (Total) 1 2 8 2 1 Swollen Joint Count (Total) 0 5 10 8 1 Physician Assessment (0-10) - 3 3 3 1 Patient Assessment (0-10) 1 5.5 2.5 1.5 3 CDAI Total (calculated) - 15.5 23.5 14.5 6 DATA REVIEW Laboratory Recent laboratory results were reviewed, summarized, and discussed with the patient. Imaging Musculoskeletal Ultrasound Ultrasound of the right elbow. Indication: joint pain. (12/27/17) Using a [employer] e with 12 Mhz probe, limited views of the right elbow were reviewed. This revealed hypoechoic dopplerable collection and anechoic collection within the olecranon fossa. The tendons were normal. Bony contours were regular without erosions seen. There were no soft tissue masses noted. Impression: right elbow synovitis with effusion Radiographs Chest 3/27/2018: clear lungs. There is a small calcification or bone island overlying the left costophrenic angle. Heart size is normal. There is no pulmonary edema. There is no evident pneumothorax, adenopathy or effusion. Bilateral Hand 12/27/2017: mild diffuse periarticular osteopenia. The joint space widths are normal. No erosive changes are shown. There is anatomic alignment. No pathological soft tissue calcifications or demonstrated. No localized areas of soft tissue swelling is shown. Right Elbow 12/27/2017: mild diffuse osteopenia. There is a small elbow effusion. There is mild to moderate uniform joint space narrowing which is greatest between the capitellum and radial head. No fracture is shown. No erosive changes are evident. Bilateral Foot 12/27/2017: mild diffuse periarticular osteopenia. Minimal joint space widths are demonstrated bilaterally.  There are no erosive changes. No pathologic soft tissue calcifications or localized areas of soft tissue swelling are demonstrated. There is anatomic alignment. Deformity of the distal shaft of the left fifth metatarsal bone is shown suggesting remote, healed fracture. There is no evidence for acute fracture. CT Imaging CT Chest without contrast 4/03/2018: The thyroid gland reveals no nodules. There is no axillary adenopathy. There are 
no enlarged mediastinal or hilar lymph nodes. There are no pleural or  ericardial effusions. No adrenal lesions. The lungs are well aerated. The central airways are patent. There are minimal linear areas of scarring at each lung base. No pneumonia. No pulmonary edema no evidence for fibrosis. Review of bone windows reveals no destructive lesions. MR Imaging None DXA None PFT 
 
PFT 4/03/2018: FVC of 2.32 (90%), FEV1 of 1.73 (91%), FEV1/FVC of 74.8% and a DLCO of 15.11 (>80%), TLC 3.88 (96%), VC 2.28 (81%). PATHOLOGY Kenalog 80 mg IM. (08/06/18) Ultrasound Guided Right Elbow Kenalog 40 mg IA. (12/27/17) ASSESSMENT AND PLAN This is a follow-up visit for Ms. Roe. 1) Seropositive Rheumatoid Arthritis. She is maintained on methotrexate 20 mg every Monday and Xeljanz 11 mg XR with good tolerance. She feels well. Her CDAI was 2 (previously 15.5, 23.5, 14.5, 6) with 1 tender and 0 swollen joints, consistent with remission. I will continue treatment. Labs today and in 3 months. Follow up in 6 months. 2) Long Term Use of Immunosuppressants. The patient remains on immunomodulatory medications (methotrexate, Eunice King) and requires frequent toxicity monitoring by blood work. Respective labs were ordered (CBC and CMP). 3) Bilateral Knee Osteoarthritis. This was not an active issue today. 4) Dyspnea. This is chronic and she attributes to allergies.  She was evaluated by pulmonology in the past and was told she had scar tissue in her left lung. Exam revealed bibasilar crackles. CT Chest showed bibasilar crackles without concerning findings for interstitial lung disease. PFT showed mild small airway obstruction. She was instructed to follow up with pulmonology. This is not an active issue today. 5) Vitamin D Deficiency. Her level was 32.8 (previously 21.3). She reports that ergocalciferol is too costly and has been taking OTC supplements. The patient voiced understanding of the aforementioned assessment and plan. Summary of plan was provided in the After Visit Summary patient instructions. TODAY'S ORDERS Orders Placed This Encounter  QUANTIFERON-TB GOLD PLUS  
 CBC WITH AUTOMATED DIFF  
 METABOLIC PANEL, COMPREHENSIVE  C REACTIVE PROTEIN, QT  
 SED RATE (ESR)  CBC WITH AUTOMATED DIFF  
 METABOLIC PANEL, COMPREHENSIVE  C REACTIVE PROTEIN, QT  
 SED RATE (ESR)  CHRONIC HEPATITIS PANEL  
 methotrexate (RHEUMATREX) 2.5 mg tablet Future Appointments Date Time Provider Yolanda Val 2/8/2019  8:15 AM Nan Dunaway MD [facility] LAURA Hair MD, [facility] Adult Rheumatology Rheumatology Ultrasound Certified Erick Nathan A Part of [employer] [facility], [address] [place] [address] Phone [phone] Fax [phone]

## 2018-11-07 LAB
ALBUMIN SERPL-MCNC: 4.5 G/DL (ref 3.5–5.5)
ALBUMIN/GLOB SERPL: 1.7 {RATIO} (ref 1.2–2.2)
ALP SERPL-CCNC: 116 IU/L (ref 39–117)
ALT SERPL-CCNC: 16 IU/L (ref 0–32)
AST SERPL-CCNC: 19 IU/L (ref 0–40)
BASOPHILS # BLD AUTO: 0 X10E3/UL (ref 0–0.2)
BASOPHILS NFR BLD AUTO: 0 %
BILIRUB SERPL-MCNC: 0.3 MG/DL (ref 0–1.2)
BUN SERPL-MCNC: 25 MG/DL (ref 6–24)
BUN/CREAT SERPL: 27 (ref 9–23)
CALCIUM SERPL-MCNC: 9.4 MG/DL (ref 8.7–10.2)
CHLORIDE SERPL-SCNC: 100 MMOL/L (ref 96–106)
CO2 SERPL-SCNC: 25 MMOL/L (ref 20–29)
COMMENT, 144067: NORMAL
CREAT SERPL-MCNC: 0.91 MG/DL (ref 0.57–1)
CRP SERPL-MCNC: 7.2 MG/L (ref 0–4.9)
EOSINOPHIL # BLD AUTO: 0.1 X10E3/UL (ref 0–0.4)
EOSINOPHIL NFR BLD AUTO: 1 %
ERYTHROCYTE [DISTWIDTH] IN BLOOD BY AUTOMATED COUNT: 15.6 % (ref 12.3–15.4)
ERYTHROCYTE [SEDIMENTATION RATE] IN BLOOD BY WESTERGREN METHOD: 77 MM/HR (ref 0–40)
GLOBULIN SER CALC-MCNC: 2.7 G/DL (ref 1.5–4.5)
GLUCOSE SERPL-MCNC: 84 MG/DL (ref 65–99)
HBV CORE AB SERPL QL IA: NEGATIVE
HBV CORE IGM SERPL QL IA: NEGATIVE
HBV E AB SERPL QL IA: NEGATIVE
HBV E AG SERPL QL IA: NEGATIVE
HBV SURFACE AB SER QL: NON REACTIVE
HBV SURFACE AG SERPL QL IA: NEGATIVE
HCT VFR BLD AUTO: 37.1 % (ref 34–46.6)
HCV AB S/CO SERPL IA: <0.1 S/CO RATIO (ref 0–0.9)
HGB BLD-MCNC: 11.7 G/DL (ref 11.1–15.9)
IMM GRANULOCYTES # BLD: 0 X10E3/UL (ref 0–0.1)
IMM GRANULOCYTES NFR BLD: 0 %
LYMPHOCYTES # BLD AUTO: 1.3 X10E3/UL (ref 0.7–3.1)
LYMPHOCYTES NFR BLD AUTO: 15 %
MCH RBC QN AUTO: 31 PG (ref 26.6–33)
MCHC RBC AUTO-ENTMCNC: 31.5 G/DL (ref 31.5–35.7)
MCV RBC AUTO: 98 FL (ref 79–97)
MONOCYTES # BLD AUTO: 0.7 X10E3/UL (ref 0.1–0.9)
MONOCYTES NFR BLD AUTO: 8 %
NEUTROPHILS # BLD AUTO: 6.6 X10E3/UL (ref 1.4–7)
NEUTROPHILS NFR BLD AUTO: 76 %
PLATELET # BLD AUTO: 399 X10E3/UL (ref 150–379)
POTASSIUM SERPL-SCNC: 4.7 MMOL/L (ref 3.5–5.2)
PROT SERPL-MCNC: 7.2 G/DL (ref 6–8.5)
RBC # BLD AUTO: 3.78 X10E6/UL (ref 3.77–5.28)
SODIUM SERPL-SCNC: 140 MMOL/L (ref 134–144)
WBC # BLD AUTO: 8.7 X10E3/UL (ref 3.4–10.8)

## 2018-11-09 LAB
GAMMA INTERFERON BACKGROUND BLD IA-ACNC: 0.02 IU/ML
M TB IFN-G BLD-IMP: ABNORMAL
M TB IFN-G CD4+ BCKGRND COR BLD-ACNC: 0.03 IU/ML
MITOGEN IGNF BLD-ACNC: 0.52 IU/ML
QUANTIFERON INCUBATION, QF1T: ABNORMAL
QUANTIFERON TB2 AG: 0.02 IU/ML
SERVICE CMNT-IMP: ABNORMAL

## 2018-11-15 ENCOUNTER — PATIENT MESSAGE (OUTPATIENT)
Dept: FAMILY MEDICINE CLINIC | Age: 58
End: 2018-11-15

## 2018-11-18 RX ORDER — POLYETHYLENE GLYCOL 3350 17 G/17G
17 POWDER, FOR SOLUTION ORAL AS NEEDED
Qty: 289 G | Refills: 0 | Status: SHIPPED | OUTPATIENT
Start: 2018-11-18 | End: 2019-07-22 | Stop reason: SDUPTHER

## 2018-11-19 NOTE — TELEPHONE ENCOUNTER
From: Yari Stock  Sent: 11/18/2018 3:52 PM EST  To: Chencho Morrison MD  Subject: RE: Prescription Question    ----- Message from [address] [place] [address] 951, [facility] sent at 11/18/2018 3:52 PM EST -----    Dr Raheem Dyer,     Can you send me prescriptions for both    Thank you  ----- Message -----  From: Chencho Morrison MD  Sent: 11/16/2018 12:48 PM EST  To: Arcadio HoffmankelsiJerson  Subject: RE: Prescription Question  Hi Ms. Roe,    I do not mind sending in a prescription. Just to clarify, are you taking Metamucil or MiraLAX? Metamucil as a fiber supplement that would be safe to take every day to help with constipation. MiraLAX is a laxative to use only when needed for constipation and not meant for every day use. Can you let me know which one you were requesting? Thanks,  Dex Dyer M.D.      ----- Message -----   From: Arcadio Roe   Sent: 11/15/2018 1:53 PM EST   To: Chencho Morrison MD  Subject: Prescription Question    Dr. Raheem Dyer    After my colonoscopy last year I was advised to start taking miralax. Michelle been trying to keep up with it but the over the counter cost is getting to pricey. Would you be able to write me a prescription instead.     Thank you for your assistance    Yari Stock

## 2018-12-24 ENCOUNTER — DOCUMENTATION ONLY (OUTPATIENT)
Dept: RHEUMATOLOGY | Age: 58
End: 2018-12-24

## 2019-02-08 ENCOUNTER — OFFICE VISIT (OUTPATIENT)
Dept: FAMILY MEDICINE CLINIC | Age: 59
End: 2019-02-08

## 2019-02-08 VITALS
RESPIRATION RATE: 18 BRPM | TEMPERATURE: 97.9 F | WEIGHT: 217.6 LBS | HEIGHT: 62 IN | DIASTOLIC BLOOD PRESSURE: 82 MMHG | HEART RATE: 78 BPM | BODY MASS INDEX: 40.04 KG/M2 | OXYGEN SATURATION: 98 % | SYSTOLIC BLOOD PRESSURE: 122 MMHG

## 2019-02-08 DIAGNOSIS — M05.79 SEROPOSITIVE RHEUMATOID ARTHRITIS OF MULTIPLE SITES (HCC): ICD-10-CM

## 2019-02-08 DIAGNOSIS — E78.5 HYPERLIPIDEMIA, UNSPECIFIED HYPERLIPIDEMIA TYPE: ICD-10-CM

## 2019-02-08 DIAGNOSIS — Z00.00 ENCOUNTER FOR MEDICARE ANNUAL WELLNESS EXAM: Primary | ICD-10-CM

## 2019-02-08 DIAGNOSIS — Z79.60 LONG-TERM USE OF IMMUNOSUPPRESSANT MEDICATION: ICD-10-CM

## 2019-02-08 RX ORDER — CETIRIZINE HCL 10 MG
10 TABLET ORAL DAILY
Qty: 90 TAB | Refills: 3 | Status: SHIPPED | OUTPATIENT
Start: 2019-02-08 | End: 2019-08-04 | Stop reason: SDUPTHER

## 2019-02-08 NOTE — PATIENT INSTRUCTIONS

## 2019-02-08 NOTE — ASSESSMENT & PLAN NOTE
This condition is managed by Specialist. 
Lab Results Component Value Date/Time  WBC 8.7 11/06/2018 04:55 PM  
 HGB 11.7 11/06/2018 04:55 PM  
 HCT 37.1 11/06/2018 04:55 PM  
 PLATELET 513 32/18/9228 04:55 PM  
 Creatinine 0.91 11/06/2018 04:55 PM  
 BUN 25 11/06/2018 04:55 PM  
 Potassium 4.7 11/06/2018 04:55 PM

## 2019-02-08 NOTE — PROGRESS NOTES
This is the Subsequent Medicare Annual Wellness Exam, performed 12 months or more after the Initial AWV or the last Subsequent AWV I have reviewed the patient's medical history in detail and updated the computerized patient record. History Past Medical History:  
Diagnosis Date  Anemia  Reactive airway disease  Rheumatoid arthritis ([facility] [address] 10/24/2017 Past Surgical History:  
Procedure Laterality Date  COLONOSCOPY N/A 1/15/2018 COLONOSCOPY performed by Karma Osei. Dick Mason MD at [address] [place] [address] Sw ENDOSCOPY  
 HX OTHER SURGICAL    
 pins placed in left wrist   
 
Current Outpatient Medications Medication Sig Dispense Refill  varicella-zoster recombinant, PF, (SHINGRIX, PF,) 50 mcg/0.5 mL susr injection 0.5 mL by IntraMUSCular route once for 1 dose. Please fax vaccination documentation to [facility] at [phone], Attn: Dr. Javier Villanueva 0.5 mL 0  
 cetirizine (ZYRTEC) 10 mg tablet Take 1 Tab by mouth daily. 90 Tab 3  psyllium (METAMUCIL) powd Take 3.3 g by mouth daily as needed. 595 g 1  
 polyethylene glycol (MIRALAX) 17 gram/dose powder Take 17 g by mouth as needed. Prn constipation 289 g 0  
 methotrexate (RHEUMATREX) 2.5 mg tablet Take 8 Tabs by mouth every Monday. 96 Tab 0  
 tofacitinib (XELJANZ XR) 11 mg Tb24 Take  by mouth daily.  folic acid (FOLVITE) 1 mg tablet Take 1 mg by mouth daily. Indications: does not take on   Ibuprofen-diphenhydrAMINE (ADVIL PM) 200-38 mg tab Take 200 mg by mouth as needed. No Known Allergies Family History Problem Relation Age of Onset  Thyroid Disease Mother  High Cholesterol Mother  Heart Disease Father Social History Tobacco Use  Smoking status: Former Smoker Last attempt to quit:  Years since quittin.1  Smokeless tobacco: Never Used Substance Use Topics  Alcohol use: No  
 
Patient Active Problem List  
Diagnosis Code  Seropositive rheumatoid arthritis of multiple sites ([facility]) M05.79  
 Anemia D64.9  Long-term use of immunosuppressant medication Z79.899  Primary osteoarthritis of both knees M17.0  Vitamin D deficiency E55.9  Severe obesity (BMI 35.0-39. 9) E66.01 Depression Risk Factor Screening: PHQ over the last two weeks 11/6/2018 Little interest or pleasure in doing things Not at all Feeling down, depressed, irritable, or hopeless Not at all Total Score PHQ 2 0 Alcohol Risk Factor Screening: You do not drink alcohol or very rarely. Functional Ability and Level of Safety:  
Hearing Loss Hearing is good. Activities of Daily Living The home contains: no safety equipment. Patient does total self care Fall Risk Fall Risk Assessment, last 12 mths 10/24/2017 Able to walk? Yes Fall in past 12 months? No  
 
 
Abuse Screen Patient is not abused Cognitive Screening Evaluation of Cognitive Function: 
Has your family/caregiver stated any concerns about your memory: no 
 
Patient Care Team  
Patient Care Team: 
Fredrick Anderson MD as PCP - [facility]) Veronica Sloan MD as Physician (Rheumatology) Michel Jaime MD as Physician (Gastroenterology) Adarsh Casanova MD as Physician (Rheumatology) Assessment/Plan Education and counseling provided: 
Are appropriate based on today's review and evaluation Diagnoses and all orders for this visit: 
 
1. Hyperlipidemia, unspecified hyperlipidemia type -     LIPID PANEL 2. Seropositive rheumatoid arthritis of multiple sites ([facility] [address] Assessment & Plan: This condition is managed by Specialist. 
Lab Results Component Value Date/Time WBC 8.7 11/06/2018 04:55 PM  
 HGB 11.7 11/06/2018 04:55 PM  
 HCT 37.1 11/06/2018 04:55 PM  
 PLATELET 041 19/16/6090 04:55 PM  
 Creatinine 0.91 11/06/2018 04:55 PM  
 BUN 25 11/06/2018 04:55 PM  
 Potassium 4.7 11/06/2018 04:55 PM  
 
 
 
Other orders -     varicella-zoster recombinant, PF, (SHINGRIX, PF,) 50 mcg/0.5 mL susr injection; 0.5 mL by IntraMUSCular route once for 1 dose. Please fax vaccination documentation to [facility] at [phone], Attn: Dr. Bossman Muller 
-     cetirizine (ZYRTEC) 10 mg tablet; Take 1 Tab by mouth daily. -     psyllium (METAMUCIL) powd; Take 3.3 g by mouth daily as needed. Health Maintenance Due Topic Date Due  
 DTaP/Tdap/Td series (1 - Tdap) 09/03/1981  Shingrix Vaccine Age 50> (1 of 2) 09/03/2010  MEDICARE YEARLY EXAM  10/14/2018

## 2019-02-08 NOTE — PROGRESS NOTES
Chief Complaint Patient presents with  Hypertension  
  follow up 1. Have you been to the ER, urgent care clinic since your last visit? Hospitalized since your last visit? No 
 
2. Have you seen or consulted any other health care providers outside of the [address] [place] [address] since your last visit? Include any pap smears or colon screening.  No

## 2019-02-08 NOTE — PROGRESS NOTES
Patient Name: Lexie Farrar MRN: [id number] SUBJECTIVE Lexie Farrar is a 62 y.o. female who presents with the following:  
Here for hyperlipidemia check. Not currently on a statin due to low invasive ASCVD risk. Admits that she has not been more sedentary lately which is causing more rheumatoid arthritis flareups. She used to go to the gym but has not motivate herself to return. Plans to do so. Wt Readings from Last 3 Encounters:  
02/08/19 217 lb 9.6 oz (98.7 kg) 11/06/18 211 lb (95.7 kg) 08/06/18 206 lb (93.4 kg) Lab Results Component Value Date/Time Cholesterol, total 224 (H) 05/01/2018 11:15 AM  
 HDL Cholesterol 65 05/01/2018 11:15 AM  
 LDL, calculated 138 (H) 05/01/2018 11:15 AM  
 VLDL, calculated 21 05/01/2018 11:15 AM  
 Triglyceride 106 05/01/2018 11:15 AM  
 
 
Review of Systems Constitutional: Negative for fever, malaise/fatigue and weight loss. Respiratory: Negative for cough, hemoptysis, shortness of breath and wheezing. Cardiovascular: Negative for chest pain, palpitations, leg swelling and PND. Gastrointestinal: Negative for abdominal pain, constipation, diarrhea, nausea and vomiting. The patient's medications, allergies, past medical history, surgical history, family history and social history were reviewed and updated where appropriate. Prior to Admission medications Medication Sig Start Date End Date Taking? Authorizing Provider  
polyethylene glycol (MIRALAX) 17 gram/dose powder Take 17 g by mouth as needed. Prn constipation 11/18/18  Yes Jenna Stephens MD  
methotrexate (RHEUMATREX) 2.5 mg tablet Take 8 Tabs by mouth every Monday. 11/12/18  Yes Daxa Barry MD  
cetirizine (ZYRTEC) 10 mg tablet Take 1 Tab by mouth daily. 8/20/18  Yes Jenna Stephens MD  
tofacitinib (XELJANZ XR) 11 mg Tb24 Take  by mouth daily. Yes Provider, Historical  
folic acid (FOLVITE) 1 mg tablet Take 1 mg by mouth daily.  Indications: does not take on Mondays   Yes Provider, Historical  
Ibuprofen-diphenhydrAMINE (ADVIL PM) 200-38 mg tab Take 200 mg by mouth as needed. Yes Provider, Historical  
 
 
No Known Allergies OBJECTIVE Visit Vitals /82 (BP 1 Location: Left arm, BP Patient Position: Sitting) Pulse 78 Temp 97.9 °F (36.6 °C) (Oral) Resp 18 Ht 5' 2\" (1.575 m) Wt 217 lb 9.6 oz (98.7 kg) SpO2 98% BMI 39.80 kg/m² Physical Exam  
Constitutional: She is oriented to person, place, and time and well-developed, well-nourished, and in no distress. No distress. Eyes: Conjunctivae and EOM are normal. Pupils are equal, round, and reactive to light. Cardiovascular: Normal rate, regular rhythm and normal heart sounds. Exam reveals no gallop and no friction rub. No murmur heard. Pulmonary/Chest: Effort normal and breath sounds normal. No respiratory distress. She has no wheezes. Neurological: She is alert and oriented to person, place, and time. Skin: Skin is warm and dry. No rash noted. She is not diaphoretic. Psychiatric: Mood, memory, affect and judgment normal.  
Nursing note and vitals reviewed. ASSESSMENT AND PLAN Lexie Farrar is a 62 y.o. female who presents today for: 
 
1. Encounter for Medicare annual wellness exam 
See other note. 2. Hyperlipidemia, unspecified hyperlipidemia type Will calculate ASCVD risk score pending labs. - LIPID PANEL 3. Seropositive rheumatoid arthritis of multiple sites ([facility] [address] Stable, continue current treatment. Medications Discontinued During This Encounter Medication Reason  psyllium (METAMUCIL) powd  cetirizine (ZYRTEC) 10 mg tablet Reorder Follow-up Disposition: 
Return in about 1 year (around 2/8/2020) for Medicare Wellness Visit (30 min). Medication risks/benefits/costs/interactions/alternatives discussed with patient.  
Advised patient to call back or return to office if symptoms worsen/change/persist. If patient cannot reach us or should anything more severe/urgent arise he/she should proceed directly to the nearest emergency department. Discussed expected course/resolution/complications of diagnosis in detail with patient. Patient given a written after visit summary which includes his/her diagnoses, current medications and vitals. Patient expressed understanding with the diagnosis and plan. Dex Dozier M.D.

## 2019-02-09 LAB
ALBUMIN SERPL-MCNC: 4.4 G/DL (ref 3.5–5.5)
ALBUMIN/GLOB SERPL: 1.9 {RATIO} (ref 1.2–2.2)
ALP SERPL-CCNC: 107 IU/L (ref 39–117)
ALT SERPL-CCNC: 13 IU/L (ref 0–32)
AST SERPL-CCNC: 18 IU/L (ref 0–40)
BASOPHILS # BLD AUTO: 0 X10E3/UL (ref 0–0.2)
BASOPHILS NFR BLD AUTO: 0 %
BILIRUB SERPL-MCNC: 0.3 MG/DL (ref 0–1.2)
BUN SERPL-MCNC: 25 MG/DL (ref 6–24)
BUN/CREAT SERPL: 28 (ref 9–23)
CALCIUM SERPL-MCNC: 9.6 MG/DL (ref 8.7–10.2)
CHLORIDE SERPL-SCNC: 105 MMOL/L (ref 96–106)
CHOLEST SERPL-MCNC: 210 MG/DL (ref 100–199)
CO2 SERPL-SCNC: 23 MMOL/L (ref 20–29)
CREAT SERPL-MCNC: 0.88 MG/DL (ref 0.57–1)
CRP SERPL-MCNC: 9.8 MG/L (ref 0–4.9)
EOSINOPHIL # BLD AUTO: 0.1 X10E3/UL (ref 0–0.4)
EOSINOPHIL NFR BLD AUTO: 2 %
ERYTHROCYTE [DISTWIDTH] IN BLOOD BY AUTOMATED COUNT: 15.3 % (ref 12.3–15.4)
ERYTHROCYTE [SEDIMENTATION RATE] IN BLOOD BY WESTERGREN METHOD: 41 MM/HR (ref 0–40)
GLOBULIN SER CALC-MCNC: 2.3 G/DL (ref 1.5–4.5)
GLUCOSE SERPL-MCNC: 83 MG/DL (ref 65–99)
HCT VFR BLD AUTO: 34.2 % (ref 34–46.6)
HDLC SERPL-MCNC: 63 MG/DL
HGB BLD-MCNC: 11 G/DL (ref 11.1–15.9)
IMM GRANULOCYTES # BLD AUTO: 0 X10E3/UL (ref 0–0.1)
IMM GRANULOCYTES NFR BLD AUTO: 0 %
INTERPRETATION, 910389: NORMAL
LDLC SERPL CALC-MCNC: 128 MG/DL (ref 0–99)
LYMPHOCYTES # BLD AUTO: 1.2 X10E3/UL (ref 0.7–3.1)
LYMPHOCYTES NFR BLD AUTO: 16 %
MCH RBC QN AUTO: 30.6 PG (ref 26.6–33)
MCHC RBC AUTO-ENTMCNC: 32.2 G/DL (ref 31.5–35.7)
MCV RBC AUTO: 95 FL (ref 79–97)
MONOCYTES # BLD AUTO: 0.6 X10E3/UL (ref 0.1–0.9)
MONOCYTES NFR BLD AUTO: 8 %
NEUTROPHILS # BLD AUTO: 5.4 X10E3/UL (ref 1.4–7)
NEUTROPHILS NFR BLD AUTO: 74 %
PLATELET # BLD AUTO: 333 X10E3/UL (ref 150–379)
POTASSIUM SERPL-SCNC: 4.4 MMOL/L (ref 3.5–5.2)
PROT SERPL-MCNC: 6.7 G/DL (ref 6–8.5)
RBC # BLD AUTO: 3.6 X10E6/UL (ref 3.77–5.28)
SODIUM SERPL-SCNC: 144 MMOL/L (ref 134–144)
TRIGL SERPL-MCNC: 93 MG/DL (ref 0–149)
VLDLC SERPL CALC-MCNC: 19 MG/DL (ref 5–40)
WBC # BLD AUTO: 7.3 X10E3/UL (ref 3.4–10.8)

## 2019-02-11 ENCOUNTER — HOSPITAL ENCOUNTER (EMERGENCY)
Age: 59
Discharge: HOME | End: 2019-02-11
Attending: EMERGENCY MEDICINE
Payer: MEDICARE

## 2019-02-11 ENCOUNTER — APPOINTMENT (OUTPATIENT)
Dept: CT IMAGING | Age: 59
End: 2019-02-11
Attending: PHYSICIAN ASSISTANT
Payer: MEDICARE

## 2019-02-11 VITALS
RESPIRATION RATE: 18 BRPM | DIASTOLIC BLOOD PRESSURE: 84 MMHG | TEMPERATURE: 97.9 F | HEART RATE: 81 BPM | OXYGEN SATURATION: 98 % | SYSTOLIC BLOOD PRESSURE: 152 MMHG

## 2019-02-11 DIAGNOSIS — R10.31 RLQ ABDOMINAL PAIN: Primary | ICD-10-CM

## 2019-02-11 LAB
ALBUMIN SERPL-MCNC: 3.8 G/DL (ref 3.5–5)
ALBUMIN/GLOB SERPL: 0.9 {RATIO} (ref 1.1–2.2)
ALP SERPL-CCNC: 113 U/L (ref 45–117)
ALT SERPL-CCNC: 22 U/L (ref 12–78)
ANION GAP SERPL CALC-SCNC: 6 MMOL/L (ref 5–15)
APPEARANCE UR: CLEAR
AST SERPL-CCNC: 43 U/L (ref 15–37)
BACTERIA URNS QL MICRO: NEGATIVE /HPF
BASOPHILS # BLD: 0 K/UL (ref 0–0.1)
BASOPHILS NFR BLD: 0 % (ref 0–1)
BILIRUB SERPL-MCNC: 0.4 MG/DL (ref 0.2–1)
BILIRUB UR QL: NEGATIVE
BUN SERPL-MCNC: 24 MG/DL (ref 6–20)
BUN/CREAT SERPL: 28 (ref 12–20)
CALCIUM SERPL-MCNC: 9.2 MG/DL (ref 8.5–10.1)
CHLORIDE SERPL-SCNC: 108 MMOL/L (ref 97–108)
CO2 SERPL-SCNC: 24 MMOL/L (ref 21–32)
COLOR UR: ABNORMAL
COMMENT, HOLDF: NORMAL
CREAT SERPL-MCNC: 0.85 MG/DL (ref 0.55–1.02)
DIFFERENTIAL METHOD BLD: ABNORMAL
EOSINOPHIL # BLD: 0.2 K/UL (ref 0–0.4)
EOSINOPHIL NFR BLD: 2 % (ref 0–7)
EPITH CASTS URNS QL MICRO: ABNORMAL /LPF
ERYTHROCYTE [DISTWIDTH] IN BLOOD BY AUTOMATED COUNT: 15.1 % (ref 11.5–14.5)
GLOBULIN SER CALC-MCNC: 4.2 G/DL (ref 2–4)
GLUCOSE SERPL-MCNC: 76 MG/DL (ref 65–100)
GLUCOSE UR STRIP.AUTO-MCNC: NEGATIVE MG/DL
HCT VFR BLD AUTO: 37.4 % (ref 35–47)
HGB BLD-MCNC: 11.4 G/DL (ref 11.5–16)
HGB UR QL STRIP: NEGATIVE
IMM GRANULOCYTES # BLD AUTO: 0 K/UL (ref 0–0.04)
IMM GRANULOCYTES NFR BLD AUTO: 1 % (ref 0–0.5)
KETONES UR QL STRIP.AUTO: ABNORMAL MG/DL
LEUKOCYTE ESTERASE UR QL STRIP.AUTO: NEGATIVE
LYMPHOCYTES # BLD: 1.2 K/UL (ref 0.8–3.5)
LYMPHOCYTES NFR BLD: 16 % (ref 12–49)
MCH RBC QN AUTO: 30.4 PG (ref 26–34)
MCHC RBC AUTO-ENTMCNC: 30.5 G/DL (ref 30–36.5)
MCV RBC AUTO: 99.7 FL (ref 80–99)
MONOCYTES # BLD: 0.6 K/UL (ref 0–1)
MONOCYTES NFR BLD: 8 % (ref 5–13)
NEUTS SEG # BLD: 5.7 K/UL (ref 1.8–8)
NEUTS SEG NFR BLD: 74 % (ref 32–75)
NITRITE UR QL STRIP.AUTO: NEGATIVE
NRBC # BLD: 0 K/UL (ref 0–0.01)
NRBC BLD-RTO: 0 PER 100 WBC
PH UR STRIP: 6 [PH] (ref 5–8)
PLATELET # BLD AUTO: 301 K/UL (ref 150–400)
PMV BLD AUTO: 10.5 FL (ref 8.9–12.9)
POTASSIUM SERPL-SCNC: 4.5 MMOL/L (ref 3.5–5.1)
PROT SERPL-MCNC: 8 G/DL (ref 6.4–8.2)
PROT UR STRIP-MCNC: NEGATIVE MG/DL
RBC # BLD AUTO: 3.75 M/UL (ref 3.8–5.2)
RBC #/AREA URNS HPF: ABNORMAL /HPF (ref 0–5)
SAMPLES BEING HELD,HOLD: NORMAL
SODIUM SERPL-SCNC: 138 MMOL/L (ref 136–145)
SP GR UR REFRACTOMETRY: <1.005 (ref 1–1.03)
UR CULT HOLD, URHOLD: NORMAL
UROBILINOGEN UR QL STRIP.AUTO: 0.2 EU/DL (ref 0.2–1)
WBC # BLD AUTO: 7.7 K/UL (ref 3.6–11)
WBC URNS QL MICRO: ABNORMAL /HPF (ref 0–4)

## 2019-02-11 PROCEDURE — 96374 THER/PROPH/DIAG INJ IV PUSH: CPT

## 2019-02-11 PROCEDURE — 81001 URINALYSIS AUTO W/SCOPE: CPT

## 2019-02-11 PROCEDURE — 74177 CT ABD & PELVIS W/CONTRAST: CPT

## 2019-02-11 PROCEDURE — 36415 COLL VENOUS BLD VENIPUNCTURE: CPT

## 2019-02-11 PROCEDURE — 74011000258 HC RX REV CODE- 258: Performed by: RADIOLOGY

## 2019-02-11 PROCEDURE — 99283 EMERGENCY DEPT VISIT LOW MDM: CPT

## 2019-02-11 PROCEDURE — 74011636320 HC RX REV CODE- 636/320: Performed by: RADIOLOGY

## 2019-02-11 PROCEDURE — 80053 COMPREHEN METABOLIC PANEL: CPT

## 2019-02-11 PROCEDURE — 85025 COMPLETE CBC W/AUTO DIFF WBC: CPT

## 2019-02-11 PROCEDURE — 74011250636 HC RX REV CODE- 250/636: Performed by: PHYSICIAN ASSISTANT

## 2019-02-11 RX ORDER — PREDNISONE 20 MG/1
40 TABLET ORAL DAILY
Qty: 10 TAB | Refills: 0 | Status: SHIPPED | OUTPATIENT
Start: 2019-02-11 | End: 2019-02-16

## 2019-02-11 RX ORDER — KETOROLAC TROMETHAMINE 30 MG/ML
15 INJECTION, SOLUTION INTRAMUSCULAR; INTRAVENOUS
Status: COMPLETED | OUTPATIENT
Start: 2019-02-11 | End: 2019-02-11

## 2019-02-11 RX ORDER — SODIUM CHLORIDE 0.9 % (FLUSH) 0.9 %
10 SYRINGE (ML) INJECTION
Status: COMPLETED | OUTPATIENT
Start: 2019-02-11 | End: 2019-02-11

## 2019-02-11 RX ADMIN — SODIUM CHLORIDE 100 ML: 900 INJECTION, SOLUTION INTRAVENOUS at 16:28

## 2019-02-11 RX ADMIN — KETOROLAC TROMETHAMINE 15 MG: 30 INJECTION, SOLUTION INTRAMUSCULAR at 14:43

## 2019-02-11 RX ADMIN — Medication 10 ML: at 16:28

## 2019-02-11 RX ADMIN — IOPAMIDOL 100 ML: 755 INJECTION, SOLUTION INTRAVENOUS at 16:28

## 2019-02-11 NOTE — DISCHARGE INSTRUCTIONS
Patient Education     - return for new or worsening symptoms  - follow up with PCP and Dr. Joan Morrison     Abdominal Pain: Care Instructions  Your Care Instructions    Abdominal pain has many possible causes. Some aren't serious and get better on their own in a few days. Others need more testing and treatment. If your pain continues or gets worse, you need to be rechecked and may need more tests to find out what is wrong. You may need surgery to correct the problem. Don't ignore new symptoms, such as fever, nausea and vomiting, urination problems, pain that gets worse, and dizziness. These may be signs of a more serious problem. Your doctor may have recommended a follow-up visit in the next 8 to 12 hours. If you are not getting better, you may need more tests or treatment. The doctor has checked you carefully, but problems can develop later. If you notice any problems or new symptoms, get medical treatment right away. Follow-up care is a key part of your treatment and safety. Be sure to make and go to all appointments, and call your doctor if you are having problems. It's also a good idea to know your test results and keep a list of the medicines you take. How can you care for yourself at home? · Rest until you feel better. · To prevent dehydration, drink plenty of fluids, enough so that your urine is light yellow or clear like water. Choose water and other caffeine-free clear liquids until you feel better. If you have kidney, heart, or liver disease and have to limit fluids, talk with your doctor before you increase the amount of fluids you drink. · If your stomach is upset, eat mild foods, such as rice, dry toast or crackers, bananas, and applesauce. Try eating several small meals instead of two or three large ones. · Wait until 48 hours after all symptoms have gone away before you have spicy foods, alcohol, and drinks that contain caffeine. · Do not eat foods that are high in fat.   · Avoid anti-inflammatory medicines such as aspirin, ibuprofen (Advil, Motrin), and naproxen (Aleve). These can cause stomach upset. Talk to your doctor if you take daily aspirin for another health problem. When should you call for help? Call 911 anytime you think you may need emergency care. For example, call if:    · You passed out (lost consciousness).     · You pass maroon or very bloody stools.     · You vomit blood or what looks like coffee grounds.     · You have new, severe belly pain.    Call your doctor now or seek immediate medical care if:    · Your pain gets worse, especially if it becomes focused in one area of your belly.     · You have a new or higher fever.     · Your stools are black and look like tar, or they have streaks of blood.     · You have unexpected vaginal bleeding.     · You have symptoms of a urinary tract infection. These may include:  ? Pain when you urinate. ? Urinating more often than usual.  ? Blood in your urine.     · You are dizzy or lightheaded, or you feel like you may faint.    Watch closely for changes in your health, and be sure to contact your doctor if:    · You are not getting better after 1 day (24 hours). Where can you learn more? Go to http://saw-fifi.info/. Enter S967 in the search box to learn more about \"Abdominal Pain: Care Instructions. \"  Current as of: September 23, 2018  Content Version: 11.9  © 1345-0493 [employer]. Care instructions adapted under license by [employer] (which disclaims liability or warranty for this information). If you have questions about a medical condition or this instruction, always ask your healthcare professional. [address] any warranty or liability for your use of this information.

## 2019-02-11 NOTE — PROGRESS NOTES
Dear Ms. Roe, 
 
I hope you are doing well. I wanted to follow up on your recent test results: Your total and LDL cholesterol are still high but improved since last visit. I would encourage a healthy diet and regular exercise before starting medications for this. Please let me know if you have any questions.

## 2019-02-11 NOTE — ED PROVIDER NOTES
62year old female hx RA (on MTX and [place] [place]) presenting for abdominal pain. Pt notes that she thought she was having an RA flare, had pain in the right hip. Talked to Dr. Candy Plata today, told him that she was having pain in the right lower abdomen. Was concerned that it may be appendicitis. Notes that pain first started a couple of weeks ago. Notes that pain is intermittent, but has been worsening. No fever. No N/V/D, notes \"a little\" constipation. Notes worse with walking and activity. Notes some urinary hesitancy. No numbness/weakness or saddle anesthesia. PMHx: anemia, RA, RAD Social: non-smoker Past Medical History:  
Diagnosis Date  Anemia  Reactive airway disease  Rheumatoid arthritis ([facility] [address] 10/24/2017 Past Surgical History:  
Procedure Laterality Date  COLONOSCOPY N/A 1/15/2018 COLONOSCOPY performed by Leslye Jones. Madison Caldwell MD at [facility] ENDOSCOPY  
 HX OTHER SURGICAL    
 pins placed in left wrist   
 
   
Family History:  
Problem Relation Age of Onset  Thyroid Disease Mother  High Cholesterol Mother  Heart Disease Father Social History Socioeconomic History  Marital status:  Spouse name: Not on file  Number of children: Not on file  Years of education: Not on file  Highest education level: Not on file Social Needs  Financial resource strain: Not on file  Food insecurity - worry: Not on file  Food insecurity - inability: Not on file  Transportation needs - medical: Not on file  Transportation needs - non-medical: Not on file Occupational History  Not on file Tobacco Use  Smoking status: Former Smoker Last attempt to quit:  Years since quittin.1  Smokeless tobacco: Never Used Substance and Sexual Activity  Alcohol use: No  
 Drug use: No  
 Sexual activity: No  
Other Topics Concern  Not on file Social History Narrative  Not on file ALLERGIES: Patient has no known allergies. Review of Systems Constitutional: Negative for fever. HENT: Negative for facial swelling. Respiratory: Negative for shortness of breath. Cardiovascular: Negative for chest pain. Gastrointestinal: Positive for abdominal pain. Negative for vomiting. Genitourinary: Negative for dysuria and frequency. Musculoskeletal: Positive for arthralgias. Skin: Negative for wound. All other systems reviewed and are negative. Vitals:  
 02/11/19 1339 02/11/19 1412 BP:  152/84 Pulse: 81 Resp:  18 Temp:  97.9 °F (36.6 °C) SpO2: 98% 98% Physical Exam  
Constitutional: She is oriented to person, place, and time. She appears well-developed and well-nourished. No distress. Pleasant female no distress HENT:  
Head: Normocephalic and atraumatic. Right Ear: External ear normal.  
Left Ear: External ear normal.  
Eyes: Conjunctivae are normal. No scleral icterus. Neck: Neck supple. No tracheal deviation present. Cardiovascular: Normal rate, regular rhythm and normal heart sounds. Exam reveals no gallop and no friction rub. No murmur heard. Pulmonary/Chest: Effort normal and breath sounds normal. No stridor. No respiratory distress. She has no wheezes. Abdominal: Soft. She exhibits no distension.  
+ RLQ abd TTP without rebound or guarding No CVAT No pain with passive flexion of the right hip Musculoskeletal: Normal range of motion. Neurological: She is alert and oriented to person, place, and time. Skin: Skin is warm and dry. Psychiatric: She has a normal mood and affect. Her behavior is normal.  
Nursing note and vitals reviewed. MDM Number of Diagnoses or Management Options RLQ abdominal pain:  
Diagnosis management comments: 62year old female presenting for a couple of weeks of right hip pain, now with pain int he RLQ, worse with movement and walking. No fever. Reassuring labs. No findings on CT.   Discussed possible causes of pain, will give short burst prednisone for ? RA flare. Encouraged close PCP and rheum f/u. Amount and/or Complexity of Data Reviewed Clinical lab tests: ordered and reviewed Tests in the radiology section of CPT®: ordered and reviewed Discuss the patient with other providers: yes (Dr. Rick Enriquez ED attending) Procedures

## 2019-02-11 NOTE — ED TRIAGE NOTES
Patient comes to the ER c/o RLQ/R pelvic pain. Began a couple of weeks ago, increasing today. Reports difficulty walking d/t pain

## 2019-02-20 DIAGNOSIS — Z79.60 LONG-TERM USE OF IMMUNOSUPPRESSANT MEDICATION: ICD-10-CM

## 2019-02-20 DIAGNOSIS — M05.79 SEROPOSITIVE RHEUMATOID ARTHRITIS OF MULTIPLE SITES (HCC): ICD-10-CM

## 2019-02-21 RX ORDER — METHOTREXATE 2.5 MG/1
20 TABLET ORAL
Qty: 96 TAB | Refills: 0 | Status: SHIPPED | OUTPATIENT
Start: 2019-02-25 | End: 2019-05-24 | Stop reason: SDUPTHER

## 2019-03-18 ENCOUNTER — OFFICE VISIT (OUTPATIENT)
Dept: FAMILY MEDICINE CLINIC | Age: 59
End: 2019-03-18

## 2019-03-18 VITALS
DIASTOLIC BLOOD PRESSURE: 98 MMHG | TEMPERATURE: 97.7 F | BODY MASS INDEX: 40.12 KG/M2 | HEART RATE: 67 BPM | HEIGHT: 62 IN | SYSTOLIC BLOOD PRESSURE: 142 MMHG | OXYGEN SATURATION: 99 % | WEIGHT: 218 LBS | RESPIRATION RATE: 18 BRPM

## 2019-03-18 DIAGNOSIS — M25.551 RIGHT HIP PAIN: Primary | ICD-10-CM

## 2019-03-18 RX ORDER — NAPROXEN SODIUM 220 MG
220 TABLET ORAL DAILY
COMMUNITY
End: 2019-11-06

## 2019-03-18 NOTE — PROGRESS NOTES
Chief Complaint   Patient presents with    Leg Pain     right x 6 weeks    Hip Pain     right x 6 weeks      1. Have you been to the ER, urgent care clinic since your last visit? Hospitalized since your last visit? Yes, patient went to [facility] [place] due to leg/abnominal pain 2/11/19. 2. Have you seen or consulted any other health care providers outside of the [address] [place] [address] since your last visit? Include any pap smears or colon screening.  No

## 2019-03-18 NOTE — PATIENT INSTRUCTIONS
Hip Bursitis: Exercises  Your Care Instructions  Here are some examples of typical rehabilitation exercises for your condition. Start each exercise slowly. Ease off the exercise if you start to have pain. Your doctor or physical therapist will tell you when you can start these exercises and which ones will work best for you. How to do the exercises  Hip rotator stretch    1. Lie on your back with both knees bent and your feet flat on the floor. 2. Put the ankle of your affected leg on your opposite thigh near your knee. 3. Use your hand to gently push your knee away from your body until you feel a gentle stretch around your hip. 4. Hold the stretch for 15 to 30 seconds. 5. Repeat 2 to 4 times. 6. Repeat steps 1 through 5, but this time use your hand to gently pull your knee toward your opposite shoulder. Iliotibial band stretch    1. Lean sideways against a wall. If you are not steady on your feet, hold on to a chair or counter. 2. Stand on the leg with the affected hip, with that leg close to the wall. Then cross your other leg in front of it. 3. Let your affected hip drop out to the side of your body and against wall. Then lean away from your affected hip until you feel a stretch. 4. Hold the stretch for 15 to 30 seconds. 5. Repeat 2 to 4 times. Straight-leg raises to the outside    1. Lie on your side, with your affected hip on top. 2. Tighten the front thigh muscles of your top leg to keep your knee straight. 3. Keep your hip and your leg straight in line with the rest of your body, and keep your knee pointing forward. Do not drop your hip back. 4. Lift your top leg straight up toward the ceiling, about 12 inches off the floor. Hold for about 6 seconds, then slowly lower your leg. 5. Repeat 8 to 12 times. Clamshell    1. Lie on your side, with your affected hip on top and your head propped on a pillow. Keep your feet and knees together and your knees bent.   2. Raise your top knee, but keep your feet together. Do not let your hips roll back. Your legs should open up like a clamshell. 3. Hold for 6 seconds. 4. Slowly lower your knee back down. Rest for 10 seconds. 5. Repeat 8 to 12 times. Follow-up care is a key part of your treatment and safety. Be sure to make and go to all appointments, and call your doctor if you are having problems. It's also a good idea to know your test results and keep a list of the medicines you take. Where can you learn more? Go to http://saw-fifi.info/. Enter H420 in the search box to learn more about \"Hip Bursitis: Exercises. \"  Current as of: September 20, 2018  Content Version: 11.9  © 8006-3309 [employer]. Care instructions adapted under license by [employer] (which disclaims liability or warranty for this information). If you have questions about a medical condition or this instruction, always ask your healthcare professional. [address] any warranty or liability for your use of this information.

## 2019-03-18 NOTE — PROGRESS NOTES
Patient Name: Lexie Farrar   MRN: [id number]    Pedrito Garcia is a 62 y.o. female who presents with the following:     Reports 6-week history of right hip/leg pain. Initially thought it was due to a RA flareup but she normally has flareups in both of her hands and feet. Her rheumatologist told her to go to the ER to rule out appendicitis. She had a CT abdomen pelvis which was within normal limits. Using naproxen and cold packs have helped her symptoms. She recently bought a new desk chair. Review of Systems   Constitutional: Negative for fever, malaise/fatigue and weight loss. Respiratory: Negative for cough, hemoptysis, shortness of breath and wheezing. Cardiovascular: Negative for chest pain, palpitations, leg swelling and PND. Gastrointestinal: Negative for abdominal pain, constipation, diarrhea, nausea and vomiting. Musculoskeletal: Positive for joint pain. The patient's medications, allergies, past medical history, surgical history, family history and social history were reviewed and updated where appropriate. Prior to Admission medications    Medication Sig Start Date End Date Taking? Authorizing Provider   naproxen sodium (NAPROSYN) 220 mg tablet Take 220 mg by mouth two (2) times daily (with meals). Yes Provider, Historical   methotrexate (RHEUMATREX) 2.5 mg tablet Take 8 Tabs by mouth every Monday. 2/25/19  Yes Daxa Barry MD   cetirizine (ZYRTEC) 10 mg tablet Take 1 Tab by mouth daily. 2/8/19  Yes Jenna Stephens MD   psyllium (METAMUCIL) powd Take 3.3 g by mouth daily as needed. 2/8/19  Yes Jenna Stephens MD   polyethylene glycol (MIRALAX) 17 gram/dose powder Take 17 g by mouth as needed. Prn constipation 11/18/18  Yes Jenna Stephens MD   tofacitinib (XELJANZ XR) 11 mg Tb24 Take  by mouth daily. Yes Provider, Historical   folic acid (FOLVITE) 1 mg tablet Take 1 mg by mouth daily.  Indications: does not take on Mondays   Yes Provider, Historical   Ibuprofen-diphenhydrAMINE (ADVIL PM) 200-38 mg tab Take 200 mg by mouth as needed. Yes Provider, Historical       No Known Allergies        OBJECTIVE    Visit Vitals  BP (!) 142/98 (BP 1 Location: Right arm, BP Patient Position: Sitting)   Pulse 67   Temp 97.7 °F (36.5 °C) (Oral)   Resp 18   Ht 5' 2\" (1.575 m)   Wt 218 lb (98.9 kg)   SpO2 99%   BMI 39.87 kg/m²       Physical Exam   Constitutional: She is oriented to person, place, and time and well-developed, well-nourished, and in no distress. No distress. Eyes: Conjunctivae and EOM are normal. Pupils are equal, round, and reactive to light. Musculoskeletal:        Right hip: She exhibits decreased range of motion (limited hip flexsion), decreased strength and tenderness (TTP along greater trochanter). She exhibits no swelling, no crepitus and no deformity. Left hip: Normal. She exhibits normal range of motion, normal strength, no tenderness, no swelling, no crepitus and no deformity. Neurological: She is alert and oriented to person, place, and time. Gait normal.   Skin: Skin is warm and dry. She is not diaphoretic. Psychiatric: Mood, memory, affect and judgment normal.   Nursing note and vitals reviewed. ASSESSMENT AND PLAN  Gloria Holt is a 62 y.o. female who presents today for:    1. Right hip pain  Suspect trochanteric bursitis. Recommend warm compresses, PRN naproxen, and exercises. Reviewed signs and symptoms that would indicate a worsening medical condition which would require immediate evaluation and treatment; patient expressed understanding of plan. There are no discontinued medications. Follow-up Disposition:  Return if symptoms worsen or fail to improve. Medication risks/benefits/costs/interactions/alternatives discussed with patient.   Advised patient to call back or return to office if symptoms worsen/change/persist. If patient cannot reach us or should anything more severe/urgent arise he/she should proceed directly to the nearest emergency department. Discussed expected course/resolution/complications of diagnosis in detail with patient. Patient given a written after visit summary which includes his/her diagnoses, current medications and vitals. Patient expressed understanding with the diagnosis and plan. Dex Gonzalez M.D.

## 2019-04-08 ENCOUNTER — DOCUMENTATION ONLY (OUTPATIENT)
Dept: RHEUMATOLOGY | Age: 59
End: 2019-04-08

## 2019-05-06 ENCOUNTER — TELEPHONE (OUTPATIENT)
Dept: RHEUMATOLOGY | Age: 59
End: 2019-05-06

## 2019-05-06 NOTE — TELEPHONE ENCOUNTER
----- Message from [address] [place] [address] sent at 2019  8:51 AM EDT -----  Regarding: Dr. Dilma Pickard / refill   Patient is calling to confirm appt info received at prior appt 18. Patient was given info for another patient  27- Jing Borjas Tuesday, May 21, 2019 10:40 AM.  Patient doesn't have scheduled appt and is needing refills (possibly another week. .  Advised no available appts. And would like the Dr/nurse to return the call.          Best contact [phone]

## 2019-05-07 ENCOUNTER — TELEPHONE (OUTPATIENT)
Dept: RHEUMATOLOGY | Age: 59
End: 2019-05-07

## 2019-05-07 NOTE — TELEPHONE ENCOUNTER
Spoke with pt and made her an appt for May 24th at [facility].  She will call back if she needs refills before then.

## 2019-05-13 ENCOUNTER — OFFICE VISIT (OUTPATIENT)
Dept: FAMILY MEDICINE CLINIC | Age: 59
End: 2019-05-13

## 2019-05-13 VITALS
HEIGHT: 62 IN | HEART RATE: 72 BPM | WEIGHT: 213 LBS | OXYGEN SATURATION: 98 % | DIASTOLIC BLOOD PRESSURE: 86 MMHG | RESPIRATION RATE: 18 BRPM | SYSTOLIC BLOOD PRESSURE: 130 MMHG | BODY MASS INDEX: 39.2 KG/M2 | TEMPERATURE: 97.8 F

## 2019-05-13 DIAGNOSIS — L60.8 TOENAIL DEFORMITY: Primary | ICD-10-CM

## 2019-05-13 RX ORDER — ACETAMINOPHEN 500 MG
1000 TABLET ORAL DAILY
COMMUNITY
End: 2020-01-07

## 2019-05-13 NOTE — PATIENT INSTRUCTIONS
Eating Healthy Foods: Care Instructions  Your Care Instructions    Eating healthy foods can help lower your risk for disease. Healthy food gives you energy and keeps your heart strong, your brain active, your muscles working, and your bones strong. A healthy diet includes a variety of foods from the basic food groups: grains, vegetables, fruits, milk and milk products, and meat and beans. Some people may eat more of their favorite foods from only one food group and, as a result, miss getting the nutrients they need. So, it is important to pay attention not only to what you eat but also to what you are missing from your diet. You can eat a healthy, balanced diet by making a few small changes. Follow-up care is a key part of your treatment and safety. Be sure to make and go to all appointments, and call your doctor if you are having problems. It's also a good idea to know your test results and keep a list of the medicines you take. How can you care for yourself at home? Look at what you eat  · Keep a food diary for a week or two and record everything you eat or drink. Track the number of servings you eat from each food group. · For a balanced diet every day, eat a variety of:  ? 6 or more ounce-equivalents of grains, such as cereals, breads, crackers, rice, or pasta, every day. An ounce-equivalent is 1 slice of bread, 1 cup of ready-to-eat cereal, or ½ cup of cooked rice, cooked pasta, or cooked cereal.  ? 2½ cups of vegetables, especially:  § Dark-green vegetables such as broccoli and spinach. § Orange vegetables such as carrots and sweet potatoes. § Dry beans (such as cox and kidney beans) and peas (such as lentils). ? 2 cups of fresh, frozen, or canned fruit. A small apple or 1 banana or orange equals 1 cup. ? 3 cups of nonfat or low-fat milk, yogurt, or other milk products. ? 5½ ounces of meat and beans, such as chicken, fish, lean meat, beans, nuts, and seeds.  One egg, 1 tablespoon of peanut butter, ½ ounce nuts or seeds, or ¼ cup of cooked beans equals 1 ounce of meat. · Learn how to read food labels for serving sizes and ingredients. Fast-food and convenience-food meals often contain few or no fruits or vegetables. Make sure you eat some fruits and vegetables to make the meal more nutritious. · Look at your food diary. For each food group, add up what you have eaten and then divide the total by the number of days. This will give you an idea of how much you are eating from each food group. See if you can find some ways to change your diet to make it more healthy. Start small  · Do not try to make dramatic changes to your diet all at once. You might feel that you are missing out on your favorite foods and then be more likely to fail. · Start slowly, and gradually change your habits. Try some of the following:  ? Use whole wheat bread instead of white bread. ? Use nonfat or low-fat milk instead of whole milk. ? Eat brown rice instead of white rice, and eat whole wheat pasta instead of white-flour pasta. ? Try low-fat cheeses and low-fat yogurt. ? Add more fruits and vegetables to meals and have them for snacks. ? Add lettuce, tomato, cucumber, and onion to sandwiches. ? Add fruit to yogurt and cereal.  Enjoy food  · You can still eat your favorite foods. You just may need to eat less of them. If your favorite foods are high in fat, salt, and sugar, limit how often you eat them, but do not cut them out entirely. · Eat a wide variety of foods. Make healthy choices when eating out  · The type of restaurant you choose can help you make healthy choices. Even fast-food chains are now offering more low-fat or healthier choices on the menu. · Choose smaller portions, or take half of your meal home. · When eating out, try:  ? A veggie pizza with a whole wheat crust or grilled chicken (instead of sausage or pepperoni).   ? Pasta with roasted vegetables, grilled chicken, or marinara sauce instead of cream sauce. ? A vegetable wrap or grilled chicken wrap. ? Broiled or poached food instead of fried or breaded items. Make healthy choices easy  · Buy packaged, prewashed, ready-to-eat fresh vegetables and fruits, such as baby carrots, salad mixes, and chopped or shredded broccoli and cauliflower. · Buy packaged, presliced fruits, such as melon or pineapple. · Choose 100% fruit or vegetable juice instead of soda. Limit juice intake to 4 to 6 oz (½ to ¾ cup) a day. · Blend low-fat yogurt, fruit juice, and canned or frozen fruit to make a smoothie for breakfast or a snack. Where can you learn more? Go to http://saw-fifi.info/. Enter T756 in the search box to learn more about \"Eating Healthy Foods: Care Instructions. \"  Current as of: March 28, 2018  Content Version: 11.9  © 8265-9580 [employer]. Care instructions adapted under license by [employer] (which disclaims liability or warranty for this information). If you have questions about a medical condition or this instruction, always ask your healthcare professional. [place] [address] any warranty or liability for your use of this information.

## 2019-05-13 NOTE — PROGRESS NOTES
Chief Complaint   Patient presents with    Other     patient reports that she noticed about 1 week ago, that the toenails from both of her feet are turning black, worse on left foot,  feels numbness on left foot        1. Have you been to the ER, urgent care clinic since your last visit? Hospitalized since your last visit? No    2. Have you seen or consulted any other health care providers outside of the [address] [place] [address] since your last visit? Include any pap smears or colon screening.  No

## 2019-05-13 NOTE — PROGRESS NOTES
Patient Name: Freddie April   MRN: [id number]    Tori Arora is a 62 y.o. female who presents with the following:     Noticed that 3 weeks ago, several of her toes on both her feet appear to be black. Denies any known injury or trauma. Specifically, her left first through third toe feels bruised, black around the toenail, and numb. She bought some over-the-counter athlete's foot cream thinking that symptoms were due to fungus but has not seen an improvement. Review of Systems   Constitutional: Negative for fever, malaise/fatigue and weight loss. Respiratory: Negative for cough, hemoptysis, shortness of breath and wheezing. Cardiovascular: Negative for chest pain, palpitations, leg swelling and PND. Gastrointestinal: Negative for abdominal pain, constipation, diarrhea, nausea and vomiting. The patient's medications, allergies, past medical history, surgical history, family history and social history were reviewed and updated where appropriate. Prior to Admission medications    Medication Sig Start Date End Date Taking? Authorizing Provider   acetaminophen (TYLENOL EXTRA STRENGTH) 500 mg tablet Take 1,000 mg by mouth daily. Yes Provider, Historical   naproxen sodium (NAPROSYN) 220 mg tablet Take 220 mg by mouth daily. Yes Provider, Historical   methotrexate (RHEUMATREX) 2.5 mg tablet Take 8 Tabs by mouth every Monday. 2/25/19  Yes Ysabel Goldberg MD   cetirizine (ZYRTEC) 10 mg tablet Take 1 Tab by mouth daily. 2/8/19  Yes Arpita Faulkner MD   polyethylene glycol (MIRALAX) 17 gram/dose powder Take 17 g by mouth as needed. Prn constipation 11/18/18  Yes Arpita Faulkner MD   tofacitinib (XELJANZ XR) 11 mg Tb24 Take  by mouth daily. Yes Provider, Historical   folic acid (FOLVITE) 1 mg tablet Take 1 mg by mouth daily.  Indications: does not take on Mondays   Yes Provider, Historical   Ibuprofen-diphenhydrAMINE (ADVIL PM) 200-38 mg tab Take 200 mg by mouth as needed. Yes Provider, Historical       No Known Allergies        OBJECTIVE    Visit Vitals  /86 (BP 1 Location: Left arm, BP Patient Position: Sitting)   Pulse 72   Temp 97.8 °F (36.6 °C) (Oral)   Resp 18   Ht 5' 2\" (1.575 m)   Wt 213 lb (96.6 kg)   SpO2 98%   BMI 38.96 kg/m²       Physical Exam   Constitutional: She is oriented to person, place, and time and well-developed, well-nourished, and in no distress. No distress. Eyes: Pupils are equal, round, and reactive to light. Conjunctivae and EOM are normal.   Cardiovascular: Normal rate, regular rhythm and normal heart sounds. Exam reveals no gallop and no friction rub. No murmur heard. Pulmonary/Chest: Effort normal and breath sounds normal. No respiratory distress. She has no wheezes. Musculoskeletal: Normal range of motion. She exhibits no edema, tenderness or deformity. Right foot: There is normal range of motion, no tenderness, no swelling and normal capillary refill. Left foot: There is normal range of motion, no tenderness, no swelling and normal capillary refill. Neurological: She is alert and oriented to person, place, and time. Gait normal.   Diabetic foot exam:     Left Foot:   Pulse DP: 2+ (normal)   Filament test: reduced sensation from 1st to 3rd toe      Right Foot:   Pulse DP: 2+ (normal)   Filament test: normal sensation   Skin: Skin is warm and dry. Ecchymosis (mild ecchymoses along base of 2nd MTP of left foot) noted. No rash noted. She is not diaphoretic. No erythema. Left 1st through 3rd toenail appears hyperpigmented with black coloration; similar appearance on right toes but milder   Psychiatric: Mood, memory, affect and judgment normal.   Nursing note and vitals reviewed. ASSESSMENT AND PLAN  Suresh Thomas is a 62 y.o. female who presents today for:    1. Toenail deformity  Unclear etiology; appears to be ecchymoses but pt denies trauma/injury. Refer to podiatry for additional evaluation.   - REFERRAL TO PODIATRY       Medications Discontinued During This Encounter   Medication Reason    psyllium (METAMUCIL) powd            Medication risks/benefits/costs/interactions/alternatives discussed with patient. Advised patient to call back or return to office if symptoms worsen/change/persist. If patient cannot reach us or should anything more severe/urgent arise he/she should proceed directly to the nearest emergency department. Discussed expected course/resolution/complications of diagnosis in detail with patient. Patient given a written after visit summary which includes his/her diagnoses, current medications and vitals. Patient expressed understanding with the diagnosis and plan. Dex Yu M.D.

## 2019-05-24 ENCOUNTER — OFFICE VISIT (OUTPATIENT)
Dept: RHEUMATOLOGY | Age: 59
End: 2019-05-24

## 2019-05-24 VITALS
RESPIRATION RATE: 18 BRPM | HEIGHT: 62 IN | SYSTOLIC BLOOD PRESSURE: 130 MMHG | HEART RATE: 78 BPM | DIASTOLIC BLOOD PRESSURE: 88 MMHG | TEMPERATURE: 98.1 F | BODY MASS INDEX: 37.91 KG/M2 | WEIGHT: 206 LBS

## 2019-05-24 DIAGNOSIS — M70.61 GREATER TROCHANTERIC BURSITIS OF RIGHT HIP: ICD-10-CM

## 2019-05-24 DIAGNOSIS — M05.79 SEROPOSITIVE RHEUMATOID ARTHRITIS OF MULTIPLE SITES (HCC): Primary | ICD-10-CM

## 2019-05-24 DIAGNOSIS — E55.9 VITAMIN D DEFICIENCY: ICD-10-CM

## 2019-05-24 DIAGNOSIS — Z79.60 LONG-TERM USE OF IMMUNOSUPPRESSANT MEDICATION: ICD-10-CM

## 2019-05-24 DIAGNOSIS — M76.31 ILIOTIBIAL BAND SYNDROME OF RIGHT SIDE: ICD-10-CM

## 2019-05-24 RX ORDER — METHOTREXATE 2.5 MG/1
20 TABLET ORAL
Qty: 96 TAB | Refills: 0 | Status: SHIPPED | OUTPATIENT
Start: 2019-05-27 | End: 2019-07-22 | Stop reason: SDUPTHER

## 2019-05-24 NOTE — PROGRESS NOTES
Chief Complaint   Patient presents with    Arthritis     1. Have you been to the ER, urgent care clinic since your last visit? Hospitalized since your last visit? Yes When: February 2019 Where: [facility]. 2. Have you seen or consulted any other health care providers outside of the [address] [place] [address] since your last visit? Include any pap smears or colon screening.  Yes Where: April 2019 Reason for visit: Check up

## 2019-05-24 NOTE — PROGRESS NOTES
REASON FOR VISIT    This is a follow-up visit for Ms. Roe for Seropositive Rheumatoid Arthritis. Inflammatory arthritis phenotype includes:  Anti-CCP positive: yes (>250)  Rheumatoid factor positive: yes (51.6)  Erosive disease: no  Extra-articular manifestations include: bibasilar scarring    Immunosuppression Screening (2/08/2019): Quantiferon TB: indeterminate  PPD:  Negative (1/10/2018)  Hepatitis B: negative  Hepatitis C: negative    Therapy History includes:  Current DMARD therapy includes: methotrexate 20 mg every Monday, Xeljanz 11 mg XR (3/27/2018 to present)  Prior DMARD therapy includes: Enbrel (6 weeks)  The following DMARDs have been ineffective: none  The following DMARDs were stopped because of side effects: Enbrel (injection site reaction, increased cough, other unknown)    Immunizations:   Immunization History   Administered Date(s) Administered    TB Skin Test (PPD) Intradermal 01/08/2018, 01/08/2018     Active problems include:    Patient Active Problem List   Diagnosis Code    Seropositive [facility] sites [facility] M05.79    Anemia D64.9    Long-term use of immunosuppressant medication Z79.899    Primary osteoarthritis of both knees M17.0    Vitamin D deficiency E55.9    Severe obesity (BMI 35.0-39. 9) E66.01    Greater trochanteric bursitis of right hip M70.61    Iliotibial band syndrome of right side M76.31       HISTORY OF PRESENT ILLNESS    Ms. Danyel Banegas returns for a follow-up. On her last visit, I continued methotrexate 20 mg every Monday and Xeljanz 11 mg XR with good tolerance. Today, she feels worse. She has problems in her right outer hip pain. She feels some swelling in her right 3rd PIP associated with stiffness 10-15 minutes without pain. Her toe nails have become black and her toes are numb, so saw her PCP, Dr. Nerissa Kelly, who referred her to podiatry, but she has not gone.     She denies pain, swelling, or stiffness in her feet or elbows. She endorses easy bruising. She denies fever, weight loss, blurred vision, vision loss, oral ulcers, ankle swelling, dry cough, dyspnea, nausea, vomiting, dysphagia, abdominal pain, black or bloody stool, fall since last visit, rash and increased thirst.    Ms. Danyel Banegas has continued her medications for arthritis and reports good tolerance without significant side effects. Last toxicity monitoring by blood work was done on 2/11/2019 and did not reveal any significant adverse effects, except AST 43    Most recent inflammatory markers from 2/08/2019 revealed a ESR 41 mm/hr (previously 28, 28, 29, 41 mm/hr) and CRP 9.8 mg/L (previously 9.8, 4.8, 8.0, 9.5 mg/L). The patient has not had any interval hospital admissions, infections, or surgeries. REVIEW OF SYSTEMS    A comprehensive review of systems was performed and pertinent results are documented in the HPI, review of systems is otherwise non-contributory. PAST MEDICAL HISTORY    She has a past medical history of Anemia, Reactive airway disease, and Rheumatoid arthritis ([facility] (10/24/2017). FAMILY HISTORY    Her family history includes Heart Disease in her father; High Cholesterol in her mother; Thyroid Disease in her mother. SOCIAL HISTORY    She reports that she quit smoking about 7 years ago. She has never used smokeless tobacco. She reports that she does not drink alcohol or use drugs. MEDICATIONS    Current Outpatient Medications   Medication Sig Dispense Refill    [START ON 5/27/2019] methotrexate (RHEUMATREX) 2.5 mg tablet Take 8 Tabs by mouth every Monday. 96 Tab 0    acetaminophen (TYLENOL EXTRA STRENGTH) 500 mg tablet Take 1,000 mg by mouth daily.  naproxen sodium (NAPROSYN) 220 mg tablet Take 220 mg by mouth daily.  cetirizine (ZYRTEC) 10 mg tablet Take 1 Tab by mouth daily. 90 Tab 3    polyethylene glycol (MIRALAX) 17 gram/dose powder Take 17 g by mouth as needed.  Prn constipation 289 g 0    tofacitinib (XELJANZ XR) 11 mg Tb24 Take  by mouth daily.  folic acid (FOLVITE) 1 mg tablet Take 1 mg by mouth daily. Indications: does not take on Mondays      Ibuprofen-diphenhydrAMINE (ADVIL PM) 200-38 mg tab Take 200 mg by mouth as needed. ALLERGIES    No Known Allergies    PHYSICAL EXAMINATION    Visit Vitals  /88   Pulse 78   Temp 98.1 °F (36.7 °C)   Resp 18   Ht 5' 2\" (1.575 m)   Wt 206 lb (93.4 kg)   BMI 37.68 kg/m²     Body mass index is 37.68 kg/m². General: Patient is alert, oriented x 3, not in acute distress, granddaughters at bedside    HEENT:   Sclerae are not injected and appear moist.  There is no alopecia. Neck is supple     Cardiovascular:  Heart is regular rate and rhythm, no murmurs. Chest:  Bibasilar crackles. Extremities:  Free of clubbing, cyanosis, edema    Neurological exam:  Muscle strength is full in upper and lower extremities     Skin exam:  There are no rashes, no alopecia, no discoid lesions, no active Raynaud's, no livedo reticularis, no periungual erythema. Blackened toe nails    Musculoskeletal exam:  A comprehensive musculoskeletal exam was performed for all joints of each upper and lower extremity and assessed for swelling, tenderness and range of motion.  Positive results are documented as below:    Right trochanteric tenderness  Right iliotibial band tenderness  Bilateral knee crepitus without effusion with hyperextension    Z-Deformities:   no  [place] Neck Deformities:  no  Boutonierre's Deformities:  no  Ulnar Deviation:   no  MCP Subluxation:  no     Joint Count 5/24/2019 11/6/2018 8/6/2018 5/1/2018 3/27/2018 12/27/2017   Patient pain (0-100) 60 10 85 10 25 25   MHAQ 0.875 0 0.25 0 0.25 0.125   Left wrist- Tender - - - - 1 -   Left wrist- Swollen - - - 1 1 -   Left 1st MCP - Swollen - - - 1 1 -   Left 2nd PIP - Tender - - - 1 - -   Left 2nd PIP - Swollen - - - 1 - -   Left 3rd PIP - Tender - - 0 1 - -   Left 3rd PIP - Swollen - - 1 1 - -   Left 4th PIP - Tender - - - 1 - -   Left 5th PIP - Tender - - - 1 - -   Right shoulder - Tender - 1 - - - -   Right shoulder - Swollen - 0 - - - -   Right elbow - Tender - - 1 - - 1   Right elbow - Swollen - - 1 1 1 1   Right wrist- Tender - - - 1 1 -   Right wrist- Swollen - - - 1 1 -   Right 1st MCP - Swollen - - - 1 - -   Right 3rd MCP - Swollen - - 1 1 1 -   Right 4th MCP - Tender - - - 1 - -   Right 4th MCP - Swollen - - 1 - 1 -   Right 2nd PIP - Swollen - - - - 1 -   Right 3rd PIP - Tender 1 - 1 1 - -   Right 3rd PIP - Swollen 0 - 1 1 1 -   Right 5th PIP - Tender - - - 1 - -   Right 5th PIP - Swollen - - - 1 - -   Tender Joint Count (Total) 1 1 2 8 2 1   Swollen Joint Count (Total) 0 0 5 10 8 1   Physician Assessment (0-10) 0.5 0 3 3 3 1   Patient Assessment (0-10) 1 1 5.5 2.5 1.5 3   CDAI Total (calculated) 2.5 2 15.5 23.5 14.5 6       DATA REVIEW    Laboratory     Recent laboratory results were reviewed, summarized, and discussed with the patient. Imaging    Musculoskeletal Ultrasound    Ultrasound of the right elbow. Indication: joint pain. (12/27/17)  Using a [employer] e with 12 Mhz probe, limited views of the right elbow were reviewed. This revealed hypoechoic dopplerable collection and anechoic collection within the olecranon fossa. The tendons were normal. Bony contours were regular without erosions seen. There were no soft tissue masses noted. Impression: right elbow synovitis with effusion    Radiographs    Chest 3/27/2018: clear lungs. There is a small calcification or bone island overlying the left costophrenic angle. Heart size is normal. There is no pulmonary edema. There is no evident pneumothorax, adenopathy or effusion. Bilateral Hand 12/27/2017: mild diffuse periarticular osteopenia. The joint space widths are normal. No erosive changes are shown. There is anatomic alignment. No pathological soft tissue calcifications or demonstrated.  No localized areas of soft tissue swelling is shown.    Right Elbow 12/27/2017: mild diffuse osteopenia. There is a small elbow effusion. There is mild to moderate uniform joint space narrowing which is greatest between the capitellum and radial head. No fracture is shown. No erosive changes are evident. Bilateral Foot 12/27/2017: mild diffuse periarticular osteopenia. Minimal joint space widths are demonstrated bilaterally. There are no erosive changes. No pathologic soft tissue calcifications or localized areas of soft tissue swelling are demonstrated. There is anatomic alignment. Deformity of the distal shaft of the left fifth metatarsal bone is shown suggesting remote, healed fracture. There is no evidence for acute fracture. CT Imaging    CT Chest without contrast 4/03/2018: The thyroid gland reveals no nodules. There is no axillary adenopathy. There are no enlarged mediastinal or hilar lymph nodes. There are no pleural or  ericardial effusions. No adrenal lesions. The lungs are well aerated. The central airways are patent. There are minimal linear areas of scarring at each lung base. No pneumonia. No pulmonary edema no evidence for fibrosis. Review of bone windows reveals no destructive lesions. MR Imaging    None    DXA    None    PFT    PFT 4/03/2018: FVC of 2.32 (90%), FEV1 of 1.73 (91%), FEV1/FVC of 74.8% and a DLCO of 15.11 (>80%), TLC 3.88 (96%), VC 2.28 (81%). PATHOLOGY    Kenalog 80 mg IM. (08/06/18)   Ultrasound Guided Right Elbow Kenalog 40 mg IA. (12/27/17)    ASSESSMENT AND PLAN    This is a follow-up visit for Ms. Roe. 1) Seropositive Rheumatoid Arthritis. She is maintained on methotrexate 20 mg every Monday and Xeljanz 11 mg XR with good tolerance. Her CDAI was 2.5 (previously 2, 15.5, 23.5, 14.5, 6) with 1 tender and 0 swollen joints, consistent with remission. I will continue treatment. Labs today and in 3 months. Follow up in 6 months. 2) Long Term Use of Immunosuppressants.  The patient remains on immunomodulatory medications (methotrexate, Brittney Bridge) and requires frequent toxicity monitoring by blood work. Respective labs were ordered (CBC and CMP). 3) Bilateral Knee Osteoarthritis. This was not an active issue today. 4) Dyspnea. This is chronic and she attributes to allergies. She was evaluated by pulmonology in the past and was told she had scar tissue in her left lung. Exam revealed bibasilar crackles. CT Chest showed bibasilar crackles without concerning findings for interstitial lung disease. PFT showed mild small airway obstruction. She was instructed to follow up with pulmonology. This is not an active issue today. 5) Vitamin D Deficiency. Her level was 32.8 (previously 21.3). She reports that ergocalciferol is too costly and has been taking OTC supplements. I will check her level today. 6) Right Trochanteric Bursitis with Right Iliotibial Band Syndrome. I referred her to physical therapy. The patient voiced understanding of the aforementioned assessment and plan. Summary of plan was provided in the After Visit Summary patient instructions.      TODAY'S ORDERS    Orders Placed This Encounter    CBC WITH AUTOMATED DIFF    METABOLIC PANEL, COMPREHENSIVE    C REACTIVE PROTEIN, QT    SED RATE (ESR)    C REACTIVE PROTEIN, QT    CBC WITH AUTOMATED DIFF    METABOLIC PANEL, COMPREHENSIVE    SED RATE (ESR)    VITAMIN D, 25 HYDROXY    REFERRAL TO PHYSICAL THERAPY    methotrexate (RHEUMATREX) 2.5 mg tablet     Future Appointments   Date Time Provider Yolanda Neal   11/18/2019 10:40 AM Mark Lux MD [place], [place], [address] [facility]    Adult Rheumatology   Rheumatology Ultrasound Certified  [address]  [facility], [address] [place] [address]   Phone [phone]  Fax [phone]

## 2019-05-25 LAB
25(OH)D3+25(OH)D2 SERPL-MCNC: 20.7 NG/ML (ref 30–100)
ALBUMIN SERPL-MCNC: 4.6 G/DL (ref 3.5–5.5)
ALBUMIN/GLOB SERPL: 1.8 {RATIO} (ref 1.2–2.2)
ALP SERPL-CCNC: 173 IU/L (ref 39–117)
ALT SERPL-CCNC: 18 IU/L (ref 0–32)
AST SERPL-CCNC: 22 IU/L (ref 0–40)
BASOPHILS # BLD AUTO: 0 X10E3/UL (ref 0–0.2)
BASOPHILS NFR BLD AUTO: 0 %
BILIRUB SERPL-MCNC: 0.3 MG/DL (ref 0–1.2)
BUN SERPL-MCNC: 34 MG/DL (ref 6–24)
BUN/CREAT SERPL: 42 (ref 9–23)
CALCIUM SERPL-MCNC: 9.6 MG/DL (ref 8.7–10.2)
CHLORIDE SERPL-SCNC: 107 MMOL/L (ref 96–106)
CO2 SERPL-SCNC: 25 MMOL/L (ref 20–29)
CREAT SERPL-MCNC: 0.81 MG/DL (ref 0.57–1)
CRP SERPL-MCNC: 6.2 MG/L (ref 0–4.9)
EOSINOPHIL # BLD AUTO: 0.1 X10E3/UL (ref 0–0.4)
EOSINOPHIL NFR BLD AUTO: 2 %
ERYTHROCYTE [DISTWIDTH] IN BLOOD BY AUTOMATED COUNT: 16.2 % (ref 12.3–15.4)
ERYTHROCYTE [SEDIMENTATION RATE] IN BLOOD BY WESTERGREN METHOD: 31 MM/HR (ref 0–40)
GLOBULIN SER CALC-MCNC: 2.5 G/DL (ref 1.5–4.5)
GLUCOSE SERPL-MCNC: 82 MG/DL (ref 65–99)
HCT VFR BLD AUTO: 32.9 % (ref 34–46.6)
HGB BLD-MCNC: 10.3 G/DL (ref 11.1–15.9)
IMM GRANULOCYTES # BLD AUTO: 0 X10E3/UL (ref 0–0.1)
IMM GRANULOCYTES NFR BLD AUTO: 0 %
LYMPHOCYTES # BLD AUTO: 1.1 X10E3/UL (ref 0.7–3.1)
LYMPHOCYTES NFR BLD AUTO: 17 %
MCH RBC QN AUTO: 29.6 PG (ref 26.6–33)
MCHC RBC AUTO-ENTMCNC: 31.3 G/DL (ref 31.5–35.7)
MCV RBC AUTO: 95 FL (ref 79–97)
MONOCYTES # BLD AUTO: 0.6 X10E3/UL (ref 0.1–0.9)
MONOCYTES NFR BLD AUTO: 9 %
NEUTROPHILS # BLD AUTO: 5 X10E3/UL (ref 1.4–7)
NEUTROPHILS NFR BLD AUTO: 72 %
PLATELET # BLD AUTO: 351 X10E3/UL (ref 150–450)
POTASSIUM SERPL-SCNC: 4.6 MMOL/L (ref 3.5–5.2)
PROT SERPL-MCNC: 7.1 G/DL (ref 6–8.5)
RBC # BLD AUTO: 3.48 X10E6/UL (ref 3.77–5.28)
SODIUM SERPL-SCNC: 144 MMOL/L (ref 134–144)
WBC # BLD AUTO: 6.8 X10E3/UL (ref 3.4–10.8)

## 2019-05-27 RX ORDER — ERGOCALCIFEROL 1.25 MG/1
50000 CAPSULE ORAL
Qty: 12 CAP | Refills: 4 | Status: SHIPPED | OUTPATIENT
Start: 2019-05-27 | End: 2019-11-06

## 2019-06-07 ENCOUNTER — HOSPITAL ENCOUNTER (OUTPATIENT)
Dept: PHYSICAL THERAPY | Age: 59
Discharge: HOME | End: 2019-06-07
Payer: MEDICARE

## 2019-06-07 PROCEDURE — 97161 PT EVAL LOW COMPLEX 20 MIN: CPT | Performed by: PHYSICAL THERAPIST

## 2019-06-07 PROCEDURE — 97110 THERAPEUTIC EXERCISES: CPT | Performed by: PHYSICAL THERAPIST

## 2019-06-07 NOTE — PROGRESS NOTES
[facility]  [address], [place], [address]  Phone: [phone]  Fax: [phone]    Evaluation     Name:  Starla Jamison                        Age: 1960                  Gender: female  Referrer:  Juan Judge MD                  Dx:  Right hip pain [M25.551]          Date: 6/7/2019    In time: 1215 P  Out time: 12:55 PM  Total Treatment Time (min): 50  Total Timed Codes (min): 15  1:1 Treatment Time ( only): 50   Visit #: 1    Subjective:  Current symptoms/chief complaint:   Pt presents to PT with referral for R GT bursitis and R ITB syndrome. She states she began having R hip pain in Feb. No PATRICIA. She called Dr. Gus Rojas thinking it could be related to RA-- she was told to go to the ER because it could be appendicitis. At the ER had CT scan-- insignificant findings. Returned to Dr. Gus Rojas-- \"bursitis and I need PT\". She reports \"stiffness\" that radiates from the R side of her lower back into R buttock and down into knee. She was using SPC occasionally which states did not help pain. She was going to [facility] 3x/wk, plans to join [employer] or [facility] which she feels better. She tries to walk for approx 15 min daily however this is painful. Aggravated by: standing, walking, bending  Eased by:  Tylenol, salonpas rub    Pain:   10/10 max 5/10 min 7/10 now       Location of symptoms: R hip, R lower back     Diagnostic Tests: abdominal CT scan in Feb at hospital-- [facility].      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 See above for fitness level/exercise routine    Patient goal(s): \"to be able to walk normal with no pain\"    Objective:    Posture:   Pt overweight contributing to poor posture  Demonstrates inc'd kyphosis and rounded shoulders, inc'd weight shift to L    Gait:  Weight shift to L, dec'd stance time on R LE, dec'd step length on R    ROM  Lumbar AROM  Forward flex= fingers to toes, no change in symptoms  Ext= 50%, no change in symptoms  SBing to R= slightly inc'd p! In R lower back  SBing to L= no change in symptoms    PROM R hip flexion WNL, no pain. Transfers:  Sit-->stand: inc'd weight shift to the L  Pt demonstrates difficulty rolling from supine<-->sidelying, req'd inc'd time for transfers         Strength (1-5)           Right Left Comments   Hip flexion < 3 < 3 Pt reporting pain R lower back with R hip flex, pain R ant hip with L hip flex   Knee flex 5 5    Knee ext 5 5    Pt able to perform sidelying clamshell x1 with R-- pt grimacing, reporting inc'd R hip symptoms  Pt able to perform 50% of supine bridge  Pt unable to perform supine SLR on R, 25% of supine SLR on L       SLS: unable to perform on R or L LE      Neurosensory:  Sensory examination reveals bilat LE dermatomes intact to light touch                              Palpation  Severe TTP with light touch over the following regions: R ITB, R GT, R>L lumbar paraspinals, R glute max/med    Optional Tests  Grecia Test:  [] Neg    [x] Pos    Outcome Measure: Patient presents with a FOTO score of see chart. 15 min Therapeutic Exercise:  [x] See flow sheet : instructed in HEP   Rationale: increase ROM, increase strength, improve coordination, improve balance and increase proprioception to improve the patients ability to walk, exercise, perform ADL's with dec'd symptoms    Ice- to go.           With   [x] TE   [] TA   [] neuro   [] other: Patient Education: [x] Review HEP    [] Progressed/Changed HEP based on:   [x] positioning   [] body mechanics   [] transfers   [x] heat/ice application    [x] other: génesis/phys; PT POC; importance of performing HEP in order to maximize benefit of PT; use of ice for 10-15min 1-2x/day in order to decrease pain/inflammation; advised sleeping/lying on L with pillow between knees for      Pain level post visit: \"I feel better, for real!\"    Assessment: See POC    Plan: See Vinny Lindsay, PT, DPT    6/7/2019

## 2019-06-07 NOTE — PROGRESS NOTES
[facility]  [facility], [address] [place] [address]  Phone: [phone]  Fax: [phone]    Plan of Care/Statement of Necessity for Physical Therapy Services  2-15    Patient name: Sylvia Mendoza  : 1960  Provider#: [id number]  Referral source: Joel Hudson MD      Medical/Treatment Diagnosis: Right hip pain [M25.551]     Prior Hospitalization: see medical history     Comorbidities: see evaluation  Prior Level of Function: see evaluation  Medications: Verified on Patient Summary List  Start of Care: 19      Onset Date: 2019   The Plan of Care and following information is based on the information from the initial evaluation. Assessment/ key information: Pt is a 62year old female presenting with signs and symptoms consistent with referring diagnosis of R GT bursitis and ITB syndrome. She will benefit from PT to address problem list below    Problem List: pain affecting function, decrease ROM, decrease strength, impaired gait/ balance, decrease ADL/ functional abilitiies, decrease activity tolerance, decrease flexibility/ joint mobility and decrease transfer abilities   Treatment Plan may include any combination of the following: Therapeutic exercise, Therapeutic activities, Neuromuscular re-education, Physical agent/modality, Gait/balance training, Manual therapy, Patient education, Self Care training, Functional mobility training, Home safety training and Stair training  Patient / Family readiness to learn indicated by: asking questions, trying to perform skills and interest  Persons(s) to be included in education: patient (P)  Barriers to Learning/Limitations: None  Patient Goal (s): see evaluation  Patient Self Reported Health Status: good  Rehabilitation Potential: good    Short Term Goals:  To be accomplished in 2-3 weeks:  1) Pt will be independent in initial HEP  2) Pt will report >/=25% decrease in exacerbation of symptoms  3) Pt will perform % of supine bridge with inc'd symptoms in order to aid in return to exercise     Long Term Goals: To be accomplished in 6-8 weeks:  1) Pt will perform SLS on R for >/=5 sec with min to no hip drop   2) Pt will perform supine SLR x10 on R in order to demonstrate improvement in strength  3) Pt will demonstrate >/=4/5 R hip flex strength in order to aid in exercise routine  4) Pt will improve FOTO score to >/=57/100 in order to demonstrate improvement in functional mobility    Frequency / Duration: Patient to be seen 1-2 times per week for 6-8 weeks. Patient/ Caregiver education and instruction: self care, activity modification and exercises    [x]  Plan of care has been reviewed with PTA      Certification Period: 6/7/19- 9/2/19  Loan Mac, PT 6/7/2019    ________________________________________________________________________    I certify that the above Therapy Services are being furnished while the patient is under my care. I agree with the treatment plan and certify that this therapy is necessary.     [de-identified] Signature:____________________  Date:____________Time: _________

## 2019-06-19 ENCOUNTER — HOSPITAL ENCOUNTER (OUTPATIENT)
Dept: PHYSICAL THERAPY | Age: 59
Discharge: HOME | End: 2019-06-19
Payer: MEDICARE

## 2019-06-19 PROCEDURE — 97140 MANUAL THERAPY 1/> REGIONS: CPT | Performed by: PHYSICAL THERAPY ASSISTANT

## 2019-06-19 PROCEDURE — 97110 THERAPEUTIC EXERCISES: CPT | Performed by: PHYSICAL THERAPY ASSISTANT

## 2019-06-19 NOTE — PROGRESS NOTES
PT DAILY TREATMENT NOTE - [facility] 2-15    Patient Name: Meli Padilla  Date:2019  : 1960  [x]  Patient  Verified  Payor: [employer] MEDICARE COMPLETE / Plan: [address] / Product Type: Managed Care Medicare /    In time:12:00 Pm  Out time:1:10 Pm  Total Treatment Time (min): 70  Total Timed Codes (min): 60  1:1 Treatment Time ( only): 40   Visit #:  2    Treatment Area: Right hip pain [M25.551]    SUBJECTIVE  Pain Level (0-10 scale): 5-6/10  Any medication changes, allergies to medications, adverse drug reactions, diagnosis change, or new procedure performed?: [x] No    [] Yes (see summary sheet for update)  Subjective functional status/changes:   [] No changes reported  Patient reports compliance with stretches and ice application. States greatest pain when walking or prolonged standing. States she walks every morning ~20 minutes.      OBJECTIVE    Modality rationale: decrease edema, decrease inflammation and decrease pain to improve the patients ability to standing, walking, bending       Min Type Additional Details       [] Estim: []Att   []Unatt    []TENS instruct                  []IFC  []Premod   []NMES                     []Other:  []w/US   []w/ice   []w/heat  Position:  Location:       []  Traction: [] Cervical       []Lumbar                       [] Prone          []Supine                       []Intermittent   []Continuous Lbs:  [] before manual  [] after manual  []w/heat    []  Ultrasound: []Continuous   [] Pulsed                       at: []1MHz   []3MHz Location:  W/cm2:    [] Paraffin         Location:   []w/heat   10 [x]  Ice     []  Heat  []  Ice massage Position: L s/l with pillow between knees  Location: R hip and IT band    []  Laser  []  Other: Position:  Location:      []  Vasopneumatic Device Pressure:       [] lo [] med [] hi   Temperature:      [x] Skin assessment post-treatment:  [x]intact []redness- no adverse reaction    []redness - adverse reaction:     50 min Therapeutic Exercise:  [x] See flow sheet : reviewed HEP, added recumbent elliptical, supine IT band stretch with strap, bridges, supine hip add, supine clamshells using red band   Rationale: increase ROM, increase strength, improve coordination, improve balance and increase proprioception to improve the patients ability to standing, walking, bending        10 min Manual Therapy: STM/TPR R glute med, max, min, piriformis, TFL, IT band    Rationale: decrease pain, increase ROM, increase tissue extensibility and decrease trigger points to improve the patients ability to standing, walking, bending                With   [x] TE   [] TA   [] neuro   [] other: Patient Education: [x] Review HEP    [] Progressed/Changed HEP based on:   [x] positioning   [x] body mechanics   [] transfers   [x] heat/ice application    [x] other: reviewed postural principles; use of pillow between knees while sleeping,   Supportive footwear (tennis shoes) when walking,  Tennis ball for self TPR     Other Objective/Functional Measures: Nt     Pain Level (0-10 scale) post treatment: 4/10    ASSESSMENT/Changes in Function:   Patient tolerated new exercises without complaints, minor report of R groin pain during IT band, modified positioning (lowering R LE) which alleviated symptoms. Gave patient updated HEP, encouraged to perform self TPR/MFR to reduce soft tissue irritation and to continue with ice application at least 3x/day. Patient will continue to benefit from skilled PT services to modify and progress therapeutic interventions, address functional mobility deficits, address ROM deficits, address strength deficits, analyze and address soft tissue restrictions, analyze and cue movement patterns and instruct in home and community integration to attain remaining goals.      []  See Plan of Care  []  See progress note/recertification  []  See Discharge Summary         Progress towards goals / Updated goals:  NT    PLAN  []  Upgrade activities as tolerated     [x]  Continue plan of care  []  Update interventions per flow sheet       []  Discharge due to:_  []  Other:_      Manav Camarena, MALAIKA 6/19/2019

## 2019-06-26 ENCOUNTER — HOSPITAL ENCOUNTER (OUTPATIENT)
Dept: PHYSICAL THERAPY | Age: 59
Discharge: HOME | End: 2019-06-26
Payer: MEDICARE

## 2019-06-26 PROCEDURE — 97110 THERAPEUTIC EXERCISES: CPT | Performed by: PHYSICAL THERAPIST

## 2019-06-26 PROCEDURE — 97140 MANUAL THERAPY 1/> REGIONS: CPT | Performed by: PHYSICAL THERAPIST

## 2019-06-26 NOTE — PROGRESS NOTES
PT DAILY TREATMENT NOTE - [facility] 2-15    Patient Name: Lucia Mancini  Date:2019  : 1960  [x]  Patient  Verified  Payor: [employer] MEDICARE COMPLETE / Plan: [facility] MEDICARE COMPLETE / Product Type: Managed Care Medicare /    In time: 130 PM  Out time: 230 PM  Total Treatment Time (min): 60  Total Timed Codes (min): 50  1:1 Treatment Time ( only): 40   Visit #:  3    Treatment Area: Right hip pain [M25.551]    SUBJECTIVE  Pain Level (0-10 scale): 4-5/10  Any medication changes, allergies to medications, adverse drug reactions, diagnosis change, or new procedure performed?: [x] No    [] Yes (see summary sheet for update)  Subjective functional status/changes:   [] No changes reported  Patient reports continued pain with sitting. She is wondering if she can get a work restriction signed. She reports overall good improvement in symptoms- \"I was a 10/10 pain\".  she is still having pain with seated hip flexion and while sitting for prolonged periods/tranferrring after sitting for prolonged periods    OBJECTIVE    Modality rationale: decrease edema, decrease inflammation and decrease pain to improve the patients ability to standing, walking, bending       Min Type Additional Details       [] Estim: []Att   []Unatt    []TENS instruct                  []IFC  []Premod   []NMES                     []Other:  []w/US   []w/ice   []w/heat  Position:  Location:       []  Traction: [] Cervical       []Lumbar                       [] Prone          []Supine                       []Intermittent   []Continuous Lbs:  [] before manual  [] after manual  []w/heat    []  Ultrasound: []Continuous   [] Pulsed                       at: []1MHz   []3MHz Location:  W/cm2:    [] Paraffin         Location:   []w/heat   10 [x]  Ice     []  Heat  []  Ice massage Position: L s/l with pillow between knees  Location: R hip and IT band    []  Laser  []  Other: Position:  Location:      []  Vasopneumatic Device Pressure:       [] lo [] med [] hi   Temperature:      [x] Skin assessment post-treatment:  [x]intact []redness- no adverse reaction    []redness - adverse reaction:     40 min Therapeutic Exercise:  [x] See flow sheet :    Rationale: increase ROM, increase strength, improve coordination, improve balance and increase proprioception to improve the patients ability to standing, walking, bending        10 min Manual Therapy: STM/TPR R glute med, max, min, piriformis, TFL, IT band with FR   Rationale: decrease pain, increase ROM, increase tissue extensibility and decrease trigger points to improve the patients ability to standing, walking, bending                With   [x] TE   [] TA   [] neuro   [] other: Patient Education: [x] Review HEP    [] Progressed/Changed HEP based on:   [x] positioning   [x] body mechanics   [] transfers   [x] heat/ice application    [x] other: reviewed tennis ball for self TPR     Other Objective/Functional Measures: Nt     Pain Level (0-10 scale) post treatment: \"better\"    ASSESSMENT/Changes in Function:   Again advised on self STM/TPR with tennis ball. Symptoms continuing to improve    Patient will continue to benefit from skilled PT services to modify and progress therapeutic interventions, address functional mobility deficits, address ROM deficits, address strength deficits, analyze and address soft tissue restrictions, analyze and cue movement patterns and instruct in home and community integration to attain remaining goals.      []  See Plan of Care  []  See progress note/recertification  []  See Discharge Summary         Progress towards goals / Updated goals:  NT    PLAN  []  Upgrade activities as tolerated     [x]  Continue plan of care  []  Update interventions per flow sheet       []  Discharge due to:_  []  Other:Keyshawn Leblanc, PT 6/26/2019

## 2019-07-02 ENCOUNTER — APPOINTMENT (OUTPATIENT)
Dept: PHYSICAL THERAPY | Age: 59
End: 2019-07-02

## 2019-07-09 ENCOUNTER — APPOINTMENT (OUTPATIENT)
Dept: PHYSICAL THERAPY | Age: 59
End: 2019-07-09

## 2019-07-22 DIAGNOSIS — Z79.60 LONG-TERM USE OF IMMUNOSUPPRESSANT MEDICATION: ICD-10-CM

## 2019-07-22 DIAGNOSIS — M05.79 SEROPOSITIVE RHEUMATOID ARTHRITIS OF MULTIPLE SITES (HCC): ICD-10-CM

## 2019-07-22 RX ORDER — METHOTREXATE 2.5 MG/1
20 TABLET ORAL
Qty: 96 TAB | Refills: 0 | Status: SHIPPED | OUTPATIENT
Start: 2019-07-22 | End: 2019-10-20 | Stop reason: SDUPTHER

## 2019-08-05 RX ORDER — CETIRIZINE HCL 10 MG
10 TABLET ORAL DAILY
Qty: 90 TAB | Refills: 1 | Status: SHIPPED | OUTPATIENT
Start: 2019-08-05 | End: 2020-01-07

## 2019-08-08 NOTE — ANCILLARY DISCHARGE INSTRUCTIONS
[facility]  [facility], [address] [place] [address]  Phone: [phone]  Fax: [phone]    Discharge Summary  2-15    Patient name: Stephen Morales  : 1960  Provider#:[id number]  Referral source: Hong Manley MD      Medical/Treatment Diagnosis: Right hip pain [M25.551]     Prior Hospitalization: see medical history                                      Comorbidities: see evaluation  Prior Level of Function: see evaluation  Medications: Verified on Patient Summary List    Start of Care: 19                                                                       Onset Date: 2019     Visits from Start of Care: 3     Missed Visits: 0  Reporting Period : 19 to 19    Short Term Goals: To be accomplished in 2-3 weeks:  1) Pt will be independent in initial HEP NOT MET  2) Pt will report >/=25% decrease in exacerbation of symptoms NOT MET  3) Pt will perform % of supine bridge with inc'd symptoms in order to aid in return to exercise NOT MET          Long Term Goals: To be accomplished in 6-8 weeks:  1) Pt will perform SLS on R for >/=5 sec with min to no hip drop NOT MET  2) Pt will perform supine SLR x10 on R in order to demonstrate improvement in strength NOT MET  3) Pt will demonstrate >/=4/5 R hip flex strength in order to aid in exercise routine NOT MET  4) Pt will improve FOTO score to >/=57/100 in order to demonstrate improvement in functional mobility  NOT MET    ASSESSMENT/SUMMARY OF CARE: pt was seen for 3 sessions s/p R GT bursitis and ITB syndrome. Treatment included therapeutic exercises, manual therapy, pt educated, self-care and HEP. Pt failed to FU following last session on 19. D/c from therapy at this time.     RECOMMENDATIONS:  [x]Discontinue therapy: []Patient has reached or is progressing toward set goals      [x]Patient is non-compliant or has abdicated      []Due to lack of appreciable progress towards set goals    Malcolm Grounds, PT 8/8/2019

## 2019-08-27 DIAGNOSIS — M05.79 SEROPOSITIVE RHEUMATOID ARTHRITIS OF MULTIPLE SITES (HCC): ICD-10-CM

## 2019-08-27 DIAGNOSIS — Z79.60 LONG-TERM USE OF IMMUNOSUPPRESSANT MEDICATION: ICD-10-CM

## 2019-08-28 LAB
ALBUMIN SERPL-MCNC: 4.2 G/DL (ref 3.5–5.5)
ALBUMIN/GLOB SERPL: 1.9 {RATIO} (ref 1.2–2.2)
ALP SERPL-CCNC: 119 IU/L (ref 39–117)
ALT SERPL-CCNC: 12 IU/L (ref 0–32)
AST SERPL-CCNC: 18 IU/L (ref 0–40)
BASOPHILS # BLD AUTO: 0 X10E3/UL (ref 0–0.2)
BASOPHILS NFR BLD AUTO: 0 %
BILIRUB SERPL-MCNC: 0.3 MG/DL (ref 0–1.2)
BUN SERPL-MCNC: 21 MG/DL (ref 6–24)
BUN/CREAT SERPL: 26 (ref 9–23)
CALCIUM SERPL-MCNC: 9.6 MG/DL (ref 8.7–10.2)
CHLORIDE SERPL-SCNC: 103 MMOL/L (ref 96–106)
CO2 SERPL-SCNC: 24 MMOL/L (ref 20–29)
CREAT SERPL-MCNC: 0.81 MG/DL (ref 0.57–1)
CRP SERPL-MCNC: 7 MG/L (ref 0–10)
EOSINOPHIL # BLD AUTO: 0.2 X10E3/UL (ref 0–0.4)
EOSINOPHIL NFR BLD AUTO: 2 %
ERYTHROCYTE [DISTWIDTH] IN BLOOD BY AUTOMATED COUNT: 15.5 % (ref 12.3–15.4)
ERYTHROCYTE [SEDIMENTATION RATE] IN BLOOD BY WESTERGREN METHOD: 33 MM/HR (ref 0–40)
GLOBULIN SER CALC-MCNC: 2.2 G/DL (ref 1.5–4.5)
GLUCOSE SERPL-MCNC: 76 MG/DL (ref 65–99)
HCT VFR BLD AUTO: 33.6 % (ref 34–46.6)
HGB BLD-MCNC: 10.3 G/DL (ref 11.1–15.9)
IMM GRANULOCYTES # BLD AUTO: 0 X10E3/UL (ref 0–0.1)
IMM GRANULOCYTES NFR BLD AUTO: 0 %
LYMPHOCYTES # BLD AUTO: 0.7 X10E3/UL (ref 0.7–3.1)
LYMPHOCYTES NFR BLD AUTO: 10 %
MCH RBC QN AUTO: 29.4 PG (ref 26.6–33)
MCHC RBC AUTO-ENTMCNC: 30.7 G/DL (ref 31.5–35.7)
MCV RBC AUTO: 96 FL (ref 79–97)
MONOCYTES # BLD AUTO: 0.6 X10E3/UL (ref 0.1–0.9)
MONOCYTES NFR BLD AUTO: 8 %
NEUTROPHILS # BLD AUTO: 6.2 X10E3/UL (ref 1.4–7)
NEUTROPHILS NFR BLD AUTO: 80 %
PLATELET # BLD AUTO: 317 X10E3/UL (ref 150–450)
POTASSIUM SERPL-SCNC: 4.8 MMOL/L (ref 3.5–5.2)
PROT SERPL-MCNC: 6.4 G/DL (ref 6–8.5)
RBC # BLD AUTO: 3.5 X10E6/UL (ref 3.77–5.28)
SODIUM SERPL-SCNC: 142 MMOL/L (ref 134–144)
WBC # BLD AUTO: 7.8 X10E3/UL (ref 3.4–10.8)

## 2019-10-20 DIAGNOSIS — Z79.60 LONG-TERM USE OF IMMUNOSUPPRESSANT MEDICATION: ICD-10-CM

## 2019-10-20 DIAGNOSIS — M05.79 SEROPOSITIVE RHEUMATOID ARTHRITIS OF MULTIPLE SITES (HCC): ICD-10-CM

## 2019-10-21 RX ORDER — METHOTREXATE 2.5 MG/1
20 TABLET ORAL
Qty: 96 TAB | Refills: 0 | Status: SHIPPED | OUTPATIENT
Start: 2019-10-21 | End: 2020-01-07 | Stop reason: SDUPTHER

## 2019-11-06 ENCOUNTER — OFFICE VISIT (OUTPATIENT)
Dept: FAMILY MEDICINE CLINIC | Age: 59
End: 2019-11-06

## 2019-11-06 VITALS
HEART RATE: 61 BPM | SYSTOLIC BLOOD PRESSURE: 140 MMHG | BODY MASS INDEX: 36.99 KG/M2 | TEMPERATURE: 98.2 F | WEIGHT: 201 LBS | OXYGEN SATURATION: 99 % | HEIGHT: 62 IN | DIASTOLIC BLOOD PRESSURE: 90 MMHG | RESPIRATION RATE: 18 BRPM

## 2019-11-06 DIAGNOSIS — R05.9 COUGH: Primary | ICD-10-CM

## 2019-11-06 DIAGNOSIS — Z91.09 ENVIRONMENTAL ALLERGIES: ICD-10-CM

## 2019-11-06 DIAGNOSIS — R09.82 POST-NASAL DRAINAGE: ICD-10-CM

## 2019-11-06 DIAGNOSIS — J30.9 ALLERGIC RHINITIS, UNSPECIFIED SEASONALITY, UNSPECIFIED TRIGGER: ICD-10-CM

## 2019-11-06 RX ORDER — FLUTICASONE PROPIONATE 50 MCG
2 SPRAY, SUSPENSION (ML) NASAL DAILY
Qty: 1 BOTTLE | Refills: 1 | Status: SHIPPED | OUTPATIENT
Start: 2019-11-06 | End: 2019-11-06 | Stop reason: SDUPTHER

## 2019-11-06 RX ORDER — FLUTICASONE PROPIONATE 50 MCG
2 SPRAY, SUSPENSION (ML) NASAL DAILY
Qty: 3 BOTTLE | Refills: 0 | Status: SHIPPED | OUTPATIENT
Start: 2019-11-06 | End: 2020-07-07

## 2019-11-06 NOTE — PROGRESS NOTES
Chief Complaint   Patient presents with    Cough     pt reports she has had a cough for 2 months that started with cold symptoms. pt thinks she may have been exposed to mold from her bathroom. \"REVIEWED RECORD IN PREPARATION FOR VISIT AND HAVE OBTAINED THE NECESSARY DOCUMENTATION\"  1. Have you been to the ER, urgent care clinic since your last visit? Hospitalized since your last visit? No    2. Have you seen or consulted any other health care providers outside of the [address] [place] [address] since your last visit? Include any pap smears or colon screening.  No

## 2019-11-06 NOTE — PATIENT INSTRUCTIONS
Using a Nasal Steroid Spray: Care Instructions  Your Care Instructions    Your doctor may suggest using a corticosteroid nasal spray for your allergy symptoms or sinus problems. These sprays reduce the swelling inside the nose and sinuses. Unlike decongestant nasal sprays, steroid sprays won't lead to more swelling when you stop taking them. These sprays start working in a few days, but it may take several weeks before you get the full effect. Most side effects are minor. The most common complaint is a burning feeling in the nose right after the spray is used. Some people get nosebleeds. Follow-up care is a key part of your treatment and safety. Be sure to make and go to all appointments, and call your doctor if you are having problems. It's also a good idea to know your test results and keep a list of the medicines you take. How can you care for yourself at home? Here are some tips for using these sprays:  · You may need to prime the sprayer before you use it. This means spraying it into the air a few times to make sure you get the right amount of medicine. Follow the directions on the label. · Blow your nose before you spray. This will help clear out your nostrils. · Gently sniff the medicine into your nose as you spray. Don't snort, or the medicine will go all the way into your throat where it won't do much good. · Aim the nozzle straight toward the outer wall of your nostril. This will help keep the medicine from irritating the inner walls of your nose, especially your septum (the wall that separates your left and right nostrils). · Don't blow your nose for 10 minutes or so after you spray. And try not to sneeze. · Be safe with medicines. Use this medicine exactly as prescribed. Call your doctor if you think you are having a problem with your medicine. · Clean your sprayer once a week. Read the label to learn how. When should you call for help?   Watch closely for changes in your health, and be sure to contact your doctor if you have any problems. Where can you learn more? Go to http://saw-fifi.info/. Enter W574 in the search box to learn more about \"Using a Nasal Steroid Spray: Care Instructions. \"  Current as of: October 21, 2018  Content Version: 12.2  © 3748-3521 [employer]. Care instructions adapted under license by [employer] (which disclaims liability or warranty for this information). If you have questions about a medical condition or this instruction, always ask your healthcare professional. [address] any warranty or liability for your use of this information.

## 2019-11-06 NOTE — PROGRESS NOTES
[facility] Note  Subjective:      Jessika Flores is a 61 y.o. female who presents for an acute visit with the following chief complaints. Chief Complaint   Patient presents with    Cough     pt reports she has had a cough for 2 months that started with cold symptoms. pt thinks she may have been exposed to mold from her bathroom. Cough  Patient complains of cough. Symptoms began 2 months ago with associate cold symptoms. Cough lingers. Cough is dry, very rare thick clear and yellow mucus. Associated runny nose, and nasal irritation, scratchy throat, sore chest.  No wheezing, no SOB or ARNOLD. Cough is aggravated by nothing. Alleviated by nothing. No previous evaluations. Treatments: Robitussin, halls cough drops, honey and lemon with some relief. Patient does not have new pets. Patient does not have a history of asthma. History of reactive airway disease after exposure to an occupational chemical. Patient does have a history of environmental allergens. Patient does not have recent travel. Patient does not have a history of smoking. Patient  does not have previous Chest X-ray. Patient does not have had a PPD done. She is worried about mold in her bathroom apartment and is having a hard time getting the apartment to address this issue. Current Outpatient Medications   Medication Sig Dispense Refill    fluticasone propionate (FLONASE) 50 mcg/actuation nasal spray 2 Sprays by Both Nostrils route daily. 1 Bottle 1    methotrexate (RHEUMATREX) 2.5 mg tablet Take 8 Tabs by mouth every Monday. 96 Tab 0    cetirizine (ZYRTEC) 10 mg tablet Take 1 Tab by mouth daily. 90 Tab 1    polyethylene glycol (MIRALAX) 17 gram/dose powder Take 17 g by mouth daily as needed (constipation). Prn constipation 289 g 0    acetaminophen (TYLENOL EXTRA STRENGTH) 500 mg tablet Take 1,000 mg by mouth daily.  tofacitinib (XELJANZ XR) 11 mg Tb24 Take  by mouth daily.       folic acid (FOLVITE) 1 mg tablet Take 1 mg by mouth daily. Indications: does not take on Mondays      Ibuprofen-diphenhydrAMINE (ADVIL PM) 200-38 mg tab Take 200 mg by mouth as needed. No Known Allergies    ROS:   Complete review of systems was reviewed with pertinent information listed in HPI. Review of Systems   Constitutional: Negative for chills, diaphoresis, fever, malaise/fatigue and weight loss. HENT: Positive for congestion and sore throat. Negative for ear pain, hearing loss, sinus pain and tinnitus. Eyes: Negative for blurred vision. Respiratory: Positive for cough. Negative for shortness of breath and wheezing. Cardiovascular: Negative for chest pain and palpitations. Gastrointestinal: Negative for abdominal pain, diarrhea, heartburn, nausea and vomiting. Genitourinary: Negative for dysuria. Musculoskeletal: Negative for myalgias. Skin: Negative for rash. Neurological: Negative for dizziness and headaches. Objective:     Visit Vitals  /90   Pulse 61   Temp 98.2 °F (36.8 °C) (Oral)   Resp 18   Ht 5' 2\" (1.575 m)   Wt 201 lb (91.2 kg)   SpO2 99%   BMI 36.76 kg/m²       Vitals and Nurse Documentation reviewed. Physical Exam   Constitutional: She is oriented to person, place, and time and well-developed, well-nourished, and in no distress. She does not have a sickly appearance. HENT:   Head: Normocephalic and atraumatic. Right Ear: Hearing, external ear and ear canal normal. Tympanic membrane is not erythematous and not bulging. No middle ear effusion. Left Ear: Hearing, external ear and ear canal normal. Tympanic membrane is not erythematous and not bulging. No middle ear effusion. Nose: Mucosal edema present. No rhinorrhea, nose lacerations, sinus tenderness, nasal deformity, septal deviation or nasal septal hematoma. No epistaxis. No foreign bodies. Right sinus exhibits no maxillary sinus tenderness and no frontal sinus tenderness.  Left sinus exhibits no maxillary sinus tenderness and no frontal sinus tenderness. Mouth/Throat: Uvula is midline, oropharynx is clear and moist and mucous membranes are normal. Mucous membranes are not pale, not dry and not cyanotic. She has dentures. No oral lesions. No oropharyngeal exudate, posterior oropharyngeal edema, posterior oropharyngeal erythema or tonsillar abscesses. Bilaterally inferior nasal turbinates are pale and boggy. post oropharyngeal clear drainage noted    Eyes: Pupils are equal, round, and reactive to light. Conjunctivae and lids are normal. Right conjunctiva is not injected. Left conjunctiva is not injected. Neck: Trachea normal, normal range of motion and phonation normal. Neck supple. No tracheal deviation present. Cardiovascular: Normal rate, regular rhythm, S1 normal, S2 normal, normal heart sounds and intact distal pulses. Exam reveals no gallop, no distant heart sounds and no friction rub. No murmur heard. Pulmonary/Chest: Effort normal and breath sounds normal. No accessory muscle usage. No respiratory distress. She has no decreased breath sounds. She has no wheezes. She has no rhonchi. She has no rales. She exhibits no tenderness. Musculoskeletal: She exhibits no edema. Lymphadenopathy:     She has no cervical adenopathy. Neurological: She is alert and oriented to person, place, and time. Gait normal.   Skin: Skin is warm, dry and intact. No rash noted. She is not diaphoretic. No cyanosis. Nails show no clubbing. Psychiatric: Mood and affect normal.   Nursing note and vitals reviewed. Assessment/Plan:     Diagnoses and all orders for this visit:    1. Cough: Subacute dry cough with associated rhinitis. Appears to be allergy mediated. Continue daily Zyrtec, start Flonase daily. RTC if symptoms worsen or do not resolve. 2. Post-nasal drainage  -     fluticasone propionate (FLONASE) 50 mcg/actuation nasal spray; 2 Sprays by Both Nostrils route daily.     3. Allergic rhinitis, unspecified seasonality, unspecified trigger  -     fluticasone propionate (FLONASE) 50 mcg/actuation nasal spray; 2 Sprays by Both Nostrils route daily. 4. Environmental allergies: She is worried about possible mold in home. Referral for allergy testing provided if symptoms persist despite therapy above. -     REFERRAL TO IMMUNOLOGY          Pt expressed understanding with the diagnosis and plan    Follow-up and Dispositions    · Return if symptoms worsen or fail to improve, for Follow-up.          Discussed expected course/resolution/complications of diagnosis in detail with patient.    Medication risks/benefits/costs/interactions/alternatives discussed with patient.    Pt was given an after visit summary which includes diagnoses, current medications & vitals.  Pt expressed understanding with the diagnosis and plan

## 2020-01-07 ENCOUNTER — OFFICE VISIT (OUTPATIENT)
Dept: RHEUMATOLOGY | Age: 60
End: 2020-01-07

## 2020-01-07 VITALS
SYSTOLIC BLOOD PRESSURE: 146 MMHG | DIASTOLIC BLOOD PRESSURE: 86 MMHG | WEIGHT: 202 LBS | RESPIRATION RATE: 18 BRPM | BODY MASS INDEX: 37.17 KG/M2 | HEIGHT: 62 IN | TEMPERATURE: 98.1 F | HEART RATE: 80 BPM

## 2020-01-07 DIAGNOSIS — Z79.60 LONG-TERM USE OF IMMUNOSUPPRESSANT MEDICATION: ICD-10-CM

## 2020-01-07 DIAGNOSIS — M05.79 SEROPOSITIVE RHEUMATOID ARTHRITIS OF MULTIPLE SITES (HCC): Primary | ICD-10-CM

## 2020-01-07 DIAGNOSIS — E55.9 VITAMIN D DEFICIENCY: ICD-10-CM

## 2020-01-07 RX ORDER — METHOTREXATE 2.5 MG/1
20 TABLET ORAL
Qty: 96 TAB | Refills: 0 | Status: SHIPPED | OUTPATIENT
Start: 2020-01-13 | End: 2020-03-23 | Stop reason: SDUPTHER

## 2020-01-07 NOTE — PROGRESS NOTES
Chief Complaint   Patient presents with    Arthritis     1. Have you been to the ER, urgent care clinic since your last visit? Hospitalized since your last visit? No    2. Have you seen or consulted any other health care providers outside of the [address] [place] [address] since your last visit? Include any pap smears or colon screening.  No

## 2020-01-07 NOTE — PROGRESS NOTES
REASON FOR VISIT    This is a follow-up visit for Ms. Roe for    ICD-10-CM   1. Seropositive rheumatoid arthritis of multiple sites ([facility] [place] [facility] M05.79     Inflammatory arthritis phenotype includes:  Anti-CCP positive: yes (>250)  Rheumatoid factor positive: yes (51.6)  Erosive disease: no  Extra-articular manifestations include: bibasilar scarring    Immunosuppression Screening (2/08/2019): Quantiferon TB: indeterminate  PPD:  Negative (1/10/2018)  Hepatitis B: negative  Hepatitis C: negative    Therapy History includes:  Current DMARD therapy includes: methotrexate 20 mg every Monday, Xeljanz 11 mg XR (3/27/2018 to 12/2019)  Prior DMARD therapy includes: Enbrel (6 weeks)  The following DMARDs have been ineffective: none  The following DMARDs were stopped because of side effects: Enbrel (injection site reaction, increased cough, other unknown)    Immunizations:   Immunization History   Administered Date(s) Administered    TB Skin Test (PPD) Intradermal 01/08/2018, 01/08/2018     Active problems include:    Patient Active Problem List   Diagnosis Code    Seropositive rheumatoid arthritis of multiple sites ([facility] [place] [facility] M05.79    Anemia D64.9    Long-term use of immunosuppressant medication Z79.899    Primary osteoarthritis of both knees M17.0    Vitamin D deficiency E55.9    Severe obesity (BMI 35.0-39. 9) E66.01    Greater trochanteric bursitis of right hip M70.61    Iliotibial band syndrome of right side M76.31       HISTORY OF PRESENT ILLNESS    Ms. Nick Mckee returns for a follow-up. On her last visit, I continued methotrexate 20 mg every Monday and Xeljanz 11 mg XR with good tolerance. She has been out of [facility] since 12/2019. Today, she feels a little wore coming off [facility]. She notes some swelling in her hands in the morning without pain or stiffness. She has right shoulder pain that improves with activity.     She endorses dry cough, rash on her face that she medicated with hydrocortisone, easy bruising. She denies fever, weight loss, blurred vision, vision loss, oral ulcers, ankle swelling, dyspnea, nausea, vomiting, dysphagia, abdominal pain, black or bloody stool, fall since last visit and increased thirst.    Ms. Osiris Sierra has continued her medications for arthritis and reports good tolerance without significant side effects. Last toxicity monitoring by blood work was done on 8/37/2019 and did not reveal any significant adverse effects, except     Most recent inflammatory markers from 8/37/2019 revealed a ESR 33 mm/hr and CRP 7 mg/L. The patient has not had any interval hospital admissions, infections, or surgeries. REVIEW OF SYSTEMS    A comprehensive review of systems was performed and pertinent results are documented in the HPI, review of systems is otherwise non-contributory. PAST MEDICAL HISTORY    She has a past medical history of Anemia, Reactive airway disease, and Rheumatoid arthritis ([facility] [facility] (10/24/2017). FAMILY HISTORY    Her family history includes Heart Disease in her father; High Cholesterol in her mother; Thyroid Disease in her mother. SOCIAL HISTORY    She reports that she quit smoking about 8 years ago. She has never used smokeless tobacco. She reports that she does not drink alcohol or use drugs. MEDICATIONS    Current Outpatient Medications   Medication Sig Dispense Refill    [START ON 1/13/2020] methotrexate (RHEUMATREX) 2.5 mg tablet Take 8 Tabs by mouth every Monday. 96 Tab 0    tofacitinib (XELJANZ XR) 11 mg Tb24 Take 11 mg by mouth daily. 30 Tab 11    fluticasone propionate (FLONASE) 50 mcg/actuation nasal spray 2 Sprays by Both Nostrils route daily. (Patient taking differently: 2 Sprays by Both Nostrils route as needed.) 3 Bottle 0    polyethylene glycol (MIRALAX) 17 gram/dose powder Take 17 g by mouth daily as needed (constipation). Prn constipation 265 g 0    folic acid (FOLVITE) 1 mg tablet Take 1 mg by mouth daily.  Indications: does not take on Mondays      Ibuprofen-diphenhydrAMINE (ADVIL PM) 200-38 mg tab Take 200 mg by mouth as needed. ALLERGIES    No Known Allergies    PHYSICAL EXAMINATION    Visit Vitals  /86   Pulse 80   Temp 98.1 °F (36.7 °C)   Resp 18   Ht 5' 2\" (1.575 m)   Wt 202 lb (91.6 kg)   BMI 36.95 kg/m²     Body mass index is 36.95 kg/m². General: Patient is alert, oriented x 3, not in acute distress, granddaughters at bedside    HEENT:   Sclerae are not injected and appear moist.  There is no alopecia. Neck is supple     Cardiovascular:  Heart is regular rate and rhythm, no murmurs. Chest:  Bibasilar crackles. Extremities:  Free of clubbing, cyanosis, edema    Neurological exam:  Muscle strength is full in upper and lower extremities     Skin exam:  There are no rashes, no alopecia, no discoid lesions, no active Raynaud's, no livedo reticularis, no periungual erythema. Blackened toe nails    Musculoskeletal exam:  A comprehensive musculoskeletal exam was performed for all joints of each upper and lower extremity and assessed for swelling, tenderness and range of motion.  Positive results are documented as below:    Right trochanteric tenderness  Right iliotibial band tenderness  Bilateral knee crepitus without effusion with hyperextension    Z-Deformities:   no  [place] Neck Deformities:  no  Boutonierre's Deformities:  no  Ulnar Deviation:   no  MCP Subluxation:  no     Joint Count 1/7/2020 5/24/2019 11/6/2018 8/6/2018 5/1/2018 3/27/2018 12/27/2017   Patient pain (0-100) 40 60 10 85 10 25 25   MHAQ 0 0.875 0 0.25 0 0.25 0.125   Left wrist- Tender - - - - - 1 -   Left wrist- Swollen - - - - 1 1 -   Left 1st MCP - Swollen - - - - 1 1 -   Left 2nd PIP - Tender - - - - 1 - -   Left 2nd PIP - Swollen - - - - 1 - -   Left 3rd PIP - Tender - - - 0 1 - -   Left 3rd PIP - Swollen - - - 1 1 - -   Left 4th PIP - Tender - - - - 1 - -   Left 5th PIP - Tender - - - - 1 - -   Right shoulder - Tender 1 - 1 - - - - Right shoulder - Swollen 0 - 0 - - - -   Right elbow - Tender - - - 1 - - 1   Right elbow - Swollen - - - 1 1 1 1   Right wrist- Tender - - - - 1 1 -   Right wrist- Swollen - - - - 1 1 -   Right 1st MCP - Swollen - - - - 1 - -   Right 3rd MCP - Swollen - - - 1 1 1 -   Right 4th MCP - Tender - - - - 1 - -   Right 4th MCP - Swollen - - - 1 - 1 -   Right 2nd PIP - Swollen - - - - - 1 -   Right 3rd PIP - Tender - 1 - 1 1 - -   Right 3rd PIP - Swollen - 0 - 1 1 1 -   Right 5th PIP - Tender - - - - 1 - -   Right 5th PIP - Swollen - - - - 1 - -   Tender Joint Count (Total) 1 1 1 2 8 2 1   Swollen Joint Count (Total) 0 0 0 5 10 8 1   Physician Assessment (0-10) 0.5 0.5 0 3 3 3 1   Patient Assessment (0-10) 2 1 1 5.5 2.5 1.5 3   CDAI Total (calculated) 3.5 2.5 2 15.5 23.5 14.5 6       DATA REVIEW    Laboratory     Recent laboratory results were reviewed, summarized, and discussed with the patient. Imaging    Musculoskeletal Ultrasound    Ultrasound of the right elbow. Indication: joint pain. (12/27/17)  Using a [employer] e with 12 Mhz probe, limited views of the right elbow were reviewed. This revealed hypoechoic dopplerable collection and anechoic collection within the olecranon fossa. The tendons were normal. Bony contours were regular without erosions seen. There were no soft tissue masses noted. Impression: right elbow synovitis with effusion    Radiographs    Chest 3/27/2018: clear lungs. There is a small calcification or bone island overlying the left costophrenic angle. Heart size is normal. There is no pulmonary edema. There is no evident pneumothorax, adenopathy or effusion. Bilateral Hand 12/27/2017: mild diffuse periarticular osteopenia. The joint space widths are normal. No erosive changes are shown. There is anatomic alignment. No pathological soft tissue calcifications or demonstrated. No localized areas of soft tissue swelling is shown. Right Elbow 12/27/2017: mild diffuse osteopenia.  There is a small elbow effusion. There is mild to moderate uniform joint space narrowing which is greatest between the capitellum and radial head. No fracture is shown. No erosive changes are evident. Bilateral Foot 12/27/2017: mild diffuse periarticular osteopenia. Minimal joint space widths are demonstrated bilaterally. There are no erosive changes. No pathologic soft tissue calcifications or localized areas of soft tissue swelling are demonstrated. There is anatomic alignment. Deformity of the distal shaft of the left fifth metatarsal bone is shown suggesting remote, healed fracture. There is no evidence for acute fracture. CT Imaging    CT Chest without contrast 4/03/2018: The thyroid gland reveals no nodules. There is no axillary adenopathy. There are no enlarged mediastinal or hilar lymph nodes. There are no pleural or  ericardial effusions. No adrenal lesions. The lungs are well aerated. The central airways are patent. There are minimal linear areas of scarring at each lung base. No pneumonia. No pulmonary edema no evidence for fibrosis. Review of bone windows reveals no destructive lesions. MR Imaging    None    DXA    None    PFT    PFT 4/03/2018: FVC of 2.32 (90%), FEV1 of 1.73 (91%), FEV1/FVC of 74.8% and a DLCO of 15.11 (>80%), TLC 3.88 (96%), VC 2.28 (81%). PATHOLOGY    Kenalog 80 mg IM. (08/06/18)   Ultrasound Guided Right Elbow Kenalog 40 mg IA. (12/27/17)    ASSESSMENT AND PLAN    This is a follow-up visit for Ms. Roe. 1) Seropositive Rheumatoid Arthritis. She is maintained on methotrexate 20 mg every Monday and Xeljanz 11 mg XR with good tolerance but has been off [place] for about a month for unknown reason. Her CDAI was 3.5 (previously 2.5, 2, 15.5, 23.5, 14.5, 6) with 1 tender and 0 swollen joints, consistent with low disease activity. Her right shoulder is symptomatic but she declines an injection today. I will resubmit for [place]. . I will continue treatment. Labs today.  Follow up in 6 months. 2) Long Term Use of Immunosuppressants. The patient remains on immunomodulatory medications (methotrexate, Eliz Kitty) and requires frequent toxicity monitoring by blood work. Respective labs were ordered (CBC and CMP). 3) Bilateral Knee Osteoarthritis. This was not an active issue today. 4) Dyspnea. This is chronic and she attributes to allergies. She was evaluated by pulmonology in the past and was told she had scar tissue in her left lung. Exam revealed bibasilar crackles. CT Chest showed bibasilar crackles without concerning findings for interstitial lung disease. PFT showed mild small airway obstruction. She was instructed to follow up with pulmonology. This is not an active issue today. 5) Vitamin D Deficiency. Her level was 32.8 (previously 21.3). She reports that ergocalciferol is too costly and has been taking OTC supplements. I will check her level today. 6) Right Trochanteric Bursitis with Right Iliotibial Band Syndrome. I referred her to physical therapy. The patient voiced understanding of the aforementioned assessment and plan. Summary of plan was provided in the After Visit Summary patient instructions.      TODAY'S ORDERS    Orders Placed This Encounter    QUANTIFERON-TB GOLD PLUS    CHRONIC HEPATITIS PANEL    CBC WITH AUTOMATED DIFF    METABOLIC PANEL, COMPREHENSIVE    C REACTIVE PROTEIN, QT    SED RATE (ESR)    VITAMIN D, 25 HYDROXY    methotrexate (RHEUMATREX) 2.5 mg tablet    tofacitinib (XELJANZ XR) 11 mg Tb24     Future Appointments   Date Time Provider Yolanda Neal   7/7/2020 10:20 AM Tootie Harry  Fm Arjun Suarez MD, [address] [facility]    Adult Rheumatology   Rheumatology Ultrasound Certified  [facility]  A Part of [address]   Phone [phone]  Fax [phone]

## 2020-01-07 NOTE — LETTER
1/7/20 Patient: Abdi Case YOB: 1960 Date of Visit: 1/7/2020 Gopal Adler MD 
[address] [facility] [address] [phone] VIA In Basket Dear Gopal Adler MD, Thank you for referring Ms. Abdi Case to [address] [place] [address] for evaluation. My notes for this consultation are attached. If you have questions, please do not hesitate to call me. I look forward to following your patient along with you.  
 
 
Sincerely, 
 
Umair Crowell MD

## 2020-01-10 LAB
25(OH)D3+25(OH)D2 SERPL-MCNC: 21.1 NG/ML (ref 30–100)
ALBUMIN SERPL-MCNC: 4.3 G/DL (ref 3.5–5.5)
ALBUMIN/GLOB SERPL: 1.7 {RATIO} (ref 1.2–2.2)
ALP SERPL-CCNC: 133 IU/L (ref 39–117)
ALT SERPL-CCNC: 14 IU/L (ref 0–32)
AST SERPL-CCNC: 23 IU/L (ref 0–40)
BASOPHILS # BLD AUTO: 0 X10E3/UL (ref 0–0.2)
BASOPHILS NFR BLD AUTO: 1 %
BILIRUB SERPL-MCNC: 0.3 MG/DL (ref 0–1.2)
BUN SERPL-MCNC: 19 MG/DL (ref 6–24)
BUN/CREAT SERPL: 28 (ref 9–23)
CALCIUM SERPL-MCNC: 9.2 MG/DL (ref 8.7–10.2)
CHLORIDE SERPL-SCNC: 104 MMOL/L (ref 96–106)
CO2 SERPL-SCNC: 25 MMOL/L (ref 20–29)
COMMENT, 144067: NORMAL
CREAT SERPL-MCNC: 0.67 MG/DL (ref 0.57–1)
CRP SERPL-MCNC: 17 MG/L (ref 0–10)
EOSINOPHIL # BLD AUTO: 0.2 X10E3/UL (ref 0–0.4)
EOSINOPHIL NFR BLD AUTO: 3 %
ERYTHROCYTE [DISTWIDTH] IN BLOOD BY AUTOMATED COUNT: 14.2 % (ref 11.7–15.4)
ERYTHROCYTE [SEDIMENTATION RATE] IN BLOOD BY WESTERGREN METHOD: 56 MM/HR (ref 0–40)
GAMMA INTERFERON BACKGROUND BLD IA-ACNC: 0.04 IU/ML
GLOBULIN SER CALC-MCNC: 2.5 G/DL (ref 1.5–4.5)
GLUCOSE SERPL-MCNC: 84 MG/DL (ref 65–99)
HBV CORE AB SERPL QL IA: NEGATIVE
HBV CORE IGM SERPL QL IA: NEGATIVE
HBV E AB SERPL QL IA: NEGATIVE
HBV E AG SERPL QL IA: POSITIVE
HBV SURFACE AB SER QL: NON REACTIVE
HBV SURFACE AG SERPL QL IA: NEGATIVE
HCT VFR BLD AUTO: 33.4 % (ref 34–46.6)
HCV AB S/CO SERPL IA: <0.1 S/CO RATIO (ref 0–0.9)
HGB BLD-MCNC: 11 G/DL (ref 11.1–15.9)
IMM GRANULOCYTES # BLD AUTO: 0 X10E3/UL (ref 0–0.1)
IMM GRANULOCYTES NFR BLD AUTO: 0 %
LYMPHOCYTES # BLD AUTO: 0.5 X10E3/UL (ref 0.7–3.1)
LYMPHOCYTES NFR BLD AUTO: 8 %
M TB IFN-G BLD-IMP: NEGATIVE
M TB IFN-G CD4+ BCKGRND COR BLD-ACNC: 0.04 IU/ML
MCH RBC QN AUTO: 30 PG (ref 26.6–33)
MCHC RBC AUTO-ENTMCNC: 32.9 G/DL (ref 31.5–35.7)
MCV RBC AUTO: 91 FL (ref 79–97)
MITOGEN IGNF BLD-ACNC: 7.92 IU/ML
MONOCYTES # BLD AUTO: 0.5 X10E3/UL (ref 0.1–0.9)
MONOCYTES NFR BLD AUTO: 9 %
NEUTROPHILS # BLD AUTO: 4.7 X10E3/UL (ref 1.4–7)
NEUTROPHILS NFR BLD AUTO: 79 %
PLATELET # BLD AUTO: 303 X10E3/UL (ref 150–450)
POTASSIUM SERPL-SCNC: 4.2 MMOL/L (ref 3.5–5.2)
PROT SERPL-MCNC: 6.8 G/DL (ref 6–8.5)
QUANTIFERON INCUBATION, QF1T: NORMAL
QUANTIFERON TB2 AG: 0.04 IU/ML
RBC # BLD AUTO: 3.67 X10E6/UL (ref 3.77–5.28)
SERVICE CMNT-IMP: NORMAL
SODIUM SERPL-SCNC: 143 MMOL/L (ref 134–144)
WBC # BLD AUTO: 5.9 X10E3/UL (ref 3.4–10.8)

## 2020-01-10 RX ORDER — ERGOCALCIFEROL 1.25 MG/1
50000 CAPSULE ORAL
Qty: 12 CAP | Refills: 4 | Status: SHIPPED | OUTPATIENT
Start: 2020-01-10 | End: 2020-01-10

## 2020-01-10 RX ORDER — ERGOCALCIFEROL 1.25 MG/1
50000 CAPSULE ORAL
Qty: 12 CAP | Refills: 4 | Status: SHIPPED | OUTPATIENT
Start: 2020-01-10 | End: 2021-02-13 | Stop reason: SDUPTHER

## 2020-01-10 NOTE — PROGRESS NOTES
The results were reviewed. False positive Hepatitis Be Antigen. Mild anemia. Elevated inflammatory markers (ALK, ESR, CRP). Vitamin D is low at 21.1 --> I'll prescribe ergocalciferol 50,000 weekly. All remaining labs are normal/negative.

## 2020-01-18 DIAGNOSIS — M05.79 SEROPOSITIVE RHEUMATOID ARTHRITIS OF MULTIPLE SITES (HCC): ICD-10-CM

## 2020-01-21 ENCOUNTER — DOCUMENTATION ONLY (OUTPATIENT)
Dept: RHEUMATOLOGY | Age: 60
End: 2020-01-21

## 2020-01-22 ENCOUNTER — DOCUMENTATION ONLY (OUTPATIENT)
Dept: RHEUMATOLOGY | Age: 60
End: 2020-01-22

## 2020-01-22 NOTE — PROGRESS NOTES
Pt assistance forms for [facility] mailed to pt.   Medication has high copay $153.88

## 2020-02-04 ENCOUNTER — DOCUMENTATION ONLY (OUTPATIENT)
Dept: PHARMACY | Age: 60
End: 2020-02-04

## 2020-02-11 ENCOUNTER — DOCUMENTATION ONLY (OUTPATIENT)
Dept: RHEUMATOLOGY | Age: 60
End: 2020-02-11

## 2020-03-01 ENCOUNTER — PATIENT MESSAGE (OUTPATIENT)
Dept: FAMILY MEDICINE CLINIC | Age: 60
End: 2020-03-01

## 2020-03-02 RX ORDER — CETIRIZINE HCL 10 MG
10 TABLET ORAL DAILY
Qty: 90 TAB | Refills: 3 | Status: SHIPPED | OUTPATIENT
Start: 2020-03-02 | End: 2020-04-02

## 2020-03-23 ENCOUNTER — TELEPHONE (OUTPATIENT)
Dept: FAMILY MEDICINE CLINIC | Age: 60
End: 2020-03-23

## 2020-03-23 DIAGNOSIS — M05.79 SEROPOSITIVE RHEUMATOID ARTHRITIS OF MULTIPLE SITES (HCC): ICD-10-CM

## 2020-03-23 NOTE — TELEPHONE ENCOUNTER
Incoming coming level one call from [email or web address] for nosebleeds. Patients name and  verified. Patient reported she has been having nosebleeds when she blows her nose, congestion, cough, sore throat, and headaches. Patient reported this has been ongoing for months. Advised patient that we are doing a flu clinic at our office. Patient given information and directions of flu clinic. Advised patient could try an over the counter nasal spray to help with nasal dryness. Patient reported understanding and appreciative of call.  Patient advised that if symptoms worse or nosebleeds become uncontrollable to go to urgent care or ER

## 2020-03-24 RX ORDER — METHOTREXATE 2.5 MG/1
20 TABLET ORAL
Qty: 96 TAB | Refills: 0 | Status: SHIPPED | OUTPATIENT
Start: 2020-03-30 | End: 2020-04-02 | Stop reason: SDUPTHER

## 2020-04-02 ENCOUNTER — OFFICE VISIT (OUTPATIENT)
Dept: RHEUMATOLOGY | Age: 60
End: 2020-04-02

## 2020-04-02 VITALS
SYSTOLIC BLOOD PRESSURE: 143 MMHG | WEIGHT: 199 LBS | HEIGHT: 62 IN | DIASTOLIC BLOOD PRESSURE: 89 MMHG | TEMPERATURE: 97.8 F | HEART RATE: 75 BPM | BODY MASS INDEX: 36.62 KG/M2 | RESPIRATION RATE: 18 BRPM

## 2020-04-02 DIAGNOSIS — E55.9 VITAMIN D DEFICIENCY: ICD-10-CM

## 2020-04-02 DIAGNOSIS — Z79.60 LONG-TERM USE OF IMMUNOSUPPRESSANT MEDICATION: ICD-10-CM

## 2020-04-02 DIAGNOSIS — M25.511 ACUTE PAIN OF RIGHT SHOULDER: ICD-10-CM

## 2020-04-02 DIAGNOSIS — M05.79 SEROPOSITIVE RHEUMATOID ARTHRITIS OF MULTIPLE SITES (HCC): Primary | ICD-10-CM

## 2020-04-02 RX ORDER — TRIAMCINOLONE ACETONIDE 40 MG/ML
40 INJECTION, SUSPENSION INTRA-ARTICULAR; INTRAMUSCULAR ONCE
Qty: 1 ML | Refills: 0
Start: 2020-04-02 | End: 2020-04-02

## 2020-04-02 RX ORDER — LIDOCAINE HYDROCHLORIDE 10 MG/ML
1 INJECTION, SOLUTION EPIDURAL; INFILTRATION; INTRACAUDAL; PERINEURAL ONCE
Qty: 1 ML | Refills: 0
Start: 2020-04-02 | End: 2020-04-02

## 2020-04-02 RX ORDER — IBUPROFEN 600 MG/1
TABLET ORAL
COMMUNITY
End: 2020-07-07

## 2020-04-02 RX ORDER — IBUPROFEN 800 MG/1
800 TABLET ORAL
Qty: 14 TAB | Refills: 0 | Status: SHIPPED | OUTPATIENT
Start: 2020-04-02 | End: 2020-07-07

## 2020-04-02 RX ORDER — FOLIC ACID 1 MG/1
1 TABLET ORAL DAILY
Qty: 90 TAB | Refills: 0 | Status: SHIPPED | OUTPATIENT
Start: 2020-04-02 | End: 2020-07-01 | Stop reason: SDUPTHER

## 2020-04-02 RX ORDER — METHOTREXATE 2.5 MG/1
20 TABLET ORAL
Qty: 96 TAB | Refills: 0 | Status: SHIPPED | OUTPATIENT
Start: 2020-04-06 | End: 2020-07-01 | Stop reason: SDUPTHER

## 2020-04-02 NOTE — PROGRESS NOTES
Chief Complaint   Patient presents with    Joint Pain     1. Have you been to the ER, urgent care clinic since your last visit? Hospitalized since your last visit? No    2. Have you seen or consulted any other health care providers outside of the [address] [place] [address] since your last visit? Include any pap smears or colon screening.  No

## 2020-04-02 NOTE — LETTER
4/2/20 Patient: Estephanie Duque YOB: 1960 Date of Visit: 4/2/2020 Madison Sutton MD 
[address] [facility] [address] [phone] VIA In Basket Dear Madison Sutton MD, Thank you for referring Ms. Estephanie Duque to [address] [place] [address] for evaluation. My notes for this consultation are attached. If you have questions, please do not hesitate to call me. I look forward to following your patient along with you.  
 
 
Sincerely, 
 
Madison Cadena MD

## 2020-04-02 NOTE — PROGRESS NOTES
REASON FOR VISIT    This is an acute follow-up visit for Ms. Roe for    ICD-10-CM   1. Seropositive rheumatoid arthritis of multiple sites ([facility] M05.79     Inflammatory arthritis phenotype includes:  Anti-CCP positive: yes (>250)  Rheumatoid factor positive: yes (51.6)  Erosive disease: no  Extra-articular manifestations include: bibasilar scarring    Immunosuppression Screening (1/07/2020):  Quantiferon TB: indeterminate  PPD:  Negative (1/10/2018)  Hepatitis B: negative  Hepatitis C: negative    Therapy History includes:  Current DMARD therapy includes: methotrexate 20 mg every Monday, Xeljanz 11 mg XR (3/27/2018 to 12/2019)  Prior DMARD therapy includes: Enbrel (6 weeks)  The following DMARDs have been ineffective: none  The following DMARDs were stopped because of side effects: Enbrel (injection site reaction, increased cough, other unknown)    Immunizations:   Immunization History   Administered Date(s) Administered    TB Skin Test (PPD) Intradermal 01/08/2018, 01/08/2018     Active problems include:    Patient Active Problem List   Diagnosis Code    Seropositive rheumatoid arthritis of multiple sites ([facility] M05.79    Anemia D64.9    Long-term use of immunosuppressant medication Z79.899    Primary osteoarthritis of both knees M17.0    Vitamin D deficiency E55.9    Severe obesity (BMI 35.0-39. 9) E66.01    Greater trochanteric bursitis of right hip M70.61    Iliotibial band syndrome of right side M76.31       HISTORY OF PRESENT ILLNESS    Ms. Deondre Sarmiento returns for an acute follow-up. On her last visit, I continued methotrexate 20 mg every Monday and Xeljanz 11 mg XR daily with good tolerance. She messaged me on 4/01/2020 complaining of acute right shoulder pain. She thinks is from stress but she walks her 75 lb dog. After her a previous shoulder injection, she had pain for about 2-3 days so is reluctant to have a shot today.  She has been taking Motrin every 6 hours which has been irritating her stomach. She denies fever, weight loss, blurred vision, vision loss, oral ulcers, ankle swelling, dry cough, dyspnea, nausea, vomiting, dysphagia, abdominal pain, black or bloody stool, fall since last visit, rash, easy bruising and increased thirst.    Ms. Marisue Seip has continued her medications for arthritis and reports good tolerance without significant side effects. Last toxicity monitoring by blood work was done on 1/07/2020 and did not reveal any significant adverse effects, except Hct 33.4%,     Most recent inflammatory markers from 1/07/2020 revealed a ESR 56 mm/hr and CRP 17 mg/L. The patient has not had any interval hospital admissions, infections, or surgeries. REVIEW OF SYSTEMS    A comprehensive review of systems was performed and pertinent results are documented in the HPI, review of systems is otherwise non-contributory. PAST MEDICAL HISTORY    She has a past medical history of Anemia, Reactive airway disease, and Rheumatoid arthritis ([facility] (10/24/2017). FAMILY HISTORY    Her family history includes Heart Disease in her father; High Cholesterol in her mother; Thyroid Disease in her mother. SOCIAL HISTORY    She reports that she quit smoking about 8 years ago. She has never used smokeless tobacco. She reports that she does not drink alcohol or use drugs. MEDICATIONS    Current Outpatient Medications   Medication Sig Dispense Refill    triamcinolone acetonide (Kenalog) 40 mg/mL injection 1 mL by Intra artICUlar route once for 1 dose. 1 mL 0    lidocaine, PF, (XYLOCAINE) 10 mg/mL (1 %) injection 1 mL by Intra artICUlar route once for 1 dose. 1 mL 0    ibuprofen (MOTRIN) 600 mg tablet Take  by mouth every six (6) hours as needed for Pain.  [START ON 4/6/2020] methotrexate (RHEUMATREX) 2.5 mg tablet Take 8 Tabs by mouth every Monday. 96 Tab 0    folic acid (FOLVITE) 1 mg tablet Take 1 Tab by mouth daily.  Indications: does not take on Mondays 90 Tab 0    ibuprofen (MOTRIN) 800 mg tablet Take 1 Tab by mouth every eight (8) hours as needed for Pain (take with food). 14 Tab 0    tofacitinib (XELJANZ XR) 11 mg Tb24 Take 11 mg by mouth daily. 30 Tab 11    ergocalciferol (ERGOCALCIFEROL) 50,000 unit capsule Take 1 Cap by mouth every seven (7) days. Indications: Vitamin D Deficiency (High Dose Therapy) 12 Cap 4    fluticasone propionate (FLONASE) 50 mcg/actuation nasal spray 2 Sprays by Both Nostrils route daily. (Patient taking differently: 2 Sprays by Both Nostrils route as needed.) 3 Bottle 0    polyethylene glycol (MIRALAX) 17 gram/dose powder Take 17 g by mouth daily as needed (constipation). Prn constipation 289 g 0    Ibuprofen-diphenhydrAMINE (ADVIL PM) 200-38 mg tab Take 200 mg by mouth as needed.  cetirizine (ZYRTEC) 10 mg tablet Take 1 Tab by mouth daily. 90 Tab 3        ALLERGIES    No Known Allergies    PHYSICAL EXAMINATION    Visit Vitals  /89   Pulse 75   Temp 97.8 °F (36.6 °C)   Resp 18   Ht 5' 2\" (1.575 m)   Wt 199 lb (90.3 kg)   BMI 36.40 kg/m²     Body mass index is 36.4 kg/m². General: Patient is alert, oriented x 3, not in acute distress    HEENT:   Sclerae are not injected and appear moist.  There is no alopecia. Neck is supple     Chest:  Breathing comfortably comfortably at room air    Neurological exam:  Muscle strength is full in upper and lower extremities     Skin exam:    Exam deferred    There are no rashes, no alopecia, no discoid lesions, no active Raynaud's, no livedo reticularis, no periungual erythema. Blackened toe nails    Musculoskeletal exam:  A comprehensive musculoskeletal exam was NOT performed for all joints of each upper and lower extremity and assessed for swelling, tenderness and range of motion.  Positive results are documented as below:    Focused right shoulder exam revealed tenderness anteriorly and posteriorly to glenohumeral joint with decreased active ROM due to pain    Previous Exam    Right trochanteric tenderness  Right iliotibial band tenderness  Bilateral knee crepitus without effusion with hyperextension    Z-Deformities:   no  [place] Neck Deformities:  no  Boutonierre's Deformities:  no  Ulnar Deviation:   no  MCP Subluxation:  no     Joint Count 1/7/2020 5/24/2019 11/6/2018 8/6/2018 5/1/2018 3/27/2018 12/27/2017   Patient pain (0-100) 40 60 10 85 10 25 25   MHAQ 0 0.875 0 0.25 0 0.25 0.125   Left wrist- Tender - - - - - 1 -   Left wrist- Swollen - - - - 1 1 -   Left 1st MCP - Swollen - - - - 1 1 -   Left 2nd PIP - Tender - - - - 1 - -   Left 2nd PIP - Swollen - - - - 1 - -   Left 3rd PIP - Tender - - - 0 1 - -   Left 3rd PIP - Swollen - - - 1 1 - -   Left 4th PIP - Tender - - - - 1 - -   Left 5th PIP - Tender - - - - 1 - -   Right shoulder - Tender 1 - 1 - - - -   Right shoulder - Swollen 0 - 0 - - - -   Right elbow - Tender - - - 1 - - 1   Right elbow - Swollen - - - 1 1 1 1   Right wrist- Tender - - - - 1 1 -   Right wrist- Swollen - - - - 1 1 -   Right 1st MCP - Swollen - - - - 1 - -   Right 3rd MCP - Swollen - - - 1 1 1 -   Right 4th MCP - Tender - - - - 1 - -   Right 4th MCP - Swollen - - - 1 - 1 -   Right 2nd PIP - Swollen - - - - - 1 -   Right 3rd PIP - Tender - 1 - 1 1 - -   Right 3rd PIP - Swollen - 0 - 1 1 1 -   Right 5th PIP - Tender - - - - 1 - -   Right 5th PIP - Swollen - - - - 1 - -   Tender Joint Count (Total) 1 1 1 2 8 2 1   Swollen Joint Count (Total) 0 0 0 5 10 8 1   Physician Assessment (0-10) 0.5 0.5 0 3 3 3 1   Patient Assessment (0-10) 2 1 1 5.5 2.5 1.5 3   CDAI Total (calculated) 3.5 2.5 2 15.5 23.5 14.5 6       DATA REVIEW    Laboratory     Recent laboratory results were reviewed, summarized, and discussed with the patient. Imaging    Musculoskeletal Ultrasound    Ultrasound of the right elbow. Indication: joint pain. (12/27/17)  Using a Viddyad e with 12 Mhz probe, limited views of the right elbow were reviewed.  This revealed hypoechoic dopplerable collection and anechoic collection within the olecranon fossa. The tendons were normal. Bony contours were regular without erosions seen. There were no soft tissue masses noted. Impression: right elbow synovitis with effusion    Radiographs    Chest 3/27/2018: clear lungs. There is a small calcification or bone island overlying the left costophrenic angle. Heart size is normal. There is no pulmonary edema. There is no evident pneumothorax, adenopathy or effusion. Bilateral Hand 12/27/2017: mild diffuse periarticular osteopenia. The joint space widths are normal. No erosive changes are shown. There is anatomic alignment. No pathological soft tissue calcifications or demonstrated. No localized areas of soft tissue swelling is shown. Right Elbow 12/27/2017: mild diffuse osteopenia. There is a small elbow effusion. There is mild to moderate uniform joint space narrowing which is greatest between the capitellum and radial head. No fracture is shown. No erosive changes are evident. Bilateral Foot 12/27/2017: mild diffuse periarticular osteopenia. Minimal joint space widths are demonstrated bilaterally. There are no erosive changes. No pathologic soft tissue calcifications or localized areas of soft tissue swelling are demonstrated. There is anatomic alignment. Deformity of the distal shaft of the left fifth metatarsal bone is shown suggesting remote, healed fracture. There is no evidence for acute fracture. CT Imaging    CT Chest without contrast 4/03/2018: The thyroid gland reveals no nodules. There is no axillary adenopathy. There are no enlarged mediastinal or hilar lymph nodes. There are no pleural or  ericardial effusions. No adrenal lesions. The lungs are well aerated. The central airways are patent. There are minimal linear areas of scarring at each lung base. No pneumonia. No pulmonary edema no evidence for fibrosis. Review of bone windows reveals no destructive lesions.     MR Imaging    None    DXA    None    PFT    PFT 4/03/2018: FVC of 2.32 (90%), FEV1 of 1.73 (91%), FEV1/FVC of 74.8% and a DLCO of 15.11 (>80%), TLC 3.88 (96%), VC 2.28 (81%). PATHOLOGY    Kenalog 80 mg IM. (08/06/18)   Ultrasound Guided Right Elbow Kenalog 40 mg IA. (12/27/17)    PROCEDURE     [facility]  OFFICE PROCEDURE PROGRESS NOTE      Symptom relief from Right Shoulder Arthritis. (04/02/20)     Chart reviewed for the following:   Arabella Hutchison MD, have reviewed the History, Physical and updated the Allergic reactions for Joanie Cortes Jean-BaptisteMaycol     TIME OUT performed immediately prior to start of procedure:   I, Le Hansen MD, have performed the following reviews on [address] [place] [address] prior to the start of the procedure            * Patient was identified by name and date of birth   * Agreement on procedure being performed was verified  * Risks and Benefits explained to the patient  * Procedure site verified and marked as necessary  * Patient was positioned for comfort  * Consent was signed and verified     Time: 10:19 AM    Date of procedure: 4/2/2020    Procedure performed by: Le Hansen MD    Provider assisted by: self    Patient assisted by: self    How tolerated by patient: tolerated the procedure well with no complications    Post Procedural Pain Scale: 4 down from 9    Comments: none    Indications:   Symptom relief from Right Shoulder. (04/02/20)     Procedure:   After consent was obtained, and timeout performed, using sterile technique the Right shoulder joint was prepped using Chlorprep. Local anesthetic used: Ethyl Chloride. The joint was entered and 0 ml's of fluid was withdrawn. Kenalog 40 mg was mixed with 1% lidocaine 1 ml and injected into the joint and the needle withdrawn. The procedure was well tolerated. The patient was asked to continue to rest the joint for a few more days before resuming regular activities.  It may be more painful for the first 1-2 days. Watch for fever, or increased swelling or persistent pain in the joint. Call or return to clinic as needed if such symptoms occur or there is failure to improve as anticipated. ASSESSMENT AND PLAN    This is an acute follow-up visit for Ms. Roe. 1) Seropositive Rheumatoid Arthritis. She is maintained on methotrexate 20 mg every Monday and Xeljanz 11 mg XR with good tolerance. Her CDAI was previously 3.5 (previously 2.5, 2, 15.5, 23.5, 14.5, 6) with 1 tender and 0 swollen joints, consistent with low disease activity. She presents today for acute right shoulder pain that has not improved with Motrin. She has been walking her 75 lb dog but does not know if that aggravated it. I injected her right shoulder with good tolerance. I prescribed Motrin 800 mg to take as needed with good. 2) Long Term Use of Immunosuppressants. The patient remains on immunomodulatory medications (methotrexate, Lindy Failing) and requires frequent toxicity monitoring by blood work. 3) Bilateral Knee Osteoarthritis. This was not an active issue today. 4) Dyspnea. This is chronic and she attributes to allergies. She was evaluated by pulmonology in the past and was told she had scar tissue in her left lung. Exam revealed bibasilar crackles. CT Chest showed bibasilar crackles without concerning findings for interstitial lung disease. PFT showed mild small airway obstruction. She was instructed to follow up with pulmonology. This is not an active issue today. 5) Vitamin D Deficiency. Her level was 21.1 (previously 32.8, 21.3). She is on ergocalciferol weekly. 6) Right Trochanteric Bursitis with Right Iliotibial Band Syndrome. I had referred her to physical therapy. The patient voiced understanding of the aforementioned assessment and plan. Summary of plan was provided in the After Visit Summary patient instructions.      TODAY'S ORDERS    Orders Placed This Encounter    TRIAMCINOLONE ACETONIDE INJ    20610 - DRAIN/INJECT LARGE JOINT/BURSA    triamcinolone acetonide (Kenalog) 40 mg/mL injection    lidocaine, PF, (XYLOCAINE) 10 mg/mL (1 %) injection    ibuprofen (MOTRIN) 600 mg tablet    methotrexate (RHEUMATREX) 2.5 mg tablet    folic acid (FOLVITE) 1 mg tablet    ibuprofen (MOTRIN) 800 mg tablet     Future Appointments   Date Time Provider Yolanda Val   7/7/2020 10:20 AM MD Santos Levin MD, [address] [facility] [place]    Adult Rheumatology   Rheumatology Ultrasound Certified  [facility]  A Part of [facility], [address] [place] [address]   Phone [phone]  Fax [phone]

## 2020-04-07 ENCOUNTER — TELEPHONE (OUTPATIENT)
Dept: FAMILY MEDICINE CLINIC | Age: 60
End: 2020-04-07

## 2020-04-07 NOTE — TELEPHONE ENCOUNTER
Outbound call to patient. Patient states that she is not sure of what chemical she was exposed to. Patient requesting some blood testing. Asked patient what type of blood testing that she was looking to have. Patient unsure and just wants to schedule a physical appointment at a later date. Nurse verbalized understanding, and appointment scheduled.

## 2020-04-07 NOTE — TELEPHONE ENCOUNTER
----- Message from Mack Alexander sent at 4/7/2020 11:23 AM EDT -----  Regarding: Dr. Rivera/Telephone        Patient returned call from the practice today. Please call patient back. Best contact number is [phone].

## 2020-04-14 ENCOUNTER — VIRTUAL VISIT (OUTPATIENT)
Dept: FAMILY MEDICINE CLINIC | Age: 60
End: 2020-04-14

## 2020-04-14 DIAGNOSIS — R21 RASH: Primary | ICD-10-CM

## 2020-04-14 DIAGNOSIS — Z91.09 ENVIRONMENTAL ALLERGIES: ICD-10-CM

## 2020-04-14 RX ORDER — CETIRIZINE HCL 10 MG
10 TABLET ORAL DAILY
Qty: 90 TAB | Refills: 1 | Status: SHIPPED | OUTPATIENT
Start: 2020-04-14 | End: 2020-09-09 | Stop reason: SDUPTHER

## 2020-04-14 RX ORDER — CLOTRIMAZOLE AND BETAMETHASONE DIPROPIONATE 10; .64 MG/G; MG/G
CREAM TOPICAL 2 TIMES DAILY
Qty: 15 G | Refills: 0 | Status: SHIPPED | OUTPATIENT
Start: 2020-04-14 | End: 2020-06-12 | Stop reason: SDUPTHER

## 2020-04-14 NOTE — PROGRESS NOTES
[facility] Note  Subjective:      Tamiko Robbins is a 61 y.o. female who presents for an acute visit with the following chief complaints. Chief Complaint   Patient presents with    Skin Problem     itchy rash on lower abdomen has started again   last time she was told it was fungal and was given a cream.  has been using neosporin this time without improvement.  Medication Refill     needs refill of Zyrtec     I was in the office while conducting this encounter. Consent:  She and/or her healthcare decision maker is aware that this patient-initiated Telehealth encounter is a billable service, with coverage as determined by her insurance carrier. She is aware that she may receive a bill and has provided verbal consent to proceed: Yes    This virtual visit was conducted telephone encounter only. -  I affirm this is a Patient Initiated Episode with an Established Patient who has not had a related appointment within my department in the past 7 days or scheduled within the next 24 hours. Note: this encounter is not billable if this call serves to triage the patient into an appointment for the relevant concern. Total Time: minutes: 5-10 minutes. Rash  Patient complains of rash involving the lower abdomen. Rash started a few weeks ago, worsening since onset. Appearance of rash at onset: little blotchy redness, located in lower abdomen but above pelvis. Rash has grown in size. Discomfort associated with rash: itching. Associated symptoms: no associated symptoms, otherwise in good health. Denies: no associated symptoms. Patient has not had previous evaluation of rash. Patient has had previous treatment. Response to treatment: neosporin was a little helpful, topical steroid with no relief. Patient has not had contacts with similar rash. Patient has not identified precipitant.  Patient has not had new exposures (soaps, lotions, laundry detergents, foods, medications, plants, insects or animals. ). History of similar rash several years ago and was treated successfully with fungal cream.     History of allergies. Needs refill of Zyrtec. Current Outpatient Medications   Medication Sig Dispense Refill    cetirizine (ZYRTEC) 10 mg tablet Take 1 Tab by mouth daily. 90 Tab 1    clotrimazole-betamethasone (LOTRISONE) topical cream Apply  to affected area two (2) times a day. Apply to rash of abdomen. Do not exceed 2 weeks. 15 g 0    ibuprofen (MOTRIN) 600 mg tablet Take  by mouth every six (6) hours as needed for Pain.  methotrexate (RHEUMATREX) 2.5 mg tablet Take 8 Tabs by mouth every Monday. 96 Tab 0    folic acid (FOLVITE) 1 mg tablet Take 1 Tab by mouth daily. Indications: does not take on Mondays 90 Tab 0    ibuprofen (MOTRIN) 800 mg tablet Take 1 Tab by mouth every eight (8) hours as needed for Pain (take with food). 14 Tab 0    tofacitinib (XELJANZ XR) 11 mg Tb24 Take 11 mg by mouth daily. 30 Tab 11    ergocalciferol (ERGOCALCIFEROL) 50,000 unit capsule Take 1 Cap by mouth every seven (7) days. Indications: Vitamin D Deficiency (High Dose Therapy) 12 Cap 4    fluticasone propionate (FLONASE) 50 mcg/actuation nasal spray 2 Sprays by Both Nostrils route daily. (Patient taking differently: 2 Sprays by Both Nostrils route as needed.) 3 Bottle 0    polyethylene glycol (MIRALAX) 17 gram/dose powder Take 17 g by mouth daily as needed (constipation). Prn constipation 289 g 0    Ibuprofen-diphenhydrAMINE (ADVIL PM) 200-38 mg tab Take 200 mg by mouth as needed. No Known Allergies    ROS:   Complete review of systems was reviewed with pertinent information listed in HPI. Review of Systems   Constitutional: Negative for chills, diaphoresis, fever, malaise/fatigue and weight loss. HENT: Negative for congestion and sore throat. Respiratory: Negative for cough and shortness of breath. Cardiovascular: Negative for chest pain and palpitations.    Gastrointestinal: Negative for abdominal pain, nausea and vomiting. Genitourinary: Negative for dysuria. Musculoskeletal: Negative for joint pain and myalgias. Skin: Positive for itching and rash. Neurological: Negative for dizziness and headaches. Endo/Heme/Allergies: Positive for environmental allergies. Objective: There were no vitals taken for this visit. Vitals and Nurse Documentation reviewed. Physical Exam  Neurological:      Mental Status: She is alert and oriented to person, place, and time. Assessment/Plan:     Diagnoses and all orders for this visit:    1. Rash:Acute recurrent rash of lower abdomen. Exam limited due to telephone encounter. Sounds consistent with possible fungal dermatitis. Start Lotrisone cream. Advised to call if no improvement in 1 week. Daily skin care reviewed. -     clotrimazole-betamethasone (LOTRISONE) topical cream; Apply  to affected area two (2) times a day. Apply to rash of abdomen. Do not exceed 2 weeks. 2. Environmental allergies: Restart oral antihistamine. On intranasal sterids. -     cetirizine (ZYRTEC) 10 mg tablet; Take 1 Tab by mouth daily. Follow-up and Dispositions    · Return if symptoms worsen or fail to improve.        Discussed expected course/resolution/complications of diagnosis in detail with patient.    Medication risks/benefits/costs/interactions/alternatives discussed with patient.    Pt expressed understanding with the diagnosis and plan

## 2020-06-01 ENCOUNTER — TELEPHONE (OUTPATIENT)
Dept: FAMILY MEDICINE CLINIC | Age: 60
End: 2020-06-01

## 2020-06-01 NOTE — TELEPHONE ENCOUNTER
Outbound call to patient no answer left a message for a return call.     Please reschedule appointment on 6/3 with Dr Sapphire Avalos for anytime after 7/10

## 2020-06-12 DIAGNOSIS — R21 RASH: ICD-10-CM

## 2020-06-12 RX ORDER — CLOTRIMAZOLE AND BETAMETHASONE DIPROPIONATE 10; .64 MG/G; MG/G
CREAM TOPICAL 2 TIMES DAILY
Qty: 15 G | Refills: 0 | Status: SHIPPED | OUTPATIENT
Start: 2020-06-12 | End: 2020-07-01 | Stop reason: SDUPTHER

## 2020-06-12 NOTE — TELEPHONE ENCOUNTER
Last visit 04/14/2020 Virtual visit NP Miles Walker   Next appointment 07/13/2020 Elenita Nj   Previous refill encounter(s) 04/14/2020 Lotrisone #15 grams     Requested Prescriptions     Pending Prescriptions Disp Refills    clotrimazole-betamethasone (LOTRISONE) topical cream 15 g 0     Sig: Apply  to affected area two (2) times a day. Apply to rash of abdomen. Do not exceed 2 weeks.

## 2020-07-01 DIAGNOSIS — R21 RASH: ICD-10-CM

## 2020-07-06 RX ORDER — CLOTRIMAZOLE AND BETAMETHASONE DIPROPIONATE 10; .64 MG/G; MG/G
CREAM TOPICAL 2 TIMES DAILY
Qty: 15 G | Refills: 0 | Status: SHIPPED | OUTPATIENT
Start: 2020-07-06 | End: 2020-10-07

## 2020-07-07 ENCOUNTER — OFFICE VISIT (OUTPATIENT)
Dept: RHEUMATOLOGY | Age: 60
End: 2020-07-07

## 2020-07-07 VITALS
DIASTOLIC BLOOD PRESSURE: 94 MMHG | RESPIRATION RATE: 18 BRPM | BODY MASS INDEX: 38.46 KG/M2 | HEIGHT: 62 IN | WEIGHT: 209 LBS | SYSTOLIC BLOOD PRESSURE: 151 MMHG | TEMPERATURE: 97.4 F | HEART RATE: 86 BPM

## 2020-07-07 DIAGNOSIS — Z79.60 LONG-TERM USE OF IMMUNOSUPPRESSANT MEDICATION: ICD-10-CM

## 2020-07-07 DIAGNOSIS — M05.79 SEROPOSITIVE RHEUMATOID ARTHRITIS OF MULTIPLE SITES (HCC): Primary | ICD-10-CM

## 2020-07-07 DIAGNOSIS — R05.9 COUGH IN ADULT: ICD-10-CM

## 2020-07-07 DIAGNOSIS — G89.29 CHRONIC RIGHT SHOULDER PAIN: ICD-10-CM

## 2020-07-07 DIAGNOSIS — M25.511 CHRONIC RIGHT SHOULDER PAIN: ICD-10-CM

## 2020-07-07 DIAGNOSIS — E55.9 VITAMIN D DEFICIENCY: ICD-10-CM

## 2020-07-07 RX ORDER — SYRINGE-NEEDLE,INSULIN,0.5 ML 30 GX5/16"
1 SYRINGE, EMPTY DISPOSABLE MISCELLANEOUS
Qty: 12 SYRINGE | Refills: 3 | Status: SHIPPED | OUTPATIENT
Start: 2020-07-11 | End: 2020-09-27

## 2020-07-07 RX ORDER — METHOTREXATE 25 MG/ML
25 INJECTION, SOLUTION INTRA-ARTERIAL; INTRAMUSCULAR; INTRAVENOUS
Qty: 12 ML | Refills: 0 | Status: SHIPPED | OUTPATIENT
Start: 2020-07-13 | End: 2020-10-20 | Stop reason: SDUPTHER

## 2020-07-07 NOTE — PROGRESS NOTES
REASON FOR VISIT    This is a follow-up visit for Ms. Roe for    ICD-10-CM   1. Seropositive rheumatoid arthritis of multiple sites ([facility].) M05.79     Inflammatory arthritis phenotype includes:  Anti-CCP positive: yes (>250)  Rheumatoid factor positive: yes (51.6)  Erosive disease: no  Extra-articular manifestations include: bibasilar scarring    Immunosuppression Screening (1/07/2020):  Quantiferon TB: indeterminate  PPD:  Negative (1/10/2018)  Hepatitis B: negative  Hepatitis C: negative    Therapy History includes:  Current DMARD therapy includes: methotrexate 20 mg every Monday, Xeljanz 11 mg XR (3/27/2018 to 12/2019)  Prior DMARD therapy includes: Enbrel (6 weeks)  The following DMARDs have been ineffective: none  The following DMARDs were stopped because of side effects: Enbrel (injection site reaction, increased cough, other unknown)    Immunizations:   Immunization History   Administered Date(s) Administered    TB Skin Test (PPD) Intradermal 01/08/2018, 01/08/2018     Active problems include:    Patient Active Problem List   Diagnosis Code    Seropositive rheumatoid arthritis of multiple sites ([facility] M05.79    Anemia D64.9    Long-term use of immunosuppressant medication Z79.899    Primary osteoarthritis of both knees M17.0    Vitamin D deficiency E55.9    Severe obesity (BMI 35.0-39. 9) E66.01    Greater trochanteric bursitis of right hip M70.61    Iliotibial band syndrome of right side M76.31     HISTORY OF PRESENT ILLNESS    Ms. Karo Dorsey returns for a follow-up. On her last visit, I continued methotrexate 20 mg every Monday and Xeljanz 11 mg XR daily with good tolerance. I injected her right shoulder with good tolerance and improvement. Today, she feels ok. She has swelling in her hands in the morning that improves as the day goes on associated with stiffness lasting a couple of hours without pain. The stiffness has been going on since her last visit.  She now has right arm pain that occurs when she uses her computer but not in AM or PM.    She endorses ankle swelling in the morning for the past months. She also has an intermittent dry cough over the past 8 months. She denies dyspnea, heart burn or reflux. easy bruising. She denies fever, weight loss, blurred vision, vision loss, oral ulcers, dyspnea, nausea, vomiting, dysphagia, abdominal pain, black or bloody stool, fall since last visit, rash and increased thirst.    Last toxicity monitoring by blood work was done on 1/07/2020 and did not reveal any significant adverse effects, except Hct 33.4%,     Most recent inflammatory markers from 1/07/2020 revealed a ESR 56 mm/hr and CRP 17 mg/L. The patient has not had any interval hospital admissions, infections, or surgeries. REVIEW OF SYSTEMS    A comprehensive review of systems was performed and pertinent results are documented in the HPI, review of systems is otherwise non-contributory. PAST MEDICAL HISTORY    She has a past medical history of Anemia, Reactive airway disease, and Rheumatoid arthritis ([facility] (10/24/2017). FAMILY HISTORY    Her family history includes Heart Disease in her father; High Cholesterol in her mother; Thyroid Disease in her mother. SOCIAL HISTORY    She reports that she quit smoking about 8 years ago. She has never used smokeless tobacco. She reports that she does not drink alcohol or use drugs. MEDICATIONS    Current Outpatient Medications   Medication Sig Dispense Refill    [START ON 7/13/2020] methotrexate 25 mg/mL chemo injection 1 mL by SubCUTAneous route every Monday. 12 mL 0    [START ON 7/11/2020] Insulin Syringe-Needle U-100 1 mL 30 gauge x 5/16 syrg 1 Each by Does Not Apply route Every Saturday for 12 doses. To be used for methotrexate solution 12 Syringe 3    clotrimazole-betamethasone (LOTRISONE) topical cream Apply  to affected area two (2) times a day. Apply to rash of abdomen. Do not exceed 2 weeks.  15 g 0    folic acid (FOLVITE) 1 mg tablet Take 1 Tab by mouth daily. 90 Tab 0    cetirizine (ZYRTEC) 10 mg tablet Take 1 Tab by mouth daily. 90 Tab 1    tofacitinib (XELJANZ XR) 11 mg Tb24 Take 11 mg by mouth daily. 30 Tab 11    ergocalciferol (ERGOCALCIFEROL) 50,000 unit capsule Take 1 Cap by mouth every seven (7) days. Indications: Vitamin D Deficiency (High Dose Therapy) 12 Cap 4    polyethylene glycol (MIRALAX) 17 gram/dose powder Take 17 g by mouth daily as needed (constipation). Prn constipation 289 g 0    Ibuprofen-diphenhydrAMINE (ADVIL PM) 200-38 mg tab Take 200 mg by mouth as needed. ALLERGIES    No Known Allergies    PHYSICAL EXAMINATION    Visit Vitals  BP (!) 151/94   Pulse 86   Temp 97.4 °F (36.3 °C)   Resp 18   Ht 5' 2\" (1.575 m)   Wt 209 lb (94.8 kg)   BMI 38.23 kg/m²     Body mass index is 38.23 kg/m². General: Patient is alert, oriented x 3, not in acute distress    HEENT:   Sclerae are not injected and appear moist.  There is no alopecia. Neck is supple     Chest:  Breathing comfortably comfortably at room air    Neurological exam:  Muscle strength is full in upper and lower extremities     Extremities:  Free of clubbing, cyanosis, +1 non-pitting edema involving the legs and ankle    Skin exam:    There are no rashes, no alopecia, no discoid lesions, no active Raynaud's, no livedo reticularis, no periungual erythema. Musculoskeletal exam:  A comprehensive musculoskeletal exam was performed for all joints of each upper and lower extremity and assessed for swelling, tenderness and range of motion.  Positive results are documented as below:    Right biceps tenderness    Right trochanteric tenderness  Right iliotibial band tenderness  Bilateral knee crepitus without effusion with hyperextension    Z-Deformities:   no  [place] Neck Deformities:  no  Boutonierre's Deformities:  no  Ulnar Deviation:   no  MCP Subluxation:  no     Joint Count 7/7/2020 1/7/2020 5/24/2019 11/6/2018 8/6/2018 5/1/2018 3/27/2018 Patient pain (0-100) 40 40 60 10 85 10 25   MHAQ 1 0 0.875 0 0.25 0 0.25   Left wrist- Tender - - - - - - 1   Left wrist- Swollen - - - - - 1 1   Left 1st MCP - Swollen - - - - - 1 1   Left 2nd PIP - Tender 1 - - - - 1 -   Left 2nd PIP - Swollen 0 - - - - 1 -   Left 3rd PIP - Tender 1 - - - 0 1 -   Left 3rd PIP - Swollen 0 - - - 1 1 -   Left 4th PIP - Tender 1 - - - - 1 -   Left 4th PIP - Swollen 0 - - - - - -   Left 5th PIP - Tender 1 - - - - 1 -   Left 5th PIP - Swollen 0 - - - - - -   Right shoulder - Tender - 1 - 1 - - -   Right shoulder - Swollen - 0 - 0 - - -   Right elbow - Tender - - - - 1 - -   Right elbow - Swollen - - - - 1 1 1   Right wrist- Tender - - - - - 1 1   Right wrist- Swollen - - - - - 1 1   Right 1st MCP - Swollen - - - - - 1 -   Right 3rd MCP - Swollen - - - - 1 1 1   Right 4th MCP - Tender - - - - - 1 -   Right 4th MCP - Swollen - - - - 1 - 1   Right 2nd PIP - Tender 1 - - - - - -   Right 2nd PIP - Swollen 0 - - - - - 1   Right 3rd PIP - Tender 1 - 1 - 1 1 -   Right 3rd PIP - Swollen 0 - 0 - 1 1 1   Right 4th PIP - Tender 1 - - - - - -   Right 4th PIP - Swollen 0 - - - - - -   Right 5th PIP - Tender 1 - - - - 1 -   Right 5th PIP - Swollen 0 - - - - 1 -   Tender Joint Count (Total) 8 1 1 1 2 8 2   Swollen Joint Count (Total) 0 0 0 0 5 10 8   Physician Assessment (0-10) 2 0.5 0.5 0 3 3 3   Patient Assessment (0-10) 5 2 1 1 5.5 2.5 1.5   CDAI Total (calculated) 15 3.5 2.5 2 15.5 23.5 14.5       DATA REVIEW    Laboratory     Recent laboratory results were reviewed, summarized, and discussed with the patient. Imaging    Musculoskeletal Ultrasound    Ultrasound of the right elbow. Indication: joint pain. (12/27/17)  Using a [employer] e with 12 Mhz probe, limited views of the right elbow were reviewed. This revealed hypoechoic dopplerable collection and anechoic collection within the olecranon fossa.  The tendons were normal. Bony contours were regular without erosions seen. There were no soft tissue masses noted. Impression: right elbow synovitis with effusion    Radiographs    Chest 3/27/2018: clear lungs. There is a small calcification or bone island overlying the left costophrenic angle. Heart size is normal. There is no pulmonary edema. There is no evident pneumothorax, adenopathy or effusion. Bilateral Hand 12/27/2017: mild diffuse periarticular osteopenia. The joint space widths are normal. No erosive changes are shown. There is anatomic alignment. No pathological soft tissue calcifications or demonstrated. No localized areas of soft tissue swelling is shown. Right Elbow 12/27/2017: mild diffuse osteopenia. There is a small elbow effusion. There is mild to moderate uniform joint space narrowing which is greatest between the capitellum and radial head. No fracture is shown. No erosive changes are evident. Bilateral Foot 12/27/2017: mild diffuse periarticular osteopenia. Minimal joint space widths are demonstrated bilaterally. There are no erosive changes. No pathologic soft tissue calcifications or localized areas of soft tissue swelling are demonstrated. There is anatomic alignment. Deformity of the distal shaft of the left fifth metatarsal bone is shown suggesting remote, healed fracture. There is no evidence for acute fracture. CT Imaging    CT Chest without contrast 4/03/2018: The thyroid gland reveals no nodules. There is no axillary adenopathy. There are no enlarged mediastinal or hilar lymph nodes. There are no pleural or  ericardial effusions. No adrenal lesions. The lungs are well aerated. The central airways are patent. There are minimal linear areas of scarring at each lung base. No pneumonia. No pulmonary edema no evidence for fibrosis. Review of bone windows reveals no destructive lesions. MR Imaging    None    DXA    None    PFT    PFT 4/03/2018: FVC of 2.32 (90%), FEV1 of 1.73 (91%), FEV1/FVC of 74.8% and a DLCO of 15.11 (>80%), TLC 3.88 (96%), VC 2.28 (81%). PATHOLOGY    Right Shoulder Kenalog 40 mg IA. (04/02/20)   Kenalog 80 mg IM. (08/06/18)   Ultrasound Guided Right Elbow Kenalog 40 mg IA. (12/27/17)    ASSESSMENT AND PLAN    This is a follow-up visit for Ms. Roe. 1) Seropositive Rheumatoid Arthritis. She is maintained on methotrexate 20 mg every Monday and Xeljanz 11 mg XR with good tolerance. Her CDAI was previously 15 (previously 3.5, 2.5, 2, 15.5, 23.5, 14.5, 6) with 8 tender and 0 swollen joints, consistent with moderate disease activity. I will change her oral methotrexate to methotrexate to 25 mg SubQ and continue Violeta Muna. 2) Long Term Use of Immunosuppressants. The patient remains on immunomodulatory medications (methotrexate, Violeta Muna) and requires frequent toxicity monitoring by blood work. 3) Bilateral Knee Osteoarthritis. This was not an active issue today. 4) Intermittent Cough. This is chronic and she attributes to allergies. She was evaluated by pulmonology in the past and was told she had scar tissue in her left lung. Exam revealed bibasilar crackles. CT Chest showed bibasilar crackles without concerning findings for interstitial lung disease. PFT showed mild small airway obstruction. She was instructed to follow up with pulmonology. This is not an active issue today. 5) Vitamin D Deficiency. Her level was 21.1 (previously 32.8, 21.3). She is on ergocalciferol weekly. I will check her level. 6) Right Trochanteric Bursitis with Right Iliotibial Band Syndrome. I had referred her to physical therapy. 7) Right Shoulder Pain. This improved with an injection but she has right arm pain. I referred her to physical therapy. The patient voiced understanding of the aforementioned assessment and plan. Summary of plan was provided in the After Visit Summary patient instructions.      TODAY'S ORDERS    Orders Placed This Encounter    CBC WITH AUTOMATED DIFF    METABOLIC PANEL, COMPREHENSIVE    C REACTIVE PROTEIN, QT  SED RATE (ESR)    VITAMIN D, 25 HYDROXY    REFERRAL TO PHYSICAL THERAPY    methotrexate 25 mg/mL chemo injection    Insulin Syringe-Needle U-100 1 mL 30 gauge x 5/16 syrg     Future Appointments   Date Time Provider Yolanda Val   7/13/2020  2:00 PM Joy Vila  Amira Pascual   7/13/2020  2:30 PM LAB ONLY PAFMYESHA SAMUEL   10/7/2020  3:00 PM MD Santos Vaughn MD, [address] [place] [address]    Adult Rheumatology   Rheumatology Ultrasound Certified  [id number] [address], [address] [place] [address]   Phone [phone]  Fax [phone]

## 2020-07-07 NOTE — LETTER
7/7/20 Patient: Dalia Yeung YOB: 1960 Date of Visit: 7/7/2020 Isai Stapleton MD 
[address] [facility] [id number] VIA In Basket Dear Isai Stapleton MD, Thank you for referring Ms. Dalia Yeung to [address] [place] [address] for evaluation. My notes for this consultation are attached. If you have questions, please do not hesitate to call me. I look forward to following your patient along with you.  
 
 
Sincerely, 
 
Gibson Benitez MD

## 2020-07-14 LAB
25(OH)D3+25(OH)D2 SERPL-MCNC: 47.2 NG/ML (ref 30–100)
ALBUMIN SERPL-MCNC: 4.4 G/DL (ref 3.8–4.9)
ALBUMIN/GLOB SERPL: 2.1 {RATIO} (ref 1.2–2.2)
ALP SERPL-CCNC: 100 IU/L (ref 39–117)
ALT SERPL-CCNC: 19 IU/L (ref 0–32)
AST SERPL-CCNC: 24 IU/L (ref 0–40)
BASOPHILS # BLD AUTO: 0 X10E3/UL (ref 0–0.2)
BASOPHILS NFR BLD AUTO: 0 %
BILIRUB SERPL-MCNC: 0.3 MG/DL (ref 0–1.2)
BUN SERPL-MCNC: 22 MG/DL (ref 6–24)
BUN/CREAT SERPL: 24 (ref 9–23)
CALCIUM SERPL-MCNC: 9.7 MG/DL (ref 8.7–10.2)
CHLORIDE SERPL-SCNC: 104 MMOL/L (ref 96–106)
CO2 SERPL-SCNC: 26 MMOL/L (ref 20–29)
CREAT SERPL-MCNC: 0.9 MG/DL (ref 0.57–1)
CRP SERPL-MCNC: 8 MG/L (ref 0–10)
EOSINOPHIL # BLD AUTO: 0.1 X10E3/UL (ref 0–0.4)
EOSINOPHIL NFR BLD AUTO: 1 %
ERYTHROCYTE [DISTWIDTH] IN BLOOD BY AUTOMATED COUNT: 14.5 % (ref 11.7–15.4)
ERYTHROCYTE [SEDIMENTATION RATE] IN BLOOD BY WESTERGREN METHOD: 46 MM/HR (ref 0–40)
GLOBULIN SER CALC-MCNC: 2.1 G/DL (ref 1.5–4.5)
GLUCOSE SERPL-MCNC: 67 MG/DL (ref 65–99)
HCT VFR BLD AUTO: 34.3 % (ref 34–46.6)
HGB BLD-MCNC: 10.9 G/DL (ref 11.1–15.9)
IMM GRANULOCYTES # BLD AUTO: 0 X10E3/UL (ref 0–0.1)
IMM GRANULOCYTES NFR BLD AUTO: 0 %
LYMPHOCYTES # BLD AUTO: 1.1 X10E3/UL (ref 0.7–3.1)
LYMPHOCYTES NFR BLD AUTO: 14 %
MCH RBC QN AUTO: 29.5 PG (ref 26.6–33)
MCHC RBC AUTO-ENTMCNC: 31.8 G/DL (ref 31.5–35.7)
MCV RBC AUTO: 93 FL (ref 79–97)
MONOCYTES # BLD AUTO: 0.6 X10E3/UL (ref 0.1–0.9)
MONOCYTES NFR BLD AUTO: 8 %
NEUTROPHILS # BLD AUTO: 6.1 X10E3/UL (ref 1.4–7)
NEUTROPHILS NFR BLD AUTO: 77 %
PLATELET # BLD AUTO: 324 X10E3/UL (ref 150–450)
POTASSIUM SERPL-SCNC: 4.1 MMOL/L (ref 3.5–5.2)
PROT SERPL-MCNC: 6.5 G/DL (ref 6–8.5)
RBC # BLD AUTO: 3.69 X10E6/UL (ref 3.77–5.28)
SODIUM SERPL-SCNC: 144 MMOL/L (ref 134–144)
WBC # BLD AUTO: 8 X10E3/UL (ref 3.4–10.8)

## 2020-08-11 ENCOUNTER — OFFICE VISIT (OUTPATIENT)
Dept: FAMILY MEDICINE CLINIC | Age: 60
End: 2020-08-11

## 2020-08-11 VITALS
WEIGHT: 208.4 LBS | HEART RATE: 80 BPM | TEMPERATURE: 98.2 F | SYSTOLIC BLOOD PRESSURE: 122 MMHG | OXYGEN SATURATION: 98 % | DIASTOLIC BLOOD PRESSURE: 80 MMHG | RESPIRATION RATE: 16 BRPM | BODY MASS INDEX: 38.35 KG/M2 | HEIGHT: 62 IN

## 2020-08-11 DIAGNOSIS — R60.9 EDEMA, UNSPECIFIED TYPE: ICD-10-CM

## 2020-08-11 DIAGNOSIS — Z00.00 MEDICARE ANNUAL WELLNESS VISIT, SUBSEQUENT: Primary | ICD-10-CM

## 2020-08-11 DIAGNOSIS — Z78.9 NOT IMMUNE TO HEPATITIS B VIRUS: ICD-10-CM

## 2020-08-11 DIAGNOSIS — D64.9 ANEMIA, UNSPECIFIED TYPE: ICD-10-CM

## 2020-08-11 DIAGNOSIS — Z12.39 ENCOUNTER FOR OTHER SCREENING FOR MALIGNANT NEOPLASM OF BREAST: ICD-10-CM

## 2020-08-11 DIAGNOSIS — Z23 NEED FOR SHINGLES VACCINE: ICD-10-CM

## 2020-08-11 DIAGNOSIS — E78.2 MIXED HYPERLIPIDEMIA: ICD-10-CM

## 2020-08-11 PROCEDURE — G0439 PPPS, SUBSEQ VISIT: HCPCS | Performed by: FAMILY MEDICINE

## 2020-08-11 RX ORDER — ZOSTER VACCINE RECOMBINANT, ADJUVANTED 50 MCG/0.5
KIT INTRAMUSCULAR
Qty: 0.5 ML | Refills: 1 | Status: ON HOLD | OUTPATIENT
Start: 2020-08-11 | End: 2021-05-26

## 2020-08-11 NOTE — PROGRESS NOTES
This is the Subsequent Medicare Annual Wellness Exam, performed 12 months or more after the Initial AWV or the last Subsequent AWV    I have reviewed the patient's medical history in detail and updated the computerized patient record. Interval history:   Edema: Patient reports that a few weeks ago she saw her rheumatologist and she had some swelling in her legs. She states that it is improving. HLD:     Lab Results   Component Value Date/Time    Cholesterol, total 210 (H) 02/08/2019 08:48 AM    HDL Cholesterol 63 02/08/2019 08:48 AM    LDL, calculated 128 (H) 02/08/2019 08:48 AM    VLDL, calculated 19 02/08/2019 08:48 AM    Triglyceride 93 02/08/2019 08:48 AM   She has been working on diet and exercise      Patient reports that has recently moved and her couging and headache have resolved. History     Patient Active Problem List   Diagnosis Code    Seropositive rheumatoid arthritis of multiple sites ([facility] M05.79    Anemia D64.9    Long-term use of immunosuppressant medication Z79.899    Primary osteoarthritis of both knees M17.0    Vitamin D deficiency E55.9    Severe obesity (BMI 35.0-39. 9) E66.01    Greater trochanteric bursitis of right hip M70.61    Iliotibial band syndrome of right side M76.31     Past Medical History:   Diagnosis Date    Anemia     Reactive airway disease     Rheumatoid arthritis ([facility].) 10/24/2017      Past Surgical History:   Procedure Laterality Date    COLONOSCOPY N/A 1/15/2018    COLONOSCOPY performed by Nathalia Stevens. Blaise Loza MD at [address] [facility] OTHER SURGICAL      pins placed in left wrist      Current Outpatient Medications   Medication Sig Dispense Refill    varicella-zoster recombinant, PF, (Shingrix, PF,) 50 mcg/0.5 mL susr injection 0.5mL by IntraMUSCular route once now and then repeat in 2-6 months 0.5 mL 1    clotrimazole-betamethasone (LOTRISONE) topical cream Apply  to affected area two (2) times a day. Apply to rash of abdomen.  Do not exceed 2 weeks. 15 g 0    folic acid (FOLVITE) 1 mg tablet Take 1 Tab by mouth daily. (Patient taking differently: Take 1 mg by mouth daily. Except ) 90 Tab 0    cetirizine (ZYRTEC) 10 mg tablet Take 1 Tab by mouth daily. 90 Tab 1    tofacitinib (XELJANZ XR) 11 mg Tb24 Take 11 mg by mouth daily. 30 Tab 11    ergocalciferol (ERGOCALCIFEROL) 50,000 unit capsule Take 1 Cap by mouth every seven (7) days. Indications: Vitamin D Deficiency (High Dose Therapy) 12 Cap 4    polyethylene glycol (MIRALAX) 17 gram/dose powder Take 17 g by mouth daily as needed (constipation). Prn constipation 289 g 0    Ibuprofen-diphenhydrAMINE (ADVIL PM) 200-38 mg tab Take 200 mg by mouth as needed.  methotrexate 25 mg/mL chemo injection 1 mL by SubCUTAneous route every Monday. 12 mL 0    Insulin Syringe-Needle U-100 1 mL 30 gauge x 5/16 syrg 1 Each by Does Not Apply route Every Saturday for 12 doses. To be used for methotrexate solution 12 Syringe 3     No Known Allergies    Family History   Problem Relation Age of Onset    Thyroid Disease Mother     High Cholesterol Mother     Heart Disease Father      Social History     Tobacco Use    Smoking status: Former Smoker     Last attempt to quit:      Years since quittin.6    Smokeless tobacco: Never Used   Substance Use Topics    Alcohol use: No       Depression Risk Factor Screening:     3 most recent PHQ Screens 2020   Little interest or pleasure in doing things Not at all   Feeling down, depressed, irritable, or hopeless Not at all   Total Score PHQ 2 0       Alcohol Risk Factor Screening:   Do you average 1 drink per night or more than 7 drinks a week:  No    On any one occasion in the past three months have you have had more than 3 drinks containing alcohol:  No      Functional Ability and Level of Safety:   Hearing: Hearing is good. Activities of Daily Living:   The home contains: good lighting  Patient does total self care     Ambulation: with no difficulty     Fall Risk:  Fall Risk Assessment, last 12 mths 10/24/2017   Able to walk? Yes   Fall in past 12 months? No     Abuse Screen:  Patient is not abused       Cognitive Screening   Has your family/caregiver stated any concerns about your memory: no     Cognitive Screening: normal    Patient Care Team   Patient Care Team:  Mehnaz Pagan MD as PCP - General (Family Medicine)  Mehnaz Pagan MD as PCP - [facility] Empaneled Provider  Danika Casey MD as Physician (Rheumatology)  Rosa Malik MD as Physician (Gastroenterology)  Louis Andrade MD as Physician (Rheumatology)    Vital Signs and Physical Exam     Visit Vitals  /80 (BP 1 Location: Left arm, BP Patient Position: Sitting)   Pulse 80   Temp 98.2 °F (36.8 °C) (Oral)   Resp 16   Ht 5' 2\" (1.575 m)   Wt 208 lb 6.4 oz (94.5 kg)   SpO2 98%   BMI 38.12 kg/m²     Physical Exam  General appearance: NAD, conversant   Eyes: anicteric sclerae, moist conjunctivae; no lid-lag; PERRLA  HENT: Atraumatic; oropharynx clear with moist mucous membranes and no mucosal ulcerations; normal hard and soft palate  Neck: Trachea midline; FROM, supple, no thyromegaly or lymphadenopathy  Lungs: CTA, with normal respiratory effort and no intercostal retractions  CV: normal rate, reglar rhythm, no JVD, no MRGs   Abdomen: Soft, non-tender; no masses or HSM  Extremities: 1+ LE edema  Skin: Normal temperature, turgor and texture; no rash, ulcers or subcutaneous nodules  Neuro: CN 2-12 grossly intact. DTRs 2+ and symmetrical throughout. Stable gait. Assessment/Plan   Education and counseling provided:  Are appropriate based on today's review and evaluation      ICD-10-CM ICD-9-CM    1. Medicare annual wellness visit, subsequent  Z00.00 V70.0    2. Edema, unspecified type  R60.9 782.3 NT-PRO BNP   3. Encounter for other screening for malignant neoplasm of breast  Z12.39 V76.19 BALBIR MAMMO BI SCREENING INCL CAD   4.  Not immune to hepatitis B virus  Z78.9 V49.89 5. Mixed hyperlipidemia  E78.2 272.2 LIPID PANEL   6. Anemia, unspecified type  D64.9 285.9 CBC WITH AUTOMATED DIFF      IRON PROFILE   7. Need for shingles vaccine  Z23 V04.89 varicella-zoster recombinant, PF, (Shingrix, PF,) 50 mcg/0.5 mL susr injection     · Medicare Wellness Exam: Reviewed and addressed patient's medical history and concerns as discussed in note. Reviewed recommended screenings and immunizations. Discussed recommendations for diet, exercise, and lifestyle. · Edema: Patient with report of edema that is now improving. 1+ edema on exam today; will check labs  · Not immune to hepatitis B: Reviewed recent labs with patient showing Hep B surface AB as inconsistent with immunity. Discussed recommendation for hepatitis B booster with pharmacy. Patient expresses understanding. · HLD: Chronic, unclear control. Will check lipids  · Anemia: Chronic, unclear control.  Will check CBC and iron profile    Health Maintenance Due   Topic Date Due    DTaP/Tdap/Td series (1 - Tdap) 09/03/1981    Shingrix Vaccine Age 50> (1 of 2) 09/03/2010    Breast Cancer Screen Mammogram  12/12/2019

## 2020-08-11 NOTE — PATIENT INSTRUCTIONS
Hepatitis B Vaccine: What You Need to Know Why get vaccinated? Hepatitis B vaccine can prevent hepatitis B. Hepatitis B is a liver disease that can cause mild illness lasting a few weeks, or it can lead to a serious, lifelong illness. · Acute hepatitis B infection is a short-term illness that can lead to fever, fatigue, loss of appetite, nausea, vomiting, jaundice (yellow skin or eyes, dark urine, ankush-colored bowel movements), and pain in the muscles, joints, and stomach. · Chronic hepatitis B infection is a long-term illness that occurs when the hepatitis B virus remains in a person's body. Most people who go on to develop chronic hepatitis B do not have symptoms, but it is still very serious and can lead to liver damage (cirrhosis), liver cancer, and death. Chronically-infected people can spread hepatitis B virus to others, even if they do not feel or look sick themselves. Hepatitis B is spread when blood, semen, or other body fluid infected with the hepatitis B virus enters the body of a person who is not infected. People can become infected through: · Birth (if a mother has hepatitis B, her baby can become infected) · Sharing items such as razors or toothbrushes with an infected person · Contact with the blood or open sores of an infected person · Sex with an infected partner · Sharing needles, syringes, or other drug-injection equipment · Exposure to blood from needlesticks or other sharp instruments Most people who are vaccinated with hepatitis B vaccine are immune for life. Hepatitis B vaccine Hepatitis B vaccine is usually given as 2, 3, or 4 shots. Infants should get their first dose of hepatitis B vaccine at birth and will usually complete the series at 10months of age (sometimes it will take longer than 6 months to complete the series). Children and adolescents younger than 23years of age who have not yet gotten the vaccine should also be vaccinated. Hepatitis B vaccine is also recommended for certain unvaccinated adults: 
· People whose sex partners have hepatitis B 
· Sexually active persons who are not in a long-term monogamous relationship · Persons seeking evaluation or treatment for a sexually transmitted disease · Men who have sexual contact with other men · People who share needles, syringes, or other drug-injection equipment · People who have household contact with someone infected with the hepatitis B virus · Health care and public safety workers at risk for exposure to blood or body fluids · Residents and staff of facilities for developmentally disabled persons · Persons in correctional facilities · Victims of sexual assault or abuse · Travelers to regions with increased rates of hepatitis B 
· People with chronic liver disease, kidney disease, HIV infection, infection with hepatitis C, or diabetes · Anyone who wants to be protected from hepatitis B Hepatitis B vaccine may be given at the same time as other vaccines. Talk with your health care provider Tell your vaccine provider if the person getting the vaccine: 
· Has had an allergic reaction after a previous dose of hepatitis B vaccine, or has any severe, life-threatening allergies. In some cases, your health care provider may decide to postpone hepatitis B vaccination to a future visit. People with minor illnesses, such as a cold, may be vaccinated. People who are moderately or severely ill should usually wait until they recover before getting hepatitis B vaccine. Your health care provider can give you more information. Risks of a vaccine reaction · Soreness where the shot is given or fever can happen after hepatitis B vaccine. People sometimes faint after medical procedures, including vaccination. Tell your provider if you feel dizzy or have vision changes or ringing in the ears.  
As with any medicine, there is a very remote chance of a vaccine causing a severe allergic reaction, other serious injury, or death. What if there is a serious problem? An allergic reaction could occur after the vaccinated person leaves the clinic. If you see signs of a severe allergic reaction (hives, swelling of the face and throat, difficulty breathing, a fast heartbeat, dizziness, or weakness), call 9-1-1 and get the person to the nearest hospital. 
For other signs that concern you, call your health care provider. Adverse reactions should be reported to the Vaccine Adverse Event Reporting System (VAERS). Your health care provider will usually file this report, or you can do it yourself. Visit the VAERS website at www.vaers. [facility].gov or call 9-438.945.5562. VAERS is only for reporting reactions, and VAERS staff do not give medical advice. The National Vaccine Injury Compensation Program 
The National Vaccine Injury Compensation Program (VICP) is a federal program that was created to compensate people who may have been injured by certain vaccines. Visit the VICP website at www.hrsa.gov/vaccinecompensation or call 5-275.537.4033 to learn about the program and about filing a claim. There is a time limit to file a claim for compensation. How can I learn more? · Ask your healthcare provider. · Call your local or state health department. · Contact the Centers for Disease Control and Prevention (CDC): 
? Call 7-385.731.3561 (1-800-CDC-INFO) or 
? Visit CDC's website at www.cdc.gov/vaccines. Vaccine Information Statement (Interim) Hepatitis B Vaccine 8/15/2019 
42 RITA Jarquin [id number] [place]. [place] Department of Health and [employer] Centers for Disease Control and Prevention Many Vaccine Information Statements are available in Syriac and other languages. See www.immunize.org/vis. Hojas de información sobre vacunas están disponibles en español y en otros idiomas. Visite www.immunize.org/vis.  
Care instructions adapted under license by [employer] (which disclaims liability or warranty for this information). If you have questions about a medical condition or this instruction, always ask your healthcare professional. [address] any warranty or liability for your use of this information. Hepatitis B: Care Instructions Your Care Instructions Hepatitis B is a disease caused by a virus that infects the liver. It spreads through infected blood, semen, and other body fluids during sexual contact. It also can spread when people share needles to inject drugs or share things that may have blood on them. These include razors and toothbrushes. Needles used for tattoos, body piercing, or acupuncture can spread the disease if they are not cleaned the right way. After you get the virus, it may be 1 to 6 months before you see symptoms. You may never notice them. You can give the disease to other people before and after you have symptoms. Hepatitis B can make you tired. It can cause a fever, nausea, vomiting, light-colored stools, and dark urine. Your skin or eyes may look yellow. This is called jaundice. Most people get better in several weeks, but it can take several months. For some people the virus stays in their bodies. If the virus stays in your body for a long time, it can cause serious liver disease. After you have had the virus and feel better, you will not get it again. Follow-up care is a key part of your treatment and safety. Be sure to make and go to all appointments, and call your doctor if you are having problems. It's also a good idea to know your test results and keep a list of the medicines you take. How can you care for yourself at home? · Reduce your activity to match your energy level. · Avoid alcohol for as long as your doctor tells you to. This may be months. Alcohol can make liver problems worse. · Make sure your doctor knows all the medicines you take.  Some medicines, such as acetaminophen (Tylenol), can make liver problems worse. Do not take any new medicines unless your doctor says it is okay. · Be safe with medicines. If your doctor prescribes antiviral medicine, take it exactly as prescribed. Call your doctor if you think you are having a problem with your medicine. · If you have nausea or vomiting, try to eat smaller meals and eat more often. · Drink plenty of fluids, enough so that your urine is light yellow or clear like water. If you have kidney, heart, or liver disease and have to limit fluids, talk with your doctor before you increase the amount of fluids you drink. · If you have itchy skin, keep cool, stay out of the sun, and wear cotton clothing. Talk to your doctor about using over-the-counter medicines, such as diphenhydramine (Benadryl) or loratadine (Claritin), to control the itching. Read and follow the instructions on the label. To prevent spreading hepatitis B 
· Tell the people you live with or have sex with about your illness as soon as possible. The CDC recommends that people in close contact with an infected person get the hepatitis B vaccine. · Do not donate blood or blood products, organs, semen, or eggs (ova). · Stop all sexual activity or use latex condoms. Do this until your doctor tells you that you can no longer give hepatitis B to others. Avoid anal contact with a sex partner while you are infected. · Do not share personal items that may have your blood on them. These include razors, toothbrushes, towels, and nail files. · Use lotions or ointments to prevent chapped or broken skin. These skin problems can expose others to your blood. · Tell your doctor, dentist, and anyone else who may come in contact with your blood about your illness. · If you are pregnant, tell the doctor who will deliver your baby about your illness. Be sure your baby gets medicine to prevent infection. This should start right after birth. · If you get blood on your clothing or other fabrics, clean them well. · Be sure to carefully get rid of sanitary napkins and tampons or other disposable items that have your blood on them. Place them in sealed plastic bags before you throw them away. · Use a solution of 1 part bleach to 10 parts water to clean surfaces that have your blood or any other body fluid (such as semen or menstrual blood) on them. These surfaces include toilet seats, countertops, and floors. · If you have long-term hepatitis B, always use latex condoms during any sexual activity. You can infect others with the virus even if you do not have symptoms. When should you call for help? [id number] anytime you think you may need emergency care. For example, call if: 
· You passed out (lost consciousness). Call your doctor now or seek immediate medical care if: 
· You have new or worse belly pain. · You have a new or higher fever. · You are dizzy or lightheaded, or you feel like you may faint. · You have symptoms of dehydration, such as: 
? Dry eyes and a dry mouth. ? Passing only a little urine. ? Feeling thirstier than normal. 
· You cannot keep down medicine or fluids. · You have new or more blood in stools. · You have new or worse vomiting or diarrhea. Watch closely for changes in your health, and be sure to contact your doctor if: 
· You do not get better as expected. Where can you learn more? Go to http://saw-fifi.info/ Enter [phone] in the search box to learn more about \"Hepatitis B: Care Instructions. \" Current as of: February 11, 2020               Content Version: 12.5 © 1094-8057 [employer]. Care instructions adapted under license by [employer] (which disclaims liability or warranty for this information).  If you have questions about a medical condition or this instruction, always ask your healthcare professional. [employer] disclaims any warranty or liability for your use of this information. Medicare Wellness Visit, Female The best way to live healthy is to have a lifestyle where you eat a well-balanced diet, exercise regularly, limit alcohol use, and quit all forms of tobacco/nicotine, if applicable. Regular preventive services are another way to keep healthy. Preventive services (vaccines, screening tests, monitoring & exams) can help personalize your care plan, which helps you manage your own care. Screening tests can find health problems at the earliest stages, when they are easiest to treat. [facility] follows the current, evidence-based guidelines published by the [facility] States Garett Leos ([facility]) when recommending preventive services for our patients. Because we follow these guidelines, sometimes recommendations change over time as research supports it. (For example, mammograms used to be recommended annually. Even though Medicare will still pay for an annual mammogram, the newer guidelines recommend a mammogram every two years for women of average risk). Of course, you and your doctor may decide to screen more often for some diseases, based on your risk and your co-morbidities (chronic disease you are already diagnosed with). Preventive services for you include: - Medicare offers their members a free annual wellness visit, which is time for you and your primary care provider to discuss and plan for your preventive service needs. Take advantage of this benefit every year! 
-All adults over the age of 72 should receive the recommended pneumonia vaccines. Current USPSTF guidelines recommend a series of two vaccines for the best pneumonia protection.  
-All adults should have a flu vaccine yearly and a tetanus vaccine every 10 years.  
-All adults age 48 and older should receive the shingles vaccines (series of two vaccines). -All adults age 38-68 who are overweight should have a diabetes screening test once every three years.  
-All adults born between [birth year] and 1965 should be screened once for Hepatitis C. 
-Other screening tests and preventive services for persons with diabetes include: an eye exam to screen for diabetic retinopathy, a kidney function test, a foot exam, and stricter control over your cholesterol.  
-Cardiovascular screening for adults with routine risk involves an electrocardiogram (ECG) at intervals determined by your doctor.  
-Colorectal cancer screenings should be done for adults age 54-65 with no increased risk factors for colorectal cancer. There are a number of acceptable methods of screening for this type of cancer. Each test has its own benefits and drawbacks. Discuss with your doctor what is most appropriate for you during your annual wellness visit. The different tests include: colonoscopy (considered the best screening method), a fecal occult blood test, a fecal DNA test, and sigmoidoscopy. 
 
-A bone mass density test is recommended when a woman turns 65 to screen for osteoporosis. This test is only recommended one time, as a screening. Some providers will use this same test as a disease monitoring tool if you already have osteoporosis. -Breast cancer screenings are recommended every other year for women of normal risk, age 54-69. 
-Cervical cancer screenings for women over age 72 are only recommended with certain risk factors. Here is a list of your current Health Maintenance items (your personalized list of preventive services) with a due date: 
Health Maintenance Due Topic Date Due  
 DTaP/Tdap/Td  (1 - Tdap) 09/03/1981  Shingles Vaccine (1 of 2) 09/03/2010  Mammogram  12/12/2019 [address] [place] [address] Annual Well Visit  02/09/2020

## 2020-08-11 NOTE — PROGRESS NOTES
Chief Complaint   Patient presents with   Steve Annual Wellness Visit     1. Have you been to the ER, urgent care clinic since your last visit? Hospitalized since your last visit? No    2. Have you seen or consulted any other health care providers outside of the [address] [place] [address] since your last visit? Include any pap smears or colon screening.  No

## 2020-08-20 ENCOUNTER — APPOINTMENT (OUTPATIENT)
Dept: FAMILY MEDICINE CLINIC | Age: 60
End: 2020-08-20

## 2020-08-21 LAB
BASOPHILS # BLD AUTO: 0.1 X10E3/UL (ref 0–0.2)
BASOPHILS NFR BLD AUTO: 1 %
CHOLEST SERPL-MCNC: 223 MG/DL (ref 100–199)
EOSINOPHIL # BLD AUTO: 0.1 X10E3/UL (ref 0–0.4)
EOSINOPHIL NFR BLD AUTO: 1 %
ERYTHROCYTE [DISTWIDTH] IN BLOOD BY AUTOMATED COUNT: 13.9 % (ref 11.7–15.4)
HCT VFR BLD AUTO: 36.5 % (ref 34–46.6)
HDLC SERPL-MCNC: 65 MG/DL
HGB BLD-MCNC: 11.7 G/DL (ref 11.1–15.9)
IMM GRANULOCYTES # BLD AUTO: 0 X10E3/UL (ref 0–0.1)
IMM GRANULOCYTES NFR BLD AUTO: 0 %
INTERPRETATION, 910389: NORMAL
IRON SATN MFR SERPL: 15 % (ref 15–55)
IRON SERPL-MCNC: 47 UG/DL (ref 27–159)
LDLC SERPL CALC-MCNC: 141 MG/DL (ref 0–99)
LYMPHOCYTES # BLD AUTO: 1.1 X10E3/UL (ref 0.7–3.1)
LYMPHOCYTES NFR BLD AUTO: 13 %
MCH RBC QN AUTO: 29.4 PG (ref 26.6–33)
MCHC RBC AUTO-ENTMCNC: 32.1 G/DL (ref 31.5–35.7)
MCV RBC AUTO: 92 FL (ref 79–97)
MONOCYTES # BLD AUTO: 0.7 X10E3/UL (ref 0.1–0.9)
MONOCYTES NFR BLD AUTO: 8 %
NEUTROPHILS # BLD AUTO: 6.5 X10E3/UL (ref 1.4–7)
NEUTROPHILS NFR BLD AUTO: 77 %
NT-PROBNP SERPL-MCNC: 148 PG/ML (ref 0–287)
PLATELET # BLD AUTO: 293 X10E3/UL (ref 150–450)
RBC # BLD AUTO: 3.98 X10E6/UL (ref 3.77–5.28)
TIBC SERPL-MCNC: 316 UG/DL (ref 250–450)
TRIGL SERPL-MCNC: 83 MG/DL (ref 0–149)
UIBC SERPL-MCNC: 269 UG/DL (ref 131–425)
VLDLC SERPL CALC-MCNC: 17 MG/DL (ref 5–40)
WBC # BLD AUTO: 8.5 X10E3/UL (ref 3.4–10.8)

## 2020-09-09 DIAGNOSIS — Z91.09 ENVIRONMENTAL ALLERGIES: ICD-10-CM

## 2020-09-09 NOTE — TELEPHONE ENCOUNTER
Last Visit: 8/11/20  MD Antony Villegas, labs 8/20/20  Next Appointment: Not scheduled  Previous Refill Encounter(s): 4/14/20  90 + 1    Requested Prescriptions     Pending Prescriptions Disp Refills    cetirizine (ZYRTEC) 10 mg tablet 90 Tab 3     Sig: Take 1 Tab by mouth daily.

## 2020-09-10 RX ORDER — CETIRIZINE HCL 10 MG
10 TABLET ORAL DAILY
Qty: 90 TAB | Refills: 3 | Status: SHIPPED | OUTPATIENT
Start: 2020-09-10 | End: 2020-10-08 | Stop reason: SDUPTHER

## 2020-10-07 ENCOUNTER — OFFICE VISIT (OUTPATIENT)
Dept: RHEUMATOLOGY | Age: 60
End: 2020-10-07

## 2020-10-07 VITALS
SYSTOLIC BLOOD PRESSURE: 142 MMHG | HEART RATE: 72 BPM | RESPIRATION RATE: 18 BRPM | TEMPERATURE: 98.2 F | WEIGHT: 202 LBS | BODY MASS INDEX: 36.95 KG/M2 | DIASTOLIC BLOOD PRESSURE: 88 MMHG

## 2020-10-07 DIAGNOSIS — Z79.60 LONG-TERM USE OF IMMUNOSUPPRESSANT MEDICATION: ICD-10-CM

## 2020-10-07 DIAGNOSIS — M05.79 SEROPOSITIVE RHEUMATOID ARTHRITIS OF MULTIPLE SITES (HCC): Primary | ICD-10-CM

## 2020-10-07 DIAGNOSIS — G89.29 CHRONIC RIGHT SHOULDER PAIN: ICD-10-CM

## 2020-10-07 DIAGNOSIS — M25.511 CHRONIC RIGHT SHOULDER PAIN: ICD-10-CM

## 2020-10-07 DIAGNOSIS — E55.9 VITAMIN D DEFICIENCY: ICD-10-CM

## 2020-10-07 PROCEDURE — 3017F COLORECTAL CA SCREEN DOC REV: CPT | Performed by: INTERNAL MEDICINE

## 2020-10-07 PROCEDURE — G8417 CALC BMI ABV UP PARAM F/U: HCPCS | Performed by: INTERNAL MEDICINE

## 2020-10-07 PROCEDURE — G8427 DOCREV CUR MEDS BY ELIG CLIN: HCPCS | Performed by: INTERNAL MEDICINE

## 2020-10-07 PROCEDURE — 99215 OFFICE O/P EST HI 40 MIN: CPT | Performed by: INTERNAL MEDICINE

## 2020-10-07 PROCEDURE — G8510 SCR DEP NEG, NO PLAN REQD: HCPCS | Performed by: INTERNAL MEDICINE

## 2020-10-07 RX ORDER — FOLIC ACID 1 MG/1
1 TABLET ORAL DAILY
Qty: 90 TAB | Refills: 5 | Status: SHIPPED | OUTPATIENT
Start: 2020-10-07 | End: 2021-02-13 | Stop reason: SDUPTHER

## 2020-10-07 NOTE — LETTER
10/7/20 Patient: Tara Daly YOB: 1960 Date of Visit: 10/7/2020 Krys Barros MD 
[address] [facility] [address] [id number] VIA In Basket Dear Krys Barros MD, Thank you for referring Ms. Tara Daly to [address] [place] [address] for evaluation. My notes for this consultation are attached. If you have questions, please do not hesitate to call me. I look forward to following your patient along with you.  
 
 
Sincerely, 
 
Cornelia Chicas MD

## 2020-10-07 NOTE — PROGRESS NOTES
REASON FOR VISIT    This is a follow-up visit for Ms. Roe for    ICD-10-CM   1. Seropositive rheumatoid arthritis of multiple sites ([facility] M05.79     Inflammatory arthritis phenotype includes:  Anti-CCP positive: yes (>250)  Rheumatoid factor positive: yes (51.6)  Erosive disease: no  Extra-articular manifestations include: bibasilar scarring    Immunosuppression Screening (1/07/2020):  Quantiferon TB: indeterminate  PPD:  Negative (1/10/2018)  Hepatitis B: negative  Hepatitis C: negative    Therapy History includes:  Current DMARD therapy includes: methotrexate 25 mg SubQ every Monday (9/28/2020 to present), Shraddha Israel 11 mg XR (3/27/2018 to 12/2019)  Prior DMARD therapy includes: methotrexate 20 mg every Monday (3/2015 to 9/28/2020), Enbrel (6 weeks)  The following DMARDs have been ineffective: none  The following DMARDs were stopped because of side effects: Enbrel (injection site reaction, increased cough, other unknown)    Immunizations:   Immunization History   Administered Date(s) Administered    TB Skin Test (PPD) Intradermal 01/08/2018, 01/08/2018     Active problems include:    Patient Active Problem List   Diagnosis Code    Seropositive rheumatoid arthritis of multiple sites ([facility] M05.79    Anemia D64.9    Long-term use of immunosuppressant medication Z79.899    Primary osteoarthritis of both knees M17.0    Vitamin D deficiency E55.9    Severe obesity (BMI 35.0-39. 9) E66.01    Greater trochanteric bursitis of right hip M70.61    Iliotibial band syndrome of right side M76.31     HISTORY OF PRESENT ILLNESS    Ms. Joe Resendiz returns for a follow-up. On her last visit, I changed methotrexate 20 mg every Monday to 25 mg SubQ and Xeljanz 11 mg XR daily, which she has taken with good tolerance. She started it on 9/28/2020 without issues. Today, she feels ok. She denies pain, swelling, or stiffness in her hands or wrists. She has sore discomfort around her right trapezius.  She has not gone to physical therapy    She denies fever, weight loss, blurred vision, vision loss, oral ulcers, ankle swelling, dry cough, dyspnea, nausea, vomiting, dysphagia, abdominal pain, black or bloody stool, fall since last visit, rash, easy bruising and increased thirst.    Last toxicity monitoring by blood work was done on 7/13/2020 and did not reveal any significant adverse effects, except Hct 34.3%,     Most recent inflammatory markers from 7/13/2020 revealed a ESR 46 mm/hr and CRP 8 mg/L. The patient has not had any interval hospital admissions, infections, or surgeries. REVIEW OF SYSTEMS    A comprehensive review of systems was performed and pertinent results are documented in the HPI, review of systems is otherwise non-contributory. PAST MEDICAL HISTORY    She has a past medical history of Anemia, Reactive airway disease, and Rheumatoid arthritis ([facility] (10/24/2017). FAMILY HISTORY    Her family history includes Heart Disease in her father; High Cholesterol in her mother; Thyroid Disease in her mother. SOCIAL HISTORY    She reports that she quit smoking about 8 years ago. She has never used smokeless tobacco. She reports that she does not drink alcohol or use drugs. MEDICATIONS    Current Outpatient Medications   Medication Sig Dispense Refill    folic acid (FOLVITE) 1 mg tablet Take 1 Tab by mouth daily. 90 Tab 5    cetirizine (ZYRTEC) 10 mg tablet Take 1 Tab by mouth daily. 90 Tab 3    methotrexate 25 mg/mL chemo injection 1 mL by SubCUTAneous route every Monday. 12 mL 0    tofacitinib (XELJANZ XR) 11 mg Tb24 Take 11 mg by mouth daily. 30 Tab 11    ergocalciferol (ERGOCALCIFEROL) 50,000 unit capsule Take 1 Cap by mouth every seven (7) days. Indications: Vitamin D Deficiency (High Dose Therapy) 12 Cap 4    polyethylene glycol (MIRALAX) 17 gram/dose powder Take 17 g by mouth daily as needed (constipation).  Prn constipation 289 g 0    Ibuprofen-diphenhydrAMINE (ADVIL PM) 200-38 mg tab Take 200 mg by mouth as needed.  varicella-zoster recombinant, PF, (Shingrix, PF,) 50 mcg/0.5 mL susr injection 0.5mL by IntraMUSCular route once now and then repeat in 2-6 months 0.5 mL 1        ALLERGIES    No Known Allergies    PHYSICAL EXAMINATION    Visit Vitals  BP (!) 142/88   Pulse 72   Temp 98.2 °F (36.8 °C)   Resp 18   Wt 202 lb (91.6 kg)   BMI 36.95 kg/m²     Body mass index is 36.95 kg/m². General: Patient is alert, oriented x 3, not in acute distress    HEENT:   Sclerae are not injected and appear moist.  There is no alopecia. Neck is supple     Chest:  Breathing comfortably comfortably at room air    Neurological exam:  Muscle strength is full in upper and lower extremities     Extremities:  Free of clubbing, cyanosis    Skin exam:    There are no rashes, no alopecia, no discoid lesions, no active Raynaud's, no livedo reticularis, no periungual erythema. Musculoskeletal exam:  A comprehensive musculoskeletal exam was performed for all joints of each upper and lower extremity and assessed for swelling, tenderness and range of motion.  Positive results are documented as below:    Right trochanteric tenderness  Right iliotibial band tenderness  Bilateral knee crepitus without effusion with hyperextension    Z-Deformities:   no  [place] Neck Deformities:  no  Boutonierre's Deformities:  no  Ulnar Deviation:   no  MCP Subluxation:  no     Joint Count 10/7/2020 7/7/2020 1/7/2020 5/24/2019 11/6/2018 8/6/2018 5/1/2018   Patient pain (0-100) 20 40 40 60 10 85 10   MHAQ 0 1 0 0.875 0 0.25 0   Left wrist- Tender 0 - - - - - -   Left wrist- Swollen 0 - - - - - 1   Left 1st MCP - Swollen - - - - - - 1   Left 2nd PIP - Tender - 1 - - - - 1   Left 2nd PIP - Swollen - 0 - - - - 1   Left 3rd PIP - Tender - 1 - - - 0 1   Left 3rd PIP - Swollen - 0 - - - 1 1   Left 4th PIP - Tender - 1 - - - - 1   Left 4th PIP - Swollen - 0 - - - - -   Left 5th PIP - Tender - 1 - - - - 1   Left 5th PIP - Swollen - 0 - - - - - Right shoulder - Tender - - 1 - 1 - -   Right shoulder - Swollen - - 0 - 0 - -   Right elbow - Tender - - - - - 1 -   Right elbow - Swollen - - - - - 1 1   Right wrist- Tender - - - - - - 1   Right wrist- Swollen - - - - - - 1   Right 1st MCP - Swollen - - - - - - 1   Right 3rd MCP - Swollen - - - - - 1 1   Right 4th MCP - Tender - - - - - - 1   Right 4th MCP - Swollen - - - - - 1 -   Right 2nd PIP - Tender - 1 - - - - -   Right 2nd PIP - Swollen - 0 - - - - -   Right 3rd PIP - Tender - 1 - 1 - 1 1   Right 3rd PIP - Swollen - 0 - 0 - 1 1   Right 4th PIP - Tender - 1 - - - - -   Right 4th PIP - Swollen - 0 - - - - -   Right 5th PIP - Tender - 1 - - - - 1   Right 5th PIP - Swollen - 0 - - - - 1   Tender Joint Count (Total) 0 8 1 1 1 2 8   Swollen Joint Count (Total) 0 0 0 0 0 5 10   Physician Assessment (0-10) 0 2 0.5 0.5 0 3 3   Patient Assessment (0-10) 5 5 2 1 1 5.5 2.5   CDAI Total (calculated) 5 15 3.5 2.5 2 15.5 23.5     DATA REVIEW    Laboratory     Recent laboratory results were reviewed, summarized, and discussed with the patient. Imaging    Musculoskeletal Ultrasound    Ultrasound of the right elbow. Indication: joint pain. (12/27/17)  Using a HuTerra e with 12 Mhz probe, limited views of the right elbow were reviewed. This revealed hypoechoic dopplerable collection and anechoic collection within the olecranon fossa. The tendons were normal. Bony contours were regular without erosions seen. There were no soft tissue masses noted. Impression: right elbow synovitis with effusion    Radiographs    Chest 3/27/2018: clear lungs. There is a small calcification or bone island overlying the left costophrenic angle. Heart size is normal. There is no pulmonary edema. There is no evident pneumothorax, adenopathy or effusion. Bilateral Hand 12/27/2017: mild diffuse periarticular osteopenia. The joint space widths are normal. No erosive changes are shown. There is anatomic alignment.  No pathological soft tissue calcifications or demonstrated. No localized areas of soft tissue swelling is shown. Right Elbow 12/27/2017: mild diffuse osteopenia. There is a small elbow effusion. There is mild to moderate uniform joint space narrowing which is greatest between the capitellum and radial head. No fracture is shown. No erosive changes are evident. Bilateral Foot 12/27/2017: mild diffuse periarticular osteopenia. Minimal joint space widths are demonstrated bilaterally. There are no erosive changes. No pathologic soft tissue calcifications or localized areas of soft tissue swelling are demonstrated. There is anatomic alignment. Deformity of the distal shaft of the left fifth metatarsal bone is shown suggesting remote, healed fracture. There is no evidence for acute fracture. CT Imaging    CT Chest without contrast 4/03/2018: The thyroid gland reveals no nodules. There is no axillary adenopathy. There are no enlarged mediastinal or hilar lymph nodes. There are no pleural or  ericardial effusions. No adrenal lesions. The lungs are well aerated. The central airways are patent. There are minimal linear areas of scarring at each lung base. No pneumonia. No pulmonary edema no evidence for fibrosis. Review of bone windows reveals no destructive lesions. MR Imaging    None    DXA    None    PFT    PFT 4/03/2018: FVC of 2.32 (90%), FEV1 of 1.73 (91%), FEV1/FVC of 74.8% and a DLCO of 15.11 (>80%), TLC 3.88 (96%), VC 2.28 (81%). PATHOLOGY    Right Shoulder Kenalog 40 mg IA. (04/02/20)   Kenalog 80 mg IM. (08/06/18)   Ultrasound Guided Right Elbow Kenalog 40 mg IA. (12/27/17)    ASSESSMENT AND PLAN    This is a follow-up visit for Ms. Roe. 1) Seropositive Rheumatoid Arthritis. She is maintained on methotrexate 25 mg SubQ every Monday and Xeljanz 11 mg XR with good tolerance. She feels well.     Her CDAI was previously 5 (previously 15, 3.5, 2.5, 2, 15.5, 23.5, 14.5, 6) with 8 tender and 0 swollen joints, consistent with low disease activity. I will continue treatment. 2) Long Term Use of Immunosuppressants. The patient remains on immunomodulatory medications (methotrexate, Dorla Riis) and requires frequent toxicity monitoring by blood work. 3) Bilateral Knee Osteoarthritis. This was not an active issue today. 4) Intermittent Cough. This is chronic and she attributes to allergies. She was evaluated by pulmonology in the past and was told she had scar tissue in her left lung. Exam revealed bibasilar crackles. CT Chest showed bibasilar crackles without concerning findings for interstitial lung disease. PFT showed mild small airway obstruction. She was instructed to follow up with pulmonology. This is not an active issue today. 5) Vitamin D Deficiency. Her level was 47.2 (previously 21.2, 32.8, 21.3). She is on ergocalciferol weekly. 6) Right Trochanteric Bursitis with Right Iliotibial Band Syndrome. I had referred her to physical therapy. 7) Right Shoulder Pain. This improved with an injection but she has right arm pain. I had referred her to physical therapy. The patient voiced understanding of the aforementioned assessment and plan. Summary of plan was provided in the After Visit Summary patient instructions.      TODAY'S ORDERS    Orders Placed This Encounter    folic acid (FOLVITE) 1 mg tablet     Future Appointments   Date Time Provider Yolanda Neal   4/7/2021  1:40 PM Peter Fine MD AOCR BS AMB     Zohreh Arreguin MD, [address] [facility]    Adult Rheumatology   Rheumatology Ultrasound Certified  [facility], [address] [facility]   Phone [phone]  Fax [phone]

## 2020-10-08 DIAGNOSIS — Z91.09 ENVIRONMENTAL ALLERGIES: ICD-10-CM

## 2020-10-08 RX ORDER — CETIRIZINE HCL 10 MG
10 TABLET ORAL DAILY
Qty: 90 TAB | Refills: 3 | Status: SHIPPED | OUTPATIENT
Start: 2020-10-08 | End: 2021-10-08

## 2020-10-15 ENCOUNTER — HOSPITAL ENCOUNTER (OUTPATIENT)
Dept: PHYSICAL THERAPY | Age: 60
Discharge: HOME | End: 2020-10-15
Payer: MEDICARE

## 2020-10-15 PROCEDURE — 97161 PT EVAL LOW COMPLEX 20 MIN: CPT | Performed by: PHYSICAL THERAPIST

## 2020-10-15 PROCEDURE — 97140 MANUAL THERAPY 1/> REGIONS: CPT | Performed by: PHYSICAL THERAPIST

## 2020-10-15 NOTE — PROGRESS NOTES
[facility] Physical Therapy  [address]  [place], [address] [facility]  Phone: [phone]  Fax: [phone]    Plan of Care/Statement of Necessity for Physical Therapy Services  -15    Patient name: Rosalind Evangelista  : 1960  Provider#: [id number]  Referral source: Vinicio Pina MD      Medical/Treatment Diagnosis: Pain in right shoulder [M25.511]     Prior Hospitalization: see medical history     Comorbidities: see evaluation  Prior Level of Function:see evaluation  Medications: Verified on Patient Summary List  Start of Care: 10/15/2020     Onset Date:see evaluation   The Plan of Care and following information is based on the information from the initial evaluation.     Assessment/ key information: Patient presents with signs and symptoms that suggest (R) shoulder subluxation with subsequent relocation and will benefit from physical therapy to address deficits noted below in problem list.   Evaluation Complexity History LOW Complexity : Zero comorbidities / personal factors that will impact the outcome / POC; Examination LOW Complexity : 1-2 Standardized tests and measures addressing body structure, function, activity limitation and / or participation in recreation  ;Presentation LOW Complexity : Stable, uncomplicated  ;Clinical Decision Making Other outcome measures clinical judgment  LOW   Overall Complexity Rating: LOW   Problem List: pain affecting function, decrease ROM, decrease activity tolerance and decrease flexibility/ joint mobility   Treatment Plan may include any combination of the following: Therapeutic exercise, Therapeutic activities, Neuromuscular re-education, Physical agent/modality, Manual therapy, Patient education and Self Care training  Patient / Family readiness to learn indicated by: asking questions, trying to perform skills and interest  Persons(s) to be included in education: patient (P)  Barriers to Learning/Limitations: None  Patient Goal (s): please see evaluation in [facility]  Patient Self Reported Health Status: please see paper chart  Rehabilitation Potential: good    Short Term Goals: To be accomplished in 5 treatments:  -Independent in HEP as evidenced on ability to perform at least 5 exercises from HEP using proper form without verbal cuing.   -Pain less than or equal to 4/10 at worst to allow patient to perform ADL's with greater ease  -Demostrate proper posture in order to decrease shoulder pain  -Pt will report compliance with icing 1-2x/day in order to decrease inflammation    Long Term Goals: To be accomplished in 10 treatments:  -AROM and PROM equal to contralateral side and pain-free  -MMT greater than or equal to 4+/5 all planes to allow patient to perform ADL's  -Pain 0/10 to allow patient to work at computer for 4 hours without shoulder pain. Frequency / Duration: Patient to be seen 2 times per week for 10-12 weeks. Patient/ Caregiver education and instruction: self care, activity modification and exercises    [x]  Plan of care has been reviewed with PTA    Certification Period: 10/15/2020 -  1/15/20    Patti Zacarias. Rhett PT, DPT, CMTPT      32/01/3700 8:35 PM  PT License Number: [id number]  _____________________________________________________________________    I certify that the above Therapy Services are being furnished while the patient is under my care. I agree with the treatment plan and certify that this therapy is necessary.     [de-identified] Signature:____________________  Date:____________Time:_________

## 2020-10-15 NOTE — PROGRESS NOTES
PT INITIAL EVALUATION NOTE - [facility] 2-15    Patient Name: Renée Sabillon  Date:10/15/2020  : 1960  [x]  Patient  Verified  Payor: [facility] MEDICARE COMPLETE / Plan: [facility] MEDICARE COMPLETE / Product Type: Managed Care Medicare /    In time:510 P   Out time:600 P  Total Treatment Time (min): 50 (25 eval, 15 timed, 10 modality see below)  Total Timed Codes (min): 15   1:1 Treatment Time ([facility]/[place]): 15    Visit #:1    Treatment Area: Pain in right shoulder [M25.511]    SUBJECTIVE  Any medication changes, allergies to medications, adverse drug reactions, diagnosis change, or new procedure performed?: [] No    [x] Yes (see summary sheet for update)    Pt with onset of right sided shoulder pain that started 6 mo ago  Originally thought it was RA  Saw MD-MD referred to PT  Has never struggled with arm pain before. No PATRICIA that pt can remember    Pain:   9/10 max 1-2/10 min 1-2/10 now     Location of symptoms: right UT, radiates to deltoid and radiates with most distal symptoms just prox to elbow  Description of symptoms: \"sore\" \"feels weak\"  \"feels swollen\"  Aggravated by: working at computer, reaching overhead. 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 Works out of her home. Wonders if her chair is problematic. Prior level of function/activity level: Able to work 4 hours at computer without arm pain. Patient goal: \"less or no pain.   Get to as close to normal as I can\"    OBJECTIVE    AROM cervical spine full and pain-free all sides    Observation/posture: (R) shoulder anterior compared to left shoulder    (R) shoulder                                                                                          AROM (in degrees) PROM (in degrees) Flexion WNL p! End range 145 p! Abd WNL p! End range 120 p! ER Equal to contrat side reaching behind neck p! 80 p! IR 3 inches less than contrat side reaching behind back p! 90 p! Joint mobility: GHJ posterior glide-two audible clicks/clunks noted. No pain noted. GHJ inferior glide dec. Decreased STJ downward rotation and retraction    Strength  *5/5 unless noted below                                                                 Shoulder Flexion 3+ p! Shoulder Abduction 4-   Shoulder ER 4-   Shoulder IR 4 p! Supraspinatus 4+     R elbow flexion: 4+ p! Elbow ext: 4+ p! Tenderness to palpation:  (R) UT, infraspinatus, teres major/minor    Special Tests: All negative unless checked  Painful arc  [] Pos   [] Neg   Neer's Test  [] Pos   [] Neg   Hawkin's Test  [x] Pos   [] Neg   Empty Can  [x] Pos   [] Neg  External Rotation Lag Sign [] Pos   [] Neg    Lift Off Test  [] Pos   [] Neg  Resisted Infraspinatus (ER @ 0 deg) [] Pos   [] Neg   Speed's Test  [x] Pos   [] Neg   Sulcus   [] Pos   [] Neg  Load and Shift  [] Pos   [] Neg  Apprehension  [] Pos   [] Neg   Relocation  [] Pos   [] Neg    OBJECTIVE  [x] Skin assessment post-treatment:  [x]intact []redness- no adverse reaction    []redness  adverse reaction:     15 min Manual:  [x] See flow sheet :GHJ posterior glide and inferior glide. PROM all planes to tolerance. Rationale: increase ROM and increase strength to improve the patients ability to reach overhead. 10 min [x]  Ice     []  Heat  Position:seated right elbow supported  Location: (R) shoulder supported   Rationale: decrease edema, decrease inflammation, decrease pain and reduce soreness post therapy in order to improve the patients ability to perform ADL's.            With   [x] TE   [] manual   [] self care    Patient Education: [x] Review HEP    [] Progressed/Changed HEP based on:   [] positioning   [] body mechanics   [] transfers   [x] Ice application- pt advised to ice 10-15 min 1-2 x/day to area in order to dec inflammation  [x] other:  re: mechanism of injury/condition, role of physical therapy, prognosis for recovery, heat vs ice, activity modifications. Education re:     *ergonomics while sitting at desk for work-juan manuel with (R) arm with mouse usage. *ice  *posture     Pain Level (0-10 scale) post treatment: 0    Note: pt with AROM and PROM pain-free post manual.  \"I feel like I have a new shoulder-do I have to come back? \"    ASSESSMENT/Changes in Function:     [x]  See [facility].  Rhett PT, DPT, LIOPT  PT License Number: [id number]   10/15/2020  4:39 PM

## 2020-10-20 DIAGNOSIS — M05.79 SEROPOSITIVE RHEUMATOID ARTHRITIS OF MULTIPLE SITES (HCC): ICD-10-CM

## 2020-10-22 ENCOUNTER — HOSPITAL ENCOUNTER (OUTPATIENT)
Dept: PHYSICAL THERAPY | Age: 60
Discharge: HOME | End: 2020-10-22
Payer: MEDICARE

## 2020-10-22 PROCEDURE — 97140 MANUAL THERAPY 1/> REGIONS: CPT | Performed by: PHYSICAL THERAPIST

## 2020-10-22 PROCEDURE — 97110 THERAPEUTIC EXERCISES: CPT | Performed by: PHYSICAL THERAPIST

## 2020-10-22 RX ORDER — METHOTREXATE 25 MG/ML
25 INJECTION, SOLUTION INTRA-ARTERIAL; INTRAMUSCULAR; INTRAVENOUS
Qty: 12 ML | Refills: 0
Start: 2020-10-26 | End: 2020-10-27 | Stop reason: SDUPTHER

## 2020-10-22 NOTE — PROGRESS NOTES
PT DAILY TREATMENT NOTE - [facility] 2-15    Patient Name: Alpa Barry  TNKQ:-  : 1960  [x]  Patient  Verified  Payor: Taj Fernnado / Plan: [facility] MEDICARE COMPLETE / Product Type: Managed Care Medicare /    In time:1245  Out time:125  Total Treatment Time (min): 40  Total Timed Codes (min): 40  1:1 Treatment Time ( [address] only): 40   Visit #: 2    Treatment Area: Pain in right shoulder [M25.511]    SUBJECTIVE  Pain Level (0-10 scale), subjective functional status/changes: Pt reports pain is 3-4/10. Pt reports that she felt great after leaving last appointment then drove to [place] and back and pain returned. She admits she has not really changed her work station set up yet. Any medication changes, allergies to medications, adverse drug reactions, diagnosis change, or new procedure performed?: [x] No    [] Yes (see summary sheet for update) SEE ABOVE. OBJECTIVE     Palpation:  TTP R UT muscles, R medial periscapular muscles, R supraspinatus muscles. Pt decline min Modality:      []  Ice     []  Heat       Position/location:   -   Rationale: decrease pain to improve the patients ability to do functional activities   [x] Skin assessment post-treatment:  [x]intact []redness- no adverse reaction    []redness  adverse reaction:      25 min Therapeutic Exercise:  [x] See flow sheet :extensive review of posture while at computer work station. Rationale: increase strength, improve coordination and increase proprioception to improve the patients ability to working at computer, reaching OH    15 min Manual Therapy:  Inferior, posterior [address] [facility] (pt supine). Posterior GH glide in slidelying. STM to medial periscapular muscles. Rationale: decrease pain, increase tissue extensibility and decrease trigger points  to improve the patients ability to see above.             With   [] TE   [] TA   [] neuro   [] other: Patient Education: [x] Review HEP    [] Progressed/Changed HEP based on:   [] positioning   [] body mechanics   [] transfers   [] heat/ice application    [] other:        Pain Level (0-10 scale) post treatment: 2    ASSESSMENT/Changes in Function:   Pt with slightly increased pain today but did have significant relief after evaluation. Popping/clicking and pain with S/L ER even in very limited range so discontinued. Provided HEP as noted above. Patient will continue to benefit from skilled PT services to modify and progress therapeutic interventions, address ROM deficits, address strength deficits, analyze and address soft tissue restrictions, assess and modify postural abnormalities and instruct in home and community integration to attain remaining goals. Progress towards goals / Updated goals:  Goals were not re-assessed today.      PLAN      [x]  Continue plan of care    []  Other:_      Karyn Zelaya, PT SUZIET, [place] 10/22/2020  3:81 PM       PT License #[id number]

## 2020-10-27 DIAGNOSIS — M05.79 SEROPOSITIVE RHEUMATOID ARTHRITIS OF MULTIPLE SITES (HCC): ICD-10-CM

## 2020-10-27 RX ORDER — METHOTREXATE 25 MG/ML
25 INJECTION, SOLUTION INTRA-ARTERIAL; INTRAMUSCULAR; INTRAVENOUS
Qty: 12 ML | Refills: 0
Start: 2020-11-02

## 2020-10-27 RX ORDER — METHOTREXATE 25 MG/ML
25 INJECTION, SOLUTION INTRA-ARTERIAL; INTRAMUSCULAR; INTRAVENOUS
Qty: 12 ML | Refills: 0
Start: 2020-11-02 | End: 2020-10-28 | Stop reason: SDUPTHER

## 2020-10-28 DIAGNOSIS — M05.79 SEROPOSITIVE RHEUMATOID ARTHRITIS OF MULTIPLE SITES (HCC): ICD-10-CM

## 2020-10-28 RX ORDER — METHOTREXATE 25 MG/ML
25 INJECTION, SOLUTION INTRA-ARTERIAL; INTRAMUSCULAR; INTRAVENOUS
Qty: 12 VIAL | Refills: 1 | Status: SHIPPED | OUTPATIENT
Start: 2020-11-02 | End: 2021-02-13 | Stop reason: SDUPTHER

## 2020-10-29 ENCOUNTER — APPOINTMENT (OUTPATIENT)
Dept: PHYSICAL THERAPY | Age: 60
End: 2020-10-29
Payer: MEDICARE

## 2020-11-03 RX ORDER — CALCIUM CARB/VITAMIN D3/VIT K1 500-100-40
TABLET,CHEWABLE ORAL
Qty: 12 SYRINGE | Refills: 4 | Status: SHIPPED | OUTPATIENT
Start: 2020-11-03 | End: 2021-02-13 | Stop reason: SDUPTHER

## 2020-11-16 ENCOUNTER — PATIENT MESSAGE (OUTPATIENT)
Dept: FAMILY MEDICINE CLINIC | Age: 60
End: 2020-11-16

## 2020-11-17 NOTE — TELEPHONE ENCOUNTER
From: Chance Roe  To:  Valerio Livingston MD  Sent: 11/16/2020 7:11 PM EST  Subject: Non-Urgent Medical Question    Please change the location where I fill my prescriptions    [address]    [phone]    Thank you    Nilda Dunne

## 2021-01-26 NOTE — ANCILLARY DISCHARGE INSTRUCTIONS
[facility] Physical Therapy [address] [place], [address] Phone: [phone] Fax: [phone] Discharge Summary 2-15 Patient name: Diamante Steven  : 1960 Provider#: [id number] Referral source: Figueroa Fraga MD     
Medical/Treatment Diagnosis: Pain in right shoulder [M25.511] Prior Hospitalization: see medical history Comorbidities: see evaluation Prior Level of Function:see evaluation Medications: Verified on Patient Summary List 
 
Start of Care: 10/15/20    Onset Date:see evaluation Visits from Start of Care: 2   Missed Visits:see connect care Reporting Period : 10/15/20 to 10/22/20 Summary of care:  
 
Pt did not return for scheduled follow-up appointments. Current status of pt unknown. RECOMMENDATIONS: 
[x]Discontinue therapy:[]Patient has reached or is progressing toward set goals [x]Patient is non-compliant or has abdicated 
   []Due to lack of appreciable progress towards set goals Aga Gibbons.  Rhett PT, DPT, CMTPT  2021 3:28 PM

## 2021-02-10 DIAGNOSIS — M05.79 SEROPOSITIVE RHEUMATOID ARTHRITIS OF MULTIPLE SITES (HCC): ICD-10-CM

## 2021-02-10 RX ORDER — TOFACITINIB 11 MG/1
TABLET, FILM COATED, EXTENDED RELEASE ORAL
Qty: 90 TAB | Refills: 5 | Status: SHIPPED | OUTPATIENT
Start: 2021-02-10

## 2021-02-13 DIAGNOSIS — E55.9 VITAMIN D DEFICIENCY: ICD-10-CM

## 2021-02-13 DIAGNOSIS — Z91.09 ENVIRONMENTAL ALLERGIES: ICD-10-CM

## 2021-02-13 DIAGNOSIS — M05.79 SEROPOSITIVE RHEUMATOID ARTHRITIS OF MULTIPLE SITES (HCC): ICD-10-CM

## 2021-02-13 RX ORDER — CETIRIZINE HCL 10 MG
10 TABLET ORAL DAILY
Qty: 90 TAB | Refills: 3 | Status: CANCELLED | OUTPATIENT
Start: 2021-02-13

## 2021-02-16 RX ORDER — FOLIC ACID 1 MG/1
1 TABLET ORAL DAILY
Qty: 90 TAB | Refills: 5 | Status: SHIPPED | OUTPATIENT
Start: 2021-02-16 | End: 2021-06-03 | Stop reason: SDUPTHER

## 2021-02-16 RX ORDER — METHOTREXATE 25 MG/ML
25 INJECTION, SOLUTION INTRA-ARTERIAL; INTRAMUSCULAR; INTRAVENOUS
Qty: 12 VIAL | Refills: 1 | Status: SHIPPED | OUTPATIENT
Start: 2021-02-22 | End: 2021-07-06

## 2021-02-16 RX ORDER — ERGOCALCIFEROL 1.25 MG/1
50000 CAPSULE ORAL
Qty: 12 CAP | Refills: 4 | Status: SHIPPED | OUTPATIENT
Start: 2021-02-16

## 2021-02-16 RX ORDER — CALCIUM CARB/VITAMIN D3/VIT K1 500-100-40
TABLET,CHEWABLE ORAL
Qty: 12 SYRINGE | Refills: 4 | Status: ON HOLD | OUTPATIENT
Start: 2021-02-16 | End: 2021-05-26

## 2021-02-16 NOTE — TELEPHONE ENCOUNTER
Duplicate request - Zyrtec 10 mg #90 with 3 refills was sent to [facility] on 10/08/2020. Requested Prescriptions     Pending Prescriptions Disp Refills    cetirizine (ZYRTEC) 10 mg tablet 90 Tab 3     Sig: Take 1 Tab by mouth daily.

## 2021-03-12 ENCOUNTER — PATIENT MESSAGE (OUTPATIENT)
Dept: FAMILY MEDICINE CLINIC | Age: 61
End: 2021-03-12

## 2021-03-12 NOTE — TELEPHONE ENCOUNTER
From: Catherine Roe  To: Dex Rivera MD  Sent: 3/12/2021 10:47 AM EST  Subject: Prescription Question    Good Morning,    On or about July 7th I advised Dr. Lux/ARTEMIO, that I wasn't taking fluticasone propionate anymore and it was taken off my prescription list.  My allergies have been constant for me @ this time, and I would appreciate it if a prescription can be sent it for me.    Thank you    Catherine Roe

## 2021-03-12 NOTE — TELEPHONE ENCOUNTER
Pt requesting refill for flonase. Pt last seen by Dr. Toño Rodriguez 08/2020 for wellness visit. Please advise.

## 2021-03-15 RX ORDER — FLUTICASONE PROPIONATE 50 MCG
2 SPRAY, SUSPENSION (ML) NASAL DAILY
Qty: 1 BOTTLE | Refills: 2 | Status: CANCELLED | OUTPATIENT
Start: 2021-03-15

## 2021-03-15 RX ORDER — FLUTICASONE PROPIONATE 50 MCG
2 SPRAY, SUSPENSION (ML) NASAL DAILY
Qty: 1 BOTTLE | Refills: 2 | Status: SHIPPED | OUTPATIENT
Start: 2021-03-15 | End: 2021-03-15 | Stop reason: SDUPTHER

## 2021-03-16 RX ORDER — FLUTICASONE PROPIONATE 50 MCG
2 SPRAY, SUSPENSION (ML) NASAL DAILY
Qty: 1 BOTTLE | Refills: 2 | Status: SHIPPED | OUTPATIENT
Start: 2021-03-16 | End: 2021-10-08

## 2021-03-16 NOTE — TELEPHONE ENCOUNTER
Dr. Chuck Miller,          Pt is requesting a new prescription to be sent to [facility]. Previous prescription was sent to [facility]. Thank you. Requested Prescriptions     Pending Prescriptions Disp Refills    fluticasone propionate (FLONASE) 50 mcg/actuation nasal spray 1 Bottle 2     Si Sprays by Both Nostrils route daily.

## 2021-03-16 NOTE — TELEPHONE ENCOUNTER
Duplicate request - Flonase is being processed in a separate encounter. Requested Prescriptions     Pending Prescriptions Disp Refills    fluticasone propionate (FLONASE) 50 mcg/actuation nasal spray 1 Bottle 2     Si Sprays by Both Nostrils route daily.

## 2021-04-07 ENCOUNTER — OFFICE VISIT (OUTPATIENT)
Dept: RHEUMATOLOGY | Age: 61
End: 2021-04-07

## 2021-04-07 ENCOUNTER — LAB ONLY (OUTPATIENT)
Dept: FAMILY MEDICINE CLINIC | Age: 61
End: 2021-04-07

## 2021-04-07 VITALS
HEIGHT: 62 IN | DIASTOLIC BLOOD PRESSURE: 91 MMHG | BODY MASS INDEX: 35.74 KG/M2 | WEIGHT: 194.2 LBS | SYSTOLIC BLOOD PRESSURE: 143 MMHG | OXYGEN SATURATION: 97 % | TEMPERATURE: 98.2 F | HEART RATE: 64 BPM

## 2021-04-07 DIAGNOSIS — E78.5 DYSLIPIDEMIA: ICD-10-CM

## 2021-04-07 DIAGNOSIS — E55.9 VITAMIN D DEFICIENCY: ICD-10-CM

## 2021-04-07 DIAGNOSIS — Z79.60 LONG-TERM USE OF IMMUNOSUPPRESSANT MEDICATION: ICD-10-CM

## 2021-04-07 DIAGNOSIS — M05.79 SEROPOSITIVE RHEUMATOID ARTHRITIS OF MULTIPLE SITES (HCC): ICD-10-CM

## 2021-04-07 DIAGNOSIS — M05.79 SEROPOSITIVE RHEUMATOID ARTHRITIS OF MULTIPLE SITES (HCC): Primary | ICD-10-CM

## 2021-04-07 PROCEDURE — G8417 CALC BMI ABV UP PARAM F/U: HCPCS | Performed by: INTERNAL MEDICINE

## 2021-04-07 PROCEDURE — G8427 DOCREV CUR MEDS BY ELIG CLIN: HCPCS | Performed by: INTERNAL MEDICINE

## 2021-04-07 PROCEDURE — 99215 OFFICE O/P EST HI 40 MIN: CPT | Performed by: INTERNAL MEDICINE

## 2021-04-07 PROCEDURE — G8432 DEP SCR NOT DOC, RNG: HCPCS | Performed by: INTERNAL MEDICINE

## 2021-04-07 PROCEDURE — 3017F COLORECTAL CA SCREEN DOC REV: CPT | Performed by: INTERNAL MEDICINE

## 2021-04-07 NOTE — PROGRESS NOTES
REASON FOR VISIT    This is a follow-up visit for Ms. Roe for    ICD-10-CM   1. Seropositive rheumatoid arthritis of multiple sites ([facility] M05.79     Inflammatory arthritis phenotype includes:  Anti-CCP positive: yes (>250)  Rheumatoid factor positive: yes (51.6)  Erosive disease: no  Extra-articular manifestations include: bibasilar scarring    Immunosuppression Screening (1/07/2020):  Quantiferon TB: indeterminate  PPD:  Negative (1/10/2018)  Hepatitis B: negative  Hepatitis C: negative    Therapy History includes:  Current DMARD therapy includes: methotrexate 25 mg SubQ every Monday (9/28/2020 to present), Marlyce Bach 11 mg XR (3/27/2018 to present)  Prior DMARD therapy includes: methotrexate 20 mg every Monday (3/2015 to 9/28/2020), Enbrel (6 weeks)  The following DMARDs have been ineffective: none  The following DMARDs were stopped because of side effects: Enbrel (injection site reaction, increased cough, other unknown)    HISTORY OF PRESENT ILLNESS    Ms. Sunita Rogers returns for a follow-up. On her last visit, I continued methotrexate 25 mg SubQ every Monday and Xeljanz 11 mg XR daily, which she has taken with good tolerance. She started it on 9/28/2020 without issues. Today, she feels ok. She denies pain, swelling, or stiffness in her hands or wrists. Around 3 weeks ago, she developed painful dark bruise on her distal outer wrist that did not last long. She is thinking about COVID vaccine and has questions about it. She endorses easy bruising at the injection site from methotrexate.     She denies fever, weight loss, blurred vision, vision loss, oral ulcers, ankle swelling, dry cough, dyspnea, nausea, vomiting, dysphagia, abdominal pain, black or bloody stool, fall since last visit, rash and increased thirst.    Most recent toxicity monitoring by blood work was done on 7/13/2020 and did not reveal any significant adverse effects, except Hct 34.3%,     Most recent inflammatory markers from 7/13/2020 revealed a ESR 46 mm/hr and CRP 8 mg/L. The patient has not had any interval hospital admissions, infections, or surgeries. REVIEW OF SYSTEMS    A comprehensive review of systems was performed and pertinent results are documented in the HPI, review of systems is otherwise non-contributory. PAST MEDICAL HISTORY    She has a past medical history of Anemia, Reactive airway disease, and Rheumatoid arthritis ([address] (10/24/2017). FAMILY HISTORY    Her family history includes Heart Disease in her father; High Cholesterol in her mother; Thyroid Disease in her mother. SOCIAL HISTORY    She reports that she quit smoking about 9 years ago. She has never used smokeless tobacco. She reports that she does not drink alcohol or use drugs. MEDICATIONS    Current Outpatient Medications   Medication Sig    fluticasone propionate (FLONASE) 50 mcg/actuation nasal spray 2 Sprays by Both Nostrils route daily.  folic acid (FOLVITE) 1 mg tablet Take 1 Tab by mouth daily.  ergocalciferol (ERGOCALCIFEROL) 1,250 mcg (50,000 unit) capsule Take 1 Cap by mouth every seven (7) days. Indications: vitamin D deficiency (high dose therapy)    Insulin Syringe-Needle U-100 (BD Insulin Syringe Ultra-Fine) 1 mL 31 gauge x 5/16 syrg Use to inject methotrexate once weekly    methotrexate 25 mg/mL chemo injection 1 mL by SubCUTAneous route every Monday.  tofacitinib (Xeljanz XR) 11 mg Tb24 Take 1 tablet by mouth every day    cetirizine (ZYRTEC) 10 mg tablet Take 1 Tab by mouth daily.  polyethylene glycol (MIRALAX) 17 gram/dose powder Take 17 g by mouth daily as needed (constipation). Prn constipation    Ibuprofen-diphenhydrAMINE (ADVIL PM) 200-38 mg tab Take 200 mg by mouth as needed.  methotrexate 25 mg/mL chemo injection 1 mL by SubCUTAneous route every Monday.     varicella-zoster recombinant, PF, (Shingrix, PF,) 50 mcg/0.5 mL susr injection 0.5mL by IntraMUSCular route once now and then repeat in 2-6 months     No current facility-administered medications for this visit. ALLERGIES    No Known Allergies    PHYSICAL EXAMINATION    Visit Vitals  BP (!) 143/91 (BP 1 Location: Right arm, BP Patient Position: Sitting, BP Cuff Size: Large adult)   Pulse 64   Temp 98.2 °F (36.8 °C) (Oral)   Ht 5' 2\" (1.575 m)   Wt 194 lb 3.2 oz (88.1 kg)   SpO2 97%   BMI 35.52 kg/m²     Body mass index is 35.52 kg/m². General: Patient is alert, oriented x 3, not in acute distress    HEENT:   Sclerae are not injected and appear moist.  There is no alopecia. Chest:  Breathing comfortably comfortably at room air    Neurological exam:  Muscle strength is full in upper and lower extremities     Extremities:  Free of clubbing, cyanosis    Skin exam:    There are no rashes, no alopecia, no discoid lesions, no active Raynaud's, no livedo reticularis, no periungual erythema. Musculoskeletal exam:  A comprehensive musculoskeletal exam was performed for all joints of each upper and lower extremity and assessed for swelling, tenderness and range of motion.  Positive results are documented as below:    Right trochanteric tenderness  Right iliotibial band tenderness  Bilateral knee crepitus without effusion with hyperextension    Z-Deformities:   no  Canadian Neck Deformities:  no  Boutonierre's Deformities:  no  Ulnar Deviation:   no  MCP Subluxation:  no     Joint Count 4/7/2021 10/7/2020 7/7/2020 1/7/2020 5/24/2019 11/6/2018 8/6/2018   Patient pain (0-100) 1.5 20 40 40 60 10 85   MHAQ 0.375 0 1 0 0.875 0 0.25   Left wrist- Tender - 0 - - - - -   Left wrist- Swollen - 0 - - - - -   Left 1st MCP - Swollen - - - - - - -   Left 2nd PIP - Tender - - 1 - - - -   Left 2nd PIP - Swollen - - 0 - - - -   Left 3rd PIP - Tender - - 1 - - - 0   Left 3rd PIP - Swollen - - 0 - - - 1   Left 4th PIP - Tender - - 1 - - - -   Left 4th PIP - Swollen - - 0 - - - -   Left 5th PIP - Tender - - 1 - - - -   Left 5th PIP - Swollen - - 0 - - - -   Right shoulder - Tender - - - 1 - 1 -   Right shoulder - Swollen - - - 0 - 0 -   Right elbow - Tender - - - - - - 1   Right elbow - Swollen - - - - - - 1   Right wrist- Tender - - - - - - -   Right wrist- Swollen - - - - - - -   Right 1st MCP - Swollen - - - - - - -   Right 3rd MCP - Swollen - - - - - - 1   Right 4th MCP - Tender - - - - - - -   Right 4th MCP - Swollen - - - - - - 1   Right 2nd PIP - Tender - - 1 - - - -   Right 2nd PIP - Swollen - - 0 - - - -   Right 3rd PIP - Tender - - 1 - 1 - 1   Right 3rd PIP - Swollen - - 0 - 0 - 1   Right 4th PIP - Tender - - 1 - - - -   Right 4th PIP - Swollen - - 0 - - - -   Right 5th PIP - Tender - - 1 - - - -   Right 5th PIP - Swollen - - 0 - - - -   Tender Joint Count (Total) - 0 8 1 1 1 2   Swollen Joint Count (Total) - 0 0 0 0 0 5   Physician Assessment (0-10) - 0 2 0.5 0.5 0 3   Patient Assessment (0-10) 1.5 5 5 2 1 1 5.5   CDAI Total (calculated) - 5 15 3.5 2.5 2 15.5     DATA REVIEW    Laboratory     Recent laboratory results were reviewed, summarized, and discussed with the patient. Imaging    Musculoskeletal Ultrasound    Ultrasound of the right elbow. Indication: joint pain. (12/27/17)  Using a [employer] e with 12 Mhz probe, limited views of the right elbow were reviewed. This revealed hypoechoic dopplerable collection and anechoic collection within the olecranon fossa. The tendons were normal. Bony contours were regular without erosions seen. There were no soft tissue masses noted. Impression: right elbow synovitis with effusion    Radiographs    Chest 3/27/2018: clear lungs. There is a small calcification or bone island overlying the left costophrenic angle. Heart size is normal. There is no pulmonary edema. There is no evident pneumothorax, adenopathy or effusion. Bilateral Hand 12/27/2017: mild diffuse periarticular osteopenia. The joint space widths are normal. No erosive changes are shown. There is anatomic alignment.  No pathological soft tissue calcifications or demonstrated. No localized areas of soft tissue swelling is shown. Right Elbow 12/27/2017: mild diffuse osteopenia. There is a small elbow effusion. There is mild to moderate uniform joint space narrowing which is greatest between the capitellum and radial head. No fracture is shown. No erosive changes are evident. Bilateral Foot 12/27/2017: mild diffuse periarticular osteopenia. Minimal joint space widths are demonstrated bilaterally. There are no erosive changes. No pathologic soft tissue calcifications or localized areas of soft tissue swelling are demonstrated. There is anatomic alignment. Deformity of the distal shaft of the left fifth metatarsal bone is shown suggesting remote, healed fracture. There is no evidence for acute fracture. CT Imaging    CT Chest without contrast 4/03/2018: The thyroid gland reveals no nodules. There is no axillary adenopathy. There are no enlarged mediastinal or hilar lymph nodes. There are no pleural or  ericardial effusions. No adrenal lesions. The lungs are well aerated. The central airways are patent. There are minimal linear areas of scarring at each lung base. No pneumonia. No pulmonary edema no evidence for fibrosis. Review of bone windows reveals no destructive lesions. MR Imaging    None    DXA    None    PFT    PFT 4/03/2018: FVC of 2.32 (90%), FEV1 of 1.73 (91%), FEV1/FVC of 74.8% and a DLCO of 15.11 (>80%), TLC 3.88 (96%), VC 2.28 (81%). PATHOLOGY    Right Shoulder Kenalog 40 mg IA. (04/02/2020)   Kenalog 80 mg IM. (08/06/18)   Ultrasound Guided Right Elbow Kenalog 40 mg IA. (12/27/2017)    ASSESSMENT AND PLAN    This is a follow-up visit for Ms. Roe. 1) Seropositive Rheumatoid Arthritis. She is maintained on methotrexate 25 mg SubQ every Monday and Xeljanz 11 mg XR with good tolerance. She denies inflammatory joint symptoms.     Her CDAI was 1.5 (previously 5, 15, 3.5, 2.5, 2, 15.5, 23.5, 14.5, 6) with 0 tender and 0 swollen joints, consistent with low disease activity. I will continue treatment. We discussed COVID vaccinations in details, including benefits. 2) Long Term Use of Immunosuppressants. The patient remains on high risk immunomodulatory medications (methotrexate, Sherrel Persons) and requires frequent toxicity monitoring by blood work to evaluate for toxicities. Respective labs were ordered (see below orders for details). 3) Bilateral Knee Osteoarthritis. This was not an active issue today. 4) Intermittent Cough. This is chronic and she attributes to allergies. She was evaluated by pulmonology in the past and was told she had scar tissue in her left lung. Exam revealed bibasilar crackles. CT Chest showed bibasilar crackles without concerning findings for interstitial lung disease. PFT showed mild small airway obstruction. She was instructed to follow up with pulmonology. This is not an active issue today. 5) Vitamin D Deficiency. Her level was 47.2 (previously 21.2, 32.8, 21.3). She is on ergocalciferol weekly. I will check her level. 6) Right Trochanteric Bursitis with Right Iliotibial Band Syndrome. This was not na active issue today. I had referred her to physical therapy. 7) Right Shoulder Pain. This was not na active issue today. I had referred her to physical therapy. The patient voiced understanding of the aforementioned assessment and plan. A total of 42 minutes was spent on this visit, reviewing interval notes, interval testing results, ordering tests, refilling medications, documenting the findings in the note, patient education, counseling, and coordination of care as described above. All questions asked and answered.     TODAY'S ORDERS    Orders Placed This Encounter    QUANTIFERON-TB GOLD PLUS    CBC WITH AUTOMATED DIFF    METABOLIC PANEL, COMPREHENSIVE    C REACTIVE PROTEIN, QT    SED RATE (ESR)    VITAMIN D, 25 HYDROXY    CHRONIC HEPATITIS PANEL    METHOTREXATE POLYGLUTAMATES    LIPID PANEL     Future Appointments   Date Time Provider Yolanda Neal   10/7/2021  1:20 PM Wilian Chan MD [facility] BS AMB     Cydney Ford MD, [address] [facility]    Adult Rheumatology   Rheumatology Ultrasound Certified  [facility]  A Part of [facility], [address] [place] [address] Road   Phone [phone]  Fax [phone]

## 2021-04-07 NOTE — LETTER
4/7/2021 Patient: Peyman Omalley YOB: 1960 Date of Visit: 4/7/2021 Surya Thompson MD 
[address] [facility], [place] [address] [id number] [address] [employer] Dear Surya Thompson MD, Thank you for referring Ms. Peyman Omalley to [address] [place] [address] for evaluation. My notes for this consultation are attached. If you have questions, please do not hesitate to call me. I look forward to following your patient along with you.  
 
 
Sincerely, 
 
Sandrine Brantley MD

## 2021-04-07 NOTE — PROGRESS NOTES
Chief Complaint   Patient presents with    Arthritis     Right shoulder     Visit Vitals  BP (!) 143/91 (BP 1 Location: Right arm, BP Patient Position: Sitting, BP Cuff Size: Large adult)   Pulse 64   Temp 98.2 °F (36.8 °C) (Oral)   Ht 5' 2\" (1.575 m)   Wt 194 lb 3.2 oz (88.1 kg)   SpO2 97%   BMI 35.52 kg/m²     1. Have you been to the ER, urgent care clinic since your last visit? Hospitalized since your last visit?no    2. Have you seen or consulted any other health care providers outside of the [address] [place] [address] since your last visit? Include any pap smears or colon screening.  no

## 2021-04-08 LAB
25(OH)D3 SERPL-MCNC: 66.8 NG/ML (ref 30–100)
ALBUMIN SERPL-MCNC: 4.2 G/DL (ref 3.5–5)
ALBUMIN/GLOB SERPL: 1.3 {RATIO} (ref 1.1–2.2)
ALP SERPL-CCNC: 126 U/L (ref 45–117)
ALT SERPL-CCNC: 20 U/L (ref 12–78)
ANION GAP SERPL CALC-SCNC: 3 MMOL/L (ref 5–15)
AST SERPL-CCNC: 21 U/L (ref 15–37)
BASOPHILS # BLD: 0 K/UL (ref 0–0.1)
BASOPHILS NFR BLD: 1 % (ref 0–1)
BILIRUB SERPL-MCNC: 0.3 MG/DL (ref 0.2–1)
BUN SERPL-MCNC: 17 MG/DL (ref 6–20)
BUN/CREAT SERPL: 23 (ref 12–20)
CALCIUM SERPL-MCNC: 9.2 MG/DL (ref 8.5–10.1)
CHLORIDE SERPL-SCNC: 105 MMOL/L (ref 97–108)
CHOLEST SERPL-MCNC: 248 MG/DL
CO2 SERPL-SCNC: 29 MMOL/L (ref 21–32)
CREAT SERPL-MCNC: 0.74 MG/DL (ref 0.55–1.02)
CRP SERPL-MCNC: 0.64 MG/DL (ref 0–0.6)
DIFFERENTIAL METHOD BLD: ABNORMAL
EOSINOPHIL # BLD: 0.1 K/UL (ref 0–0.4)
EOSINOPHIL NFR BLD: 1 % (ref 0–7)
ERYTHROCYTE [DISTWIDTH] IN BLOOD BY AUTOMATED COUNT: 14.6 % (ref 11.5–14.5)
ERYTHROCYTE [SEDIMENTATION RATE] IN BLOOD: 42 MM/HR (ref 0–30)
GLOBULIN SER CALC-MCNC: 3.3 G/DL (ref 2–4)
GLUCOSE SERPL-MCNC: 87 MG/DL (ref 65–100)
HCT VFR BLD AUTO: 38.4 % (ref 35–47)
HDLC SERPL-MCNC: 69 MG/DL
HDLC SERPL: 3.6 {RATIO} (ref 0–5)
HGB BLD-MCNC: 11.4 G/DL (ref 11.5–16)
IMM GRANULOCYTES # BLD AUTO: 0 K/UL (ref 0–0.04)
IMM GRANULOCYTES NFR BLD AUTO: 0 % (ref 0–0.5)
LDLC SERPL CALC-MCNC: 152.8 MG/DL (ref 0–100)
LIPID PROFILE,FLP: ABNORMAL
LYMPHOCYTES # BLD: 1.3 K/UL (ref 0.8–3.5)
LYMPHOCYTES NFR BLD: 16 % (ref 12–49)
MCH RBC QN AUTO: 28.5 PG (ref 26–34)
MCHC RBC AUTO-ENTMCNC: 29.7 G/DL (ref 30–36.5)
MCV RBC AUTO: 96 FL (ref 80–99)
MONOCYTES # BLD: 0.6 K/UL (ref 0–1)
MONOCYTES NFR BLD: 7 % (ref 5–13)
NEUTS SEG # BLD: 5.9 K/UL (ref 1.8–8)
NEUTS SEG NFR BLD: 75 % (ref 32–75)
NRBC # BLD: 0 K/UL (ref 0–0.01)
NRBC BLD-RTO: 0 PER 100 WBC
PLATELET # BLD AUTO: 315 K/UL (ref 150–400)
PMV BLD AUTO: 10.6 FL (ref 8.9–12.9)
POTASSIUM SERPL-SCNC: 4 MMOL/L (ref 3.5–5.1)
PROT SERPL-MCNC: 7.5 G/DL (ref 6.4–8.2)
RBC # BLD AUTO: 4 M/UL (ref 3.8–5.2)
SODIUM SERPL-SCNC: 137 MMOL/L (ref 136–145)
TRIGL SERPL-MCNC: 131 MG/DL (ref ?–150)
VLDLC SERPL CALC-MCNC: 26.2 MG/DL
WBC # BLD AUTO: 8 K/UL (ref 3.6–11)

## 2021-04-09 LAB
COMMENT, 144067: NORMAL
HBV CORE AB SERPL QL IA: NEGATIVE
HBV CORE IGM SERPL QL IA: NEGATIVE
HBV E AB SERPL QL IA: NEGATIVE
HBV E AG SERPL QL IA: NEGATIVE
HBV SURFACE AB SER QL: NON REACTIVE INDEX VALUE
HBV SURFACE AG SERPL QL IA: NEGATIVE
HCV AB S/CO SERPL IA: <0.1 S/CO RATIO (ref 0–0.9)

## 2021-04-12 LAB
M TB IFN-G BLD-IMP: NEGATIVE
QUANTIFERON CRITERIA, QFI1T: NORMAL
QUANTIFERON INCUBATION, QF1T: NORMAL
QUANTIFERON MITOGEN VALUE: 6.4 IU/ML
QUANTIFERON NIL VALUE: 0.18 IU/ML
QUANTIFERON TB1 AG: 0.17 IU/ML
QUANTIFERON TB2 AG: 0.1 IU/ML

## 2021-04-19 NOTE — PROGRESS NOTES
The results were reviewed. Vitamin D is high at 66.8 --> change dosing to 14 days. Elevated inflammatory markers (ESR 42 mm/hr, CRP 6.4 mg/L, ). Elevated cholesterol 248 mg/dL, .8 mg/dL --> discuss with your PCP. All remaining labs are normal/negative.

## 2021-04-28 ENCOUNTER — PATIENT MESSAGE (OUTPATIENT)
Dept: RHEUMATOLOGY | Age: 61
End: 2021-04-28

## 2021-04-28 DIAGNOSIS — G89.29 CHRONIC RIGHT SHOULDER PAIN: Primary | ICD-10-CM

## 2021-04-28 DIAGNOSIS — M25.511 CHRONIC RIGHT SHOULDER PAIN: Primary | ICD-10-CM

## 2021-05-25 ENCOUNTER — APPOINTMENT (OUTPATIENT)
Dept: CT IMAGING | Age: 61
End: 2021-05-25
Attending: STUDENT IN AN ORGANIZED HEALTH CARE EDUCATION/TRAINING PROGRAM
Payer: MEDICARE

## 2021-05-25 ENCOUNTER — APPOINTMENT (OUTPATIENT)
Dept: ULTRASOUND IMAGING | Age: 61
End: 2021-05-25
Attending: STUDENT IN AN ORGANIZED HEALTH CARE EDUCATION/TRAINING PROGRAM
Payer: MEDICARE

## 2021-05-25 ENCOUNTER — HOSPITAL ENCOUNTER (OUTPATIENT)
Age: 61
Setting detail: OBSERVATION
Discharge: HOME | End: 2021-05-27
Attending: STUDENT IN AN ORGANIZED HEALTH CARE EDUCATION/TRAINING PROGRAM | Admitting: SURGERY
Payer: MEDICARE

## 2021-05-25 ENCOUNTER — ANESTHESIA (OUTPATIENT)
Dept: SURGERY | Age: 61
End: 2021-05-25
Payer: MEDICARE

## 2021-05-25 ENCOUNTER — ANESTHESIA EVENT (OUTPATIENT)
Dept: SURGERY | Age: 61
End: 2021-05-25
Payer: MEDICARE

## 2021-05-25 DIAGNOSIS — K45.8 INTERNAL HERNIA: Primary | ICD-10-CM

## 2021-05-25 LAB
ALBUMIN SERPL-MCNC: 3.9 G/DL (ref 3.5–5)
ALBUMIN/GLOB SERPL: 1 {RATIO} (ref 1.1–2.2)
ALP SERPL-CCNC: 114 U/L (ref 45–117)
ALT SERPL-CCNC: 21 U/L (ref 12–78)
ANION GAP SERPL CALC-SCNC: 5 MMOL/L (ref 5–15)
APPEARANCE UR: CLEAR
AST SERPL-CCNC: 26 U/L (ref 15–37)
BACTERIA URNS QL MICRO: NEGATIVE /HPF
BASOPHILS # BLD: 0 K/UL (ref 0–0.1)
BASOPHILS NFR BLD: 0 % (ref 0–1)
BILIRUB SERPL-MCNC: 0.4 MG/DL (ref 0.2–1)
BILIRUB UR QL: NEGATIVE
BUN SERPL-MCNC: 13 MG/DL (ref 6–20)
BUN/CREAT SERPL: 17 (ref 12–20)
CALCIUM SERPL-MCNC: 9.7 MG/DL (ref 8.5–10.1)
CHLORIDE SERPL-SCNC: 104 MMOL/L (ref 97–108)
CO2 SERPL-SCNC: 28 MMOL/L (ref 21–32)
COLOR UR: ABNORMAL
COMMENT, HOLDF: NORMAL
COVID-19 RAPID TEST, COVR: NOT DETECTED
CREAT SERPL-MCNC: 0.77 MG/DL (ref 0.55–1.02)
DIFFERENTIAL METHOD BLD: ABNORMAL
EOSINOPHIL # BLD: 0 K/UL (ref 0–0.4)
EOSINOPHIL NFR BLD: 0 % (ref 0–7)
EPITH CASTS URNS QL MICRO: ABNORMAL /LPF
ERYTHROCYTE [DISTWIDTH] IN BLOOD BY AUTOMATED COUNT: 15 % (ref 11.5–14.5)
GLOBULIN SER CALC-MCNC: 3.9 G/DL (ref 2–4)
GLUCOSE SERPL-MCNC: 108 MG/DL (ref 65–100)
GLUCOSE UR STRIP.AUTO-MCNC: NEGATIVE MG/DL
HCT VFR BLD AUTO: 39.3 % (ref 35–47)
HGB BLD-MCNC: 11.9 G/DL (ref 11.5–16)
HGB UR QL STRIP: NEGATIVE
IMM GRANULOCYTES # BLD AUTO: 0 K/UL (ref 0–0.04)
IMM GRANULOCYTES NFR BLD AUTO: 0 % (ref 0–0.5)
KETONES UR QL STRIP.AUTO: NEGATIVE MG/DL
LACTATE SERPL-SCNC: 1.2 MMOL/L (ref 0.4–2)
LEUKOCYTE ESTERASE UR QL STRIP.AUTO: NEGATIVE
LIPASE SERPL-CCNC: 81 U/L (ref 73–393)
LYMPHOCYTES # BLD: 0.6 K/UL (ref 0.8–3.5)
LYMPHOCYTES NFR BLD: 5 % (ref 12–49)
MCH RBC QN AUTO: 29.2 PG (ref 26–34)
MCHC RBC AUTO-ENTMCNC: 30.3 G/DL (ref 30–36.5)
MCV RBC AUTO: 96.3 FL (ref 80–99)
MONOCYTES # BLD: 0.6 K/UL (ref 0–1)
MONOCYTES NFR BLD: 5 % (ref 5–13)
NEUTS SEG # BLD: 11.7 K/UL (ref 1.8–8)
NEUTS SEG NFR BLD: 90 % (ref 32–75)
NITRITE UR QL STRIP.AUTO: NEGATIVE
NRBC # BLD: 0 K/UL (ref 0–0.01)
NRBC BLD-RTO: 0 PER 100 WBC
PH UR STRIP: 8.5 [PH] (ref 5–8)
PLATELET # BLD AUTO: 311 K/UL (ref 150–400)
PLATELET COMMENTS,PCOM: ABNORMAL
PMV BLD AUTO: 9.8 FL (ref 8.9–12.9)
POTASSIUM SERPL-SCNC: 4.3 MMOL/L (ref 3.5–5.1)
PROT SERPL-MCNC: 7.8 G/DL (ref 6.4–8.2)
PROT UR STRIP-MCNC: NEGATIVE MG/DL
RBC # BLD AUTO: 4.08 M/UL (ref 3.8–5.2)
RBC #/AREA URNS HPF: ABNORMAL /HPF (ref 0–5)
RBC MORPH BLD: ABNORMAL
RBC MORPH BLD: ABNORMAL
SAMPLES BEING HELD,HOLD: NORMAL
SODIUM SERPL-SCNC: 137 MMOL/L (ref 136–145)
SOURCE, COVRS: NORMAL
SP GR UR REFRACTOMETRY: 1 (ref 1–1.03)
UR CULT HOLD, URHOLD: NORMAL
UROBILINOGEN UR QL STRIP.AUTO: 0.2 EU/DL (ref 0.2–1)
WBC # BLD AUTO: 12.9 K/UL (ref 3.6–11)
WBC URNS QL MICRO: ABNORMAL /HPF (ref 0–4)

## 2021-05-25 PROCEDURE — 76705 ECHO EXAM OF ABDOMEN: CPT

## 2021-05-25 PROCEDURE — 83690 ASSAY OF LIPASE: CPT

## 2021-05-25 PROCEDURE — 77030031139 HC SUT VCRL2 J&J -A: Performed by: SURGERY

## 2021-05-25 PROCEDURE — 96374 THER/PROPH/DIAG INJ IV PUSH: CPT

## 2021-05-25 PROCEDURE — 74011250637 HC RX REV CODE- 250/637: Performed by: ANESTHESIOLOGY

## 2021-05-25 PROCEDURE — 77030009956 HC RELD ENDOSTCH COVD -B: Performed by: SURGERY

## 2021-05-25 PROCEDURE — 74011000250 HC RX REV CODE- 250: Performed by: NURSE ANESTHETIST, CERTIFIED REGISTERED

## 2021-05-25 PROCEDURE — 76010000132 HC OR TIME 2.5 TO 3 HR: Performed by: SURGERY

## 2021-05-25 PROCEDURE — 74011250636 HC RX REV CODE- 250/636: Performed by: NURSE ANESTHETIST, CERTIFIED REGISTERED

## 2021-05-25 PROCEDURE — 77030008608 HC TRCR ENDOSC SMTH AMR -B: Performed by: SURGERY

## 2021-05-25 PROCEDURE — 99284 EMERGENCY DEPT VISIT MOD MDM: CPT

## 2021-05-25 PROCEDURE — 36415 COLL VENOUS BLD VENIPUNCTURE: CPT

## 2021-05-25 PROCEDURE — 87635 SARS-COV-2 COVID-19 AMP PRB: CPT

## 2021-05-25 PROCEDURE — 77030008756 HC TU IRR SUC STRY -B: Performed by: SURGERY

## 2021-05-25 PROCEDURE — 81001 URINALYSIS AUTO W/SCOPE: CPT

## 2021-05-25 PROCEDURE — 77030008684 HC TU ET CUF COVD -B: Performed by: ANESTHESIOLOGY

## 2021-05-25 PROCEDURE — 77030020829: Performed by: SURGERY

## 2021-05-25 PROCEDURE — 77030018684: Performed by: SURGERY

## 2021-05-25 PROCEDURE — 77030040830 HC CATH URETH FOL MDII -A: Performed by: SURGERY

## 2021-05-25 PROCEDURE — 83605 ASSAY OF LACTIC ACID: CPT

## 2021-05-25 PROCEDURE — 77030010513 HC APPL CLP LIG J&J -C: Performed by: SURGERY

## 2021-05-25 PROCEDURE — 2709999900 HC NON-CHARGEABLE SUPPLY: Performed by: SURGERY

## 2021-05-25 PROCEDURE — 74011250636 HC RX REV CODE- 250/636: Performed by: STUDENT IN AN ORGANIZED HEALTH CARE EDUCATION/TRAINING PROGRAM

## 2021-05-25 PROCEDURE — 77030020263 HC SOL INJ SOD CL0.9% LFCR 1000ML: Performed by: SURGERY

## 2021-05-25 PROCEDURE — 76060000036 HC ANESTHESIA 2.5 TO 3 HR: Performed by: SURGERY

## 2021-05-25 PROCEDURE — 74176 CT ABD & PELVIS W/O CONTRAST: CPT

## 2021-05-25 PROCEDURE — 74011250636 HC RX REV CODE- 250/636: Performed by: ANESTHESIOLOGY

## 2021-05-25 PROCEDURE — 77030026438 HC STYL ET INTUB CARD -A: Performed by: ANESTHESIOLOGY

## 2021-05-25 PROCEDURE — 85025 COMPLETE CBC W/AUTO DIFF WBC: CPT

## 2021-05-25 PROCEDURE — 77030032230 HC DSCTR ULTSONC CRDLSS COVD -E: Performed by: SURGERY

## 2021-05-25 PROCEDURE — 76210000006 HC OR PH I REC 0.5 TO 1 HR: Performed by: SURGERY

## 2021-05-25 PROCEDURE — 99219 PR INITIAL OBSERVATION CARE/DAY 50 MINUTES: CPT | Performed by: SURGERY

## 2021-05-25 PROCEDURE — 77030008606 HC TRCR ENDOSC KII AMR -B: Performed by: SURGERY

## 2021-05-25 PROCEDURE — 77030040361 HC SLV COMPR DVT MDII -B: Performed by: SURGERY

## 2021-05-25 PROCEDURE — 77030002933 HC SUT MCRYL J&J -A: Performed by: SURGERY

## 2021-05-25 PROCEDURE — 77030009957 HC RELD ENDOSTCH COVD -C: Performed by: SURGERY

## 2021-05-25 PROCEDURE — 80053 COMPREHEN METABOLIC PANEL: CPT

## 2021-05-25 PROCEDURE — 96375 TX/PRO/DX INJ NEW DRUG ADDON: CPT

## 2021-05-25 RX ORDER — LIDOCAINE HYDROCHLORIDE 20 MG/ML
INJECTION, SOLUTION EPIDURAL; INFILTRATION; INTRACAUDAL; PERINEURAL AS NEEDED
Status: DISCONTINUED | OUTPATIENT
Start: 2021-05-25 | End: 2021-05-26 | Stop reason: HOSPADM

## 2021-05-25 RX ORDER — SODIUM CHLORIDE 9 MG/ML
1000 INJECTION, SOLUTION INTRAVENOUS ONCE
Status: COMPLETED | OUTPATIENT
Start: 2021-05-25 | End: 2021-05-25

## 2021-05-25 RX ORDER — SODIUM CHLORIDE, SODIUM LACTATE, POTASSIUM CHLORIDE, CALCIUM CHLORIDE 600; 310; 30; 20 MG/100ML; MG/100ML; MG/100ML; MG/100ML
125 INJECTION, SOLUTION INTRAVENOUS CONTINUOUS
Status: DISCONTINUED | OUTPATIENT
Start: 2021-05-25 | End: 2021-05-26 | Stop reason: HOSPADM

## 2021-05-25 RX ORDER — ACETAMINOPHEN 325 MG/1
650 TABLET ORAL ONCE
Status: COMPLETED | OUTPATIENT
Start: 2021-05-25 | End: 2021-05-25

## 2021-05-25 RX ORDER — HYDROMORPHONE HYDROCHLORIDE 1 MG/ML
0.2 INJECTION, SOLUTION INTRAMUSCULAR; INTRAVENOUS; SUBCUTANEOUS
Status: DISCONTINUED | OUTPATIENT
Start: 2021-05-25 | End: 2021-05-26 | Stop reason: HOSPADM

## 2021-05-25 RX ORDER — ROCURONIUM BROMIDE 10 MG/ML
INJECTION, SOLUTION INTRAVENOUS AS NEEDED
Status: DISCONTINUED | OUTPATIENT
Start: 2021-05-25 | End: 2021-05-26 | Stop reason: HOSPADM

## 2021-05-25 RX ORDER — CEFOXITIN 2 G/1
INJECTION, POWDER, FOR SOLUTION INTRAVENOUS AS NEEDED
Status: DISCONTINUED | OUTPATIENT
Start: 2021-05-25 | End: 2021-05-26 | Stop reason: HOSPADM

## 2021-05-25 RX ORDER — MIDAZOLAM HYDROCHLORIDE 1 MG/ML
1 INJECTION, SOLUTION INTRAMUSCULAR; INTRAVENOUS AS NEEDED
Status: DISCONTINUED | OUTPATIENT
Start: 2021-05-25 | End: 2021-05-26 | Stop reason: HOSPADM

## 2021-05-25 RX ORDER — MORPHINE SULFATE 4 MG/ML
4 INJECTION INTRAVENOUS
Status: COMPLETED | OUTPATIENT
Start: 2021-05-25 | End: 2021-05-25

## 2021-05-25 RX ORDER — ONDANSETRON 2 MG/ML
4 INJECTION INTRAMUSCULAR; INTRAVENOUS AS NEEDED
Status: DISCONTINUED | OUTPATIENT
Start: 2021-05-25 | End: 2021-05-26 | Stop reason: HOSPADM

## 2021-05-25 RX ORDER — SODIUM CHLORIDE 0.9 % (FLUSH) 0.9 %
5-40 SYRINGE (ML) INJECTION AS NEEDED
Status: DISCONTINUED | OUTPATIENT
Start: 2021-05-25 | End: 2021-05-26 | Stop reason: HOSPADM

## 2021-05-25 RX ORDER — OXYCODONE AND ACETAMINOPHEN 5; 325 MG/1; MG/1
1 TABLET ORAL AS NEEDED
Status: DISCONTINUED | OUTPATIENT
Start: 2021-05-25 | End: 2021-05-26 | Stop reason: HOSPADM

## 2021-05-25 RX ORDER — FENTANYL CITRATE 50 UG/ML
INJECTION, SOLUTION INTRAMUSCULAR; INTRAVENOUS AS NEEDED
Status: DISCONTINUED | OUTPATIENT
Start: 2021-05-25 | End: 2021-05-26 | Stop reason: HOSPADM

## 2021-05-25 RX ORDER — ONDANSETRON 2 MG/ML
INJECTION INTRAMUSCULAR; INTRAVENOUS AS NEEDED
Status: DISCONTINUED | OUTPATIENT
Start: 2021-05-25 | End: 2021-05-26 | Stop reason: HOSPADM

## 2021-05-25 RX ORDER — SODIUM CHLORIDE 9 MG/ML
1000 INJECTION, SOLUTION INTRAVENOUS CONTINUOUS
Status: DISCONTINUED | OUTPATIENT
Start: 2021-05-25 | End: 2021-05-26 | Stop reason: HOSPADM

## 2021-05-25 RX ORDER — DIPHENHYDRAMINE HYDROCHLORIDE 50 MG/ML
12.5 INJECTION, SOLUTION INTRAMUSCULAR; INTRAVENOUS AS NEEDED
Status: ACTIVE | OUTPATIENT
Start: 2021-05-25 | End: 2021-05-25

## 2021-05-25 RX ORDER — DEXAMETHASONE SODIUM PHOSPHATE 4 MG/ML
INJECTION, SOLUTION INTRA-ARTICULAR; INTRALESIONAL; INTRAMUSCULAR; INTRAVENOUS; SOFT TISSUE AS NEEDED
Status: DISCONTINUED | OUTPATIENT
Start: 2021-05-25 | End: 2021-05-26 | Stop reason: HOSPADM

## 2021-05-25 RX ORDER — SODIUM CHLORIDE 0.9 % (FLUSH) 0.9 %
5-40 SYRINGE (ML) INJECTION EVERY 8 HOURS
Status: DISCONTINUED | OUTPATIENT
Start: 2021-05-25 | End: 2021-05-26 | Stop reason: HOSPADM

## 2021-05-25 RX ORDER — EPHEDRINE SULFATE/0.9% NACL/PF 50 MG/5 ML
5 SYRINGE (ML) INTRAVENOUS AS NEEDED
Status: DISCONTINUED | OUTPATIENT
Start: 2021-05-25 | End: 2021-05-26 | Stop reason: HOSPADM

## 2021-05-25 RX ORDER — MIDAZOLAM HYDROCHLORIDE 1 MG/ML
0.5 INJECTION, SOLUTION INTRAMUSCULAR; INTRAVENOUS
Status: DISCONTINUED | OUTPATIENT
Start: 2021-05-25 | End: 2021-05-26 | Stop reason: HOSPADM

## 2021-05-25 RX ORDER — FENTANYL CITRATE 50 UG/ML
25 INJECTION, SOLUTION INTRAMUSCULAR; INTRAVENOUS
Status: DISCONTINUED | OUTPATIENT
Start: 2021-05-25 | End: 2021-05-26 | Stop reason: HOSPADM

## 2021-05-25 RX ORDER — MORPHINE SULFATE 2 MG/ML
2 INJECTION, SOLUTION INTRAMUSCULAR; INTRAVENOUS
Status: DISCONTINUED | OUTPATIENT
Start: 2021-05-25 | End: 2021-05-26 | Stop reason: HOSPADM

## 2021-05-25 RX ORDER — ONDANSETRON 2 MG/ML
4 INJECTION INTRAMUSCULAR; INTRAVENOUS
Status: COMPLETED | OUTPATIENT
Start: 2021-05-25 | End: 2021-05-25

## 2021-05-25 RX ORDER — PROPOFOL 10 MG/ML
INJECTION, EMULSION INTRAVENOUS AS NEEDED
Status: DISCONTINUED | OUTPATIENT
Start: 2021-05-25 | End: 2021-05-26 | Stop reason: HOSPADM

## 2021-05-25 RX ORDER — FENTANYL CITRATE 50 UG/ML
50 INJECTION, SOLUTION INTRAMUSCULAR; INTRAVENOUS AS NEEDED
Status: DISCONTINUED | OUTPATIENT
Start: 2021-05-25 | End: 2021-05-26 | Stop reason: HOSPADM

## 2021-05-25 RX ORDER — SUCCINYLCHOLINE CHLORIDE 20 MG/ML
INJECTION INTRAMUSCULAR; INTRAVENOUS AS NEEDED
Status: DISCONTINUED | OUTPATIENT
Start: 2021-05-25 | End: 2021-05-26 | Stop reason: HOSPADM

## 2021-05-25 RX ORDER — EPHEDRINE SULFATE/0.9% NACL/PF 50 MG/5 ML
SYRINGE (ML) INTRAVENOUS AS NEEDED
Status: DISCONTINUED | OUTPATIENT
Start: 2021-05-25 | End: 2021-05-26 | Stop reason: HOSPADM

## 2021-05-25 RX ORDER — LIDOCAINE HYDROCHLORIDE 10 MG/ML
0.1 INJECTION, SOLUTION EPIDURAL; INFILTRATION; INTRACAUDAL; PERINEURAL AS NEEDED
Status: DISCONTINUED | OUTPATIENT
Start: 2021-05-25 | End: 2021-05-26 | Stop reason: HOSPADM

## 2021-05-25 RX ORDER — MIDAZOLAM HYDROCHLORIDE 1 MG/ML
INJECTION, SOLUTION INTRAMUSCULAR; INTRAVENOUS AS NEEDED
Status: DISCONTINUED | OUTPATIENT
Start: 2021-05-25 | End: 2021-05-26 | Stop reason: HOSPADM

## 2021-05-25 RX ORDER — SODIUM CHLORIDE 9 MG/ML
50 INJECTION, SOLUTION INTRAVENOUS CONTINUOUS
Status: DISCONTINUED | OUTPATIENT
Start: 2021-05-25 | End: 2021-05-26 | Stop reason: HOSPADM

## 2021-05-25 RX ADMIN — CEFOXITIN SODIUM 2 G: 2 POWDER, FOR SOLUTION INTRAVENOUS at 22:45

## 2021-05-25 RX ADMIN — Medication 10 MG: at 22:42

## 2021-05-25 RX ADMIN — MORPHINE SULFATE 4 MG: 4 INJECTION, SOLUTION INTRAMUSCULAR; INTRAVENOUS at 17:42

## 2021-05-25 RX ADMIN — SODIUM CHLORIDE 1000 ML: 9 INJECTION, SOLUTION INTRAVENOUS at 17:40

## 2021-05-25 RX ADMIN — SUCCINYLCHOLINE CHLORIDE 100 MG: 20 INJECTION, SOLUTION INTRAMUSCULAR; INTRAVENOUS at 22:28

## 2021-05-25 RX ADMIN — ROCURONIUM BROMIDE 5 MG: 10 SOLUTION INTRAVENOUS at 22:27

## 2021-05-25 RX ADMIN — HYDROMORPHONE HYDROCHLORIDE 1 MG: 2 INJECTION, SOLUTION INTRAMUSCULAR; INTRAVENOUS; SUBCUTANEOUS at 23:15

## 2021-05-25 RX ADMIN — ROCURONIUM BROMIDE 25 MG: 10 SOLUTION INTRAVENOUS at 22:33

## 2021-05-25 RX ADMIN — ONDANSETRON 4 MG: 2 INJECTION INTRAMUSCULAR; INTRAVENOUS at 17:41

## 2021-05-25 RX ADMIN — MORPHINE SULFATE 4 MG: 4 INJECTION, SOLUTION INTRAMUSCULAR; INTRAVENOUS at 21:41

## 2021-05-25 RX ADMIN — LIDOCAINE HYDROCHLORIDE 60 MG: 20 INJECTION, SOLUTION EPIDURAL; INFILTRATION; INTRACAUDAL; PERINEURAL at 22:27

## 2021-05-25 RX ADMIN — MIDAZOLAM 2 MG: 1 INJECTION INTRAMUSCULAR; INTRAVENOUS at 22:21

## 2021-05-25 RX ADMIN — PROPOFOL 150 MG: 10 INJECTION, EMULSION INTRAVENOUS at 22:27

## 2021-05-25 RX ADMIN — DEXAMETHASONE SODIUM PHOSPHATE 4 MG: 4 INJECTION, SOLUTION INTRAMUSCULAR; INTRAVENOUS at 22:35

## 2021-05-25 RX ADMIN — ONDANSETRON HYDROCHLORIDE 4 MG: 2 INJECTION, SOLUTION INTRAMUSCULAR; INTRAVENOUS at 22:35

## 2021-05-25 RX ADMIN — ACETAMINOPHEN 650 MG: 325 TABLET ORAL at 22:18

## 2021-05-25 RX ADMIN — FENTANYL CITRATE 75 MCG: 50 INJECTION, SOLUTION INTRAMUSCULAR; INTRAVENOUS at 22:27

## 2021-05-25 RX ADMIN — SODIUM CHLORIDE, POTASSIUM CHLORIDE, SODIUM LACTATE AND CALCIUM CHLORIDE 125 ML/HR: 600; 310; 30; 20 INJECTION, SOLUTION INTRAVENOUS at 22:16

## 2021-05-25 NOTE — ED TRIAGE NOTES
Pt c/o generalized abd pain since last week, denies fever or chills, +n/v, some constipation, +urinary frequency

## 2021-05-26 PROBLEM — K45.8 INTERNAL HERNIA: Status: ACTIVE | Noted: 2021-05-26

## 2021-05-26 PROCEDURE — 99218 HC RM OBSERVATION: CPT

## 2021-05-26 PROCEDURE — 74011000250 HC RX REV CODE- 250: Performed by: SURGERY

## 2021-05-26 PROCEDURE — 74011250637 HC RX REV CODE- 250/637: Performed by: SURGERY

## 2021-05-26 PROCEDURE — 99024 POSTOP FOLLOW-UP VISIT: CPT | Performed by: NURSE PRACTITIONER

## 2021-05-26 PROCEDURE — 44238 UNLISTED LAPS PX INTESTINE: CPT | Performed by: SURGERY

## 2021-05-26 PROCEDURE — 74011000250 HC RX REV CODE- 250: Performed by: NURSE ANESTHETIST, CERTIFIED REGISTERED

## 2021-05-26 PROCEDURE — 74011250636 HC RX REV CODE- 250/636: Performed by: NURSE ANESTHETIST, CERTIFIED REGISTERED

## 2021-05-26 PROCEDURE — 74011250636 HC RX REV CODE- 250/636: Performed by: SURGERY

## 2021-05-26 RX ORDER — FLUTICASONE PROPIONATE 50 MCG
2 SPRAY, SUSPENSION (ML) NASAL DAILY
Status: DISCONTINUED | OUTPATIENT
Start: 2021-05-26 | End: 2021-05-26

## 2021-05-26 RX ORDER — SODIUM CHLORIDE 0.9 % (FLUSH) 0.9 %
5-40 SYRINGE (ML) INJECTION EVERY 8 HOURS
Status: DISCONTINUED | OUTPATIENT
Start: 2021-05-26 | End: 2021-05-27 | Stop reason: HOSPADM

## 2021-05-26 RX ORDER — SODIUM CHLORIDE 0.9 % (FLUSH) 0.9 %
5-40 SYRINGE (ML) INJECTION AS NEEDED
Status: DISCONTINUED | OUTPATIENT
Start: 2021-05-26 | End: 2021-05-27 | Stop reason: HOSPADM

## 2021-05-26 RX ORDER — HYDROMORPHONE HYDROCHLORIDE 1 MG/ML
.5-1 INJECTION, SOLUTION INTRAMUSCULAR; INTRAVENOUS; SUBCUTANEOUS
Status: DISCONTINUED | OUTPATIENT
Start: 2021-05-26 | End: 2021-05-27 | Stop reason: HOSPADM

## 2021-05-26 RX ORDER — NALOXONE HYDROCHLORIDE 0.4 MG/ML
0.4 INJECTION, SOLUTION INTRAMUSCULAR; INTRAVENOUS; SUBCUTANEOUS AS NEEDED
Status: DISCONTINUED | OUTPATIENT
Start: 2021-05-26 | End: 2021-05-27 | Stop reason: HOSPADM

## 2021-05-26 RX ORDER — ENOXAPARIN SODIUM 100 MG/ML
40 INJECTION SUBCUTANEOUS EVERY 24 HOURS
Status: DISCONTINUED | OUTPATIENT
Start: 2021-05-26 | End: 2021-05-27 | Stop reason: HOSPADM

## 2021-05-26 RX ORDER — HYDROMORPHONE HYDROCHLORIDE 2 MG/ML
INJECTION, SOLUTION INTRAMUSCULAR; INTRAVENOUS; SUBCUTANEOUS AS NEEDED
Status: DISCONTINUED | OUTPATIENT
Start: 2021-05-25 | End: 2021-05-26 | Stop reason: HOSPADM

## 2021-05-26 RX ORDER — KETOROLAC TROMETHAMINE 30 MG/ML
30 INJECTION, SOLUTION INTRAMUSCULAR; INTRAVENOUS EVERY 6 HOURS
Status: DISCONTINUED | OUTPATIENT
Start: 2021-05-26 | End: 2021-05-27 | Stop reason: HOSPADM

## 2021-05-26 RX ORDER — SODIUM CHLORIDE, SODIUM LACTATE, POTASSIUM CHLORIDE, CALCIUM CHLORIDE 600; 310; 30; 20 MG/100ML; MG/100ML; MG/100ML; MG/100ML
125 INJECTION, SOLUTION INTRAVENOUS CONTINUOUS
Status: DISCONTINUED | OUTPATIENT
Start: 2021-05-26 | End: 2021-05-27 | Stop reason: HOSPADM

## 2021-05-26 RX ORDER — DIPHENHYDRAMINE HYDROCHLORIDE 50 MG/ML
12.5 INJECTION, SOLUTION INTRAMUSCULAR; INTRAVENOUS
Status: DISCONTINUED | OUTPATIENT
Start: 2021-05-26 | End: 2021-05-27 | Stop reason: HOSPADM

## 2021-05-26 RX ORDER — FOLIC ACID 1 MG/1
1 TABLET ORAL DAILY
Status: DISCONTINUED | OUTPATIENT
Start: 2021-05-26 | End: 2021-05-27 | Stop reason: HOSPADM

## 2021-05-26 RX ORDER — ACETAMINOPHEN 325 MG/1
650 TABLET ORAL
Status: DISCONTINUED | OUTPATIENT
Start: 2021-05-26 | End: 2021-05-27 | Stop reason: HOSPADM

## 2021-05-26 RX ORDER — CETIRIZINE HCL 10 MG
10 TABLET ORAL DAILY
Status: DISCONTINUED | OUTPATIENT
Start: 2021-05-26 | End: 2021-05-27 | Stop reason: HOSPADM

## 2021-05-26 RX ORDER — BUPIVACAINE HYDROCHLORIDE AND EPINEPHRINE 2.5; 5 MG/ML; UG/ML
INJECTION, SOLUTION EPIDURAL; INFILTRATION; INTRACAUDAL; PERINEURAL AS NEEDED
Status: DISCONTINUED | OUTPATIENT
Start: 2021-05-26 | End: 2021-05-26 | Stop reason: HOSPADM

## 2021-05-26 RX ORDER — KETOROLAC TROMETHAMINE 30 MG/ML
INJECTION, SOLUTION INTRAMUSCULAR; INTRAVENOUS AS NEEDED
Status: DISCONTINUED | OUTPATIENT
Start: 2021-05-26 | End: 2021-05-26 | Stop reason: HOSPADM

## 2021-05-26 RX ORDER — ONDANSETRON 2 MG/ML
4 INJECTION INTRAMUSCULAR; INTRAVENOUS
Status: DISCONTINUED | OUTPATIENT
Start: 2021-05-26 | End: 2021-05-27 | Stop reason: HOSPADM

## 2021-05-26 RX ORDER — OXYCODONE AND ACETAMINOPHEN 5; 325 MG/1; MG/1
1-2 TABLET ORAL
Status: DISCONTINUED | OUTPATIENT
Start: 2021-05-26 | End: 2021-05-27 | Stop reason: HOSPADM

## 2021-05-26 RX ADMIN — KETOROLAC TROMETHAMINE 30 MG: 30 INJECTION, SOLUTION INTRAMUSCULAR; INTRAVENOUS at 05:30

## 2021-05-26 RX ADMIN — KETOROLAC TROMETHAMINE 30 MG: 30 INJECTION, SOLUTION INTRAMUSCULAR; INTRAVENOUS at 01:00

## 2021-05-26 RX ADMIN — CETIRIZINE HYDROCHLORIDE 10 MG: 10 TABLET, FILM COATED ORAL at 08:26

## 2021-05-26 RX ADMIN — WATER 2 G: 1 INJECTION INTRAMUSCULAR; INTRAVENOUS; SUBCUTANEOUS at 08:27

## 2021-05-26 RX ADMIN — KETOROLAC TROMETHAMINE 30 MG: 30 INJECTION, SOLUTION INTRAMUSCULAR; INTRAVENOUS at 02:21

## 2021-05-26 RX ADMIN — ACETAMINOPHEN 650 MG: 325 TABLET ORAL at 11:13

## 2021-05-26 RX ADMIN — SODIUM CHLORIDE, POTASSIUM CHLORIDE, SODIUM LACTATE AND CALCIUM CHLORIDE 125 ML/HR: 600; 310; 30; 20 INJECTION, SOLUTION INTRAVENOUS at 02:00

## 2021-05-26 RX ADMIN — Medication 10 ML: at 21:42

## 2021-05-26 RX ADMIN — FOLIC ACID 1 MG: 1 TABLET ORAL at 08:26

## 2021-05-26 RX ADMIN — KETOROLAC TROMETHAMINE 30 MG: 30 INJECTION, SOLUTION INTRAMUSCULAR; INTRAVENOUS at 11:15

## 2021-05-26 RX ADMIN — SUGAMMADEX 150 MG: 100 INJECTION, SOLUTION INTRAVENOUS at 00:47

## 2021-05-26 RX ADMIN — KETOROLAC TROMETHAMINE 30 MG: 30 INJECTION, SOLUTION INTRAMUSCULAR; INTRAVENOUS at 17:11

## 2021-05-26 RX ADMIN — WATER 2 G: 1 INJECTION INTRAMUSCULAR; INTRAVENOUS; SUBCUTANEOUS at 02:20

## 2021-05-26 RX ADMIN — ONDANSETRON 4 MG: 2 INJECTION INTRAMUSCULAR; INTRAVENOUS at 02:21

## 2021-05-26 RX ADMIN — SODIUM CHLORIDE, POTASSIUM CHLORIDE, SODIUM LACTATE AND CALCIUM CHLORIDE 125 ML/HR: 600; 310; 30; 20 INJECTION, SOLUTION INTRAVENOUS at 21:40

## 2021-05-26 RX ADMIN — WATER 2 G: 1 INJECTION INTRAMUSCULAR; INTRAVENOUS; SUBCUTANEOUS at 17:10

## 2021-05-26 NOTE — PROGRESS NOTES
Bedside shift change report given to Rasheeda Bustillo RN (oncoming nurse) by Bettye Neely (offgoing nurse). Report included the following information SBAR, Kardex, Procedure Summary, Intake/Output, MAR and Recent Results.

## 2021-05-26 NOTE — ED PROVIDER NOTES
The patient is a 80-year-old female presenting today secondary to abdominal pain with nausea and vomiting. Reports that she started with pain 3 to 4 days ago. Initially started in her upper abdomen but is since progressed to include her entire abdomen. Pain is severe and worse with any movement. Associated nausea and vomiting. Last bowel movement was yesterday. No fevers. No chest pain or shortness of breath. She had laparoscopic tubal ligation many years ago but denies any other history of abdominal surgeries. Denies urinary symptoms. Past Medical History:   Diagnosis Date    Anemia     Reactive airway disease     Rheumatoid arthritis ([facility] 10/24/2017       Past Surgical History:   Procedure Laterality Date    COLONOSCOPY N/A 1/15/2018    COLONOSCOPY performed by Ab Spears.  Tino Reno MD at [facility] ENDOSCOPY    HX OTHER SURGICAL      pins placed in left wrist          Family History:   Problem Relation Age of Onset    Thyroid Disease Mother     High Cholesterol Mother     Heart Disease Father        Social History     Socioeconomic History    Marital status:      Spouse name: Not on file    Number of children: Not on file    Years of education: Not on file    Highest education level: Not on file   Occupational History    Not on file   Tobacco Use    Smoking status: Former Smoker     Quit date:      Years since quittin.4    Smokeless tobacco: Never Used   Vaping Use    Vaping Use: Never used   Substance and Sexual Activity    Alcohol use: No    Drug use: No    Sexual activity: Never   Other Topics Concern     Service Not Asked    Blood Transfusions Not Asked    Caffeine Concern Not Asked    Occupational Exposure Not Asked    Hobby Hazards Not Asked    Sleep Concern Not Asked    Stress Concern Not Asked    Weight Concern Not Asked    Special Diet Not Asked    Back Care Not Asked    Exercise Not Asked    Bike Helmet Not Asked    [address] Not Asked [facility] Self-Exams Not Asked   Social History Narrative    Not on file     Social Determinants of Health     Financial Resource Strain:     Difficulty of Paying Living Expenses:    Food Insecurity:     Worried About Running Out of Food in the Last Year:     [address] in the Last Year:    Transportation Needs:     Lack of Transportation (Medical):  Lack of Transportation (Non-Medical):    Physical Activity:     Days of Exercise per Week:     Minutes of Exercise per Session:    Stress:     Feeling of Stress :    Social Connections:     Frequency of Communication with Friends and Family:     Frequency of Social Gatherings with Friends and Family:     Attends Christianity Services:     Active Member of Clubs or Organizations:     Attends Club or Organization Meetings:     Marital Status:    Intimate Partner Violence:     Fear of Current or Ex-Partner:     Emotionally Abused:     Physically Abused:     Sexually Abused: ALLERGIES: Patient has no known allergies. Review of Systems   Constitutional: Negative for chills and fever. HENT: Negative for congestion and rhinorrhea. Eyes: Negative for redness and visual disturbance. Respiratory: Negative for cough and shortness of breath. Cardiovascular: Negative for chest pain and leg swelling. Gastrointestinal: Positive for abdominal pain, nausea and vomiting. Negative for diarrhea. Genitourinary: Negative for dysuria, flank pain, frequency, hematuria and urgency. Musculoskeletal: Negative for arthralgias, back pain, myalgias and neck pain. Skin: Negative for rash and wound. Allergic/Immunologic: Negative for immunocompromised state. Neurological: Negative for dizziness and headaches. Vitals:    05/25/21 1601   BP: (!) 176/92   Pulse: 66   Resp: 16   Temp: 97.8 °F (36.6 °C)   SpO2: 96%            Physical Exam  Vitals and nursing note reviewed. Constitutional:       General: She is not in acute distress. Appearance: She is well-developed. She is not diaphoretic. HENT:      Head: Normocephalic. Mouth/Throat:      Pharynx: No oropharyngeal exudate. Eyes:      General:         Right eye: No discharge. Left eye: No discharge. Pupils: Pupils are equal, round, and reactive to light. Cardiovascular:      Rate and Rhythm: Normal rate and regular rhythm. Heart sounds: Normal heart sounds. No murmur heard. No friction rub. No gallop. Pulmonary:      Effort: Pulmonary effort is normal. No respiratory distress. Breath sounds: Normal breath sounds. No stridor. No wheezing or rales. Abdominal:      General: Bowel sounds are normal. There is no distension. Palpations: Abdomen is soft. Tenderness: There is abdominal tenderness in the right upper quadrant, epigastric area and left upper quadrant. There is guarding. There is no rebound. Musculoskeletal:         General: No deformity. Normal range of motion. Cervical back: Normal range of motion and neck supple. Skin:     General: Skin is warm and dry. Capillary Refill: Capillary refill takes less than 2 seconds. Findings: No rash. Neurological:      Mental Status: She is alert and oriented to person, place, and time. Psychiatric:         Behavior: Behavior normal.         Labs Reviewed:   Leukocytosis of 12.9 with normal hemoglobin  Renal function electrolytes acceptable  LFTs normal  Lipase not elevated      Imaging Reviewed:   Right upper quadrant ultrasound shows hepatic steatosis without acute process    CT of the abdomen shows findings consistent with internal hernia versus enteritis    Course:  Morphine IV given with significant provement pain     IV fluids given    Discussed with general surgery, Dr. Melisa Keller, who reviewed the images and agrees that there is internal hernia. Will take to the operating room tonight. MDM:  61 y.o. female IV morphine given for pain. Vital signs stable.   Here with severe abdominal pain, found to have internal hernia who will require emergency surgery this evening. Patient admitted to surgical service. Clinical Impression:     ICD-10-CM ICD-9-CM    1. Internal hernia  K45.8 553.8            Disposition: Admit    Total critical care time spent exclusive of procedures:  55  Due to a high probability of clinically significant, life threatening deterioration, the patient required my highest level of preparedness to intervene emergently and I personally spent this critical care time directly and personally managing the patient. This critical care time included obtaining a history; examining the patient; pulse oximetry; ordering and review of studies; arranging urgent treatment with development of a management plan; evaluation of patient's response to treatment; frequent reassessment; and, discussions with other providers. This critical care time was performed to assess and manage the high probability of imminent, life-threatening deterioration that could result in multi-organ failure. It was exclusive of separately billable procedures and treating other patients and teaching time.       Cory Webb, DO

## 2021-05-26 NOTE — DISCHARGE INSTRUCTIONS
Patient Education   No heavy lifting more than 10-15 lbs  May remove outer dressings in 3 days  Leave the steri-strips in place  May shower in 2 days  No tub baths or swimming. May walk and climb stairs. Laparoscopy: What to Expect at [address] [place] [address]  After laparoscopic surgery, you are likely to have pain for the next several days. You may have a low fever and feel tired and sick to your stomach. This is common. You should feel better after 1 to 2 weeks. This care sheet gives you a general idea about how long it will take for you to recover. But each person recovers at a different pace. Follow the steps below to get better as quickly as possible. How can you care for yourself at home? Activity    · Rest when you feel tired. Getting enough sleep will help you recover.     · Try to walk each day. Start by walking a little more than you did the day before. Bit by bit, increase the amount you walk. Walking boosts blood flow and helps prevent pneumonia and constipation.     · Avoid strenuous activities, such as bicycle riding, jogging, weight lifting, or aerobic exercise, until your doctor says it is okay.     · Avoid lifting anything that would make you strain. This may include a child, heavy grocery bags and milk containers, a heavy briefcase or backpack, cat litter or dog food bags, or a vacuum .     · You may also have pain in your shoulder. The pain usually lasts about 1 or 2 days.     · You may drive when you are no longer taking pain medicine and can quickly move your foot from the gas pedal to the brake. You must also be able to sit comfortably for a long period of time, even if you do not plan to go far. You might get caught in traffic.     · You will probably need to take 2 weeks off from work. It depends on the type of work you do and how you feel.     · You may shower 24 to 48 hours after surgery, if your doctor okays it. Pat the cut (incision) dry.  Do not take a bath for the first 2 weeks, or until your doctor tells you it is okay. Diet    · If your stomach is upset, try bland, low-fat foods such as plain rice, broiled chicken, toast, and yogurt.     · Drink plenty of fluids to prevent dehydration. Choose water and other caffeine-free clear liquids. If you have kidney, heart, or liver disease and have to limit fluids, talk with your doctor before you increase the amount of fluids you drink.     · You may notice that your bowel movements are not regular right after your surgery. This is common. Avoid constipation and straining with bowel movements. You may want to take a fiber supplement every day. If you have not had a bowel movement after a couple of days, ask your doctor about taking a mild laxative. Medicines    · Your doctor will tell you if and when you can restart your medicines. You will also get instructions about taking any new medicines.     · If you take aspirin or some other blood thinner, ask your doctor if and when to start taking it again. Make sure that you understand exactly what your doctor wants you to do.     · Take pain medicines exactly as directed. ? If the doctor gave you a prescription medicine for pain, take it as prescribed. ? If you are not taking a prescription pain medicine, ask your doctor if you can take an over-the-counter medicine.     · If your doctor prescribed antibiotics, take them as directed. Do not stop taking them just because you feel better. You need to take the full course of antibiotics.     · If you think your pain medicine is making you sick to your stomach:  ? Take your medicine after meals (unless your doctor has told you not to). ? Ask your doctor for a different pain medicine. Incision care    · If you have strips of tape on the incision, leave the tape on for a week or until it falls off.     · Wash the area daily with warm, soapy water and pat it dry. Don't use hydrogen peroxide or alcohol, which can slow healing.  You may cover the area with a gauze bandage if it weeps or rubs against clothing. Change the bandage every day. Follow-up care is a key part of your treatment and safety. Be sure to make and go to all appointments, and call your doctor if you are having problems. It's also a good idea to know your test results and keep a list of the medicines you take. When should you call for help? Call 911 anytime you think you may need emergency care. For example, call if:    · You passed out (lost consciousness).     · You are short of breath. Call your doctor now or seek immediate medical care if:    · You have pain that does not get better after you take pain medicine.     · You have loose stitches, or your incision comes open.     · Bright red blood has soaked through your bandage.     · You have signs of infection, such as:  ? Increased pain, swelling, warmth, or redness. ? Red streaks leading from the incision. ? Pus draining from the incision. ? A fever.     · You are sick to your stomach or cannot keep fluids down.     · You have signs of a blood clot in your leg (called a deep vein thrombosis), such as:  ? Pain in your calf, back of the knee, thigh, or groin. ? Redness and swelling in your leg or groin.     · You cannot pass stools or gas. Watch closely for any changes in your health, and be sure to contact your doctor if you have any problems. Where can you learn more? Go to http://www.gray.com/  Enter G657 in the search box to learn more about \"Laparoscopy: What to Expect at Home. \"  Current as of: April 15, 2020               Content Version: 12.8  © 9452-4411 [employer]. Care instructions adapted under license by [employer] (which disclaims liability or warranty for this information).  If you have questions about a medical condition or this instruction, always ask your healthcare professional. [address] any warranty or liability for your use of this information.

## 2021-05-26 NOTE — PROGRESS NOTES
Primary Nurse Tavo Aguila and Julia Albert, RN performed a dual skin assessment on this patient No impairment noted  Jairon score is 20    4 abdominal lap sites

## 2021-05-26 NOTE — PROGRESS NOTES
General Surgery Daily Progress Note    Admit Date: 2021  Post-Operative Day: 1 Day Post-Op from Procedure(s):  INTERNAL HERNIA REPAIR LAPAROSCOPIC     Subjective:     Last 24 hrs: Pt is feeling much better since having surgery; little post op pain; tolerating liquids ok though not taking in a whole lot. OOB to BR     Objective:     Blood pressure 118/71, pulse 62, temperature 97.7 °F (36.5 °C), resp. rate 17, SpO2 95 %. Temp (24hrs), Av.6 °F (36.4 °C), Min:97.2 °F (36.2 °C), Max:98.3 °F (36.8 °C)      _____________________  Physical Exam:     Alert and Oriented, x3, in no acute distress.   Cardiovascular: RRR, no peripheral edema  Abdomen: soft, lap sites w/ drsgs, 2 w/ some ss drainage; BS somewhat hypoactive      Assessment:   Active Problems:    Internal hernia (2021)            Plan:     Would cont clear liquids for now, maybe adv to fulls this evening  OOB  Pain mgmt  dvt proph  Cbc in the am    Data Review:    Recent Labs     21  1629   WBC 12.9*   HGB 11.9   HCT 39.3        Recent Labs     21  1629      K 4.3      CO2 28   *   BUN 13   CREA 0.77   CA 9.7   ALB 3.9   ALT 21     Recent Labs     21  1629   LPSE 81           ______________________  Medications:    Current Facility-Administered Medications   Medication Dose Route Frequency    sodium chloride (NS) flush 5-40 mL  5-40 mL IntraVENous Q8H    sodium chloride (NS) flush 5-40 mL  5-40 mL IntraVENous PRN    acetaminophen (TYLENOL) tablet 650 mg  650 mg Oral Q6H PRN    oxyCODONE-acetaminophen (PERCOCET) 5-325 mg per tablet 1-2 Tablet  1-2 Tablet Oral Q6H PRN    HYDROmorphone (PF) (DILAUDID) injection 0.5-1 mg  0.5-1 mg IntraVENous Q3H PRN    naloxone (NARCAN) injection 0.4 mg  0.4 mg IntraVENous PRN    ondansetron (ZOFRAN) injection 4 mg  4 mg IntraVENous Q4H PRN    diphenhydrAMINE (BENADRYL) injection 12.5 mg  12.5 mg IntraVENous Q6H PRN    enoxaparin (LOVENOX) injection 40 mg  40 mg SubCUTAneous Q24H    ketorolac (TORADOL) injection 30 mg  30 mg IntraVENous Q6H    ceFAZolin (ANCEF) 2 g in sterile water (preservative free) 20 mL IV syringe  2 g IntraVENous Q8H    cetirizine (ZYRTEC) tablet 10 mg  10 mg Oral DAILY    folic acid (FOLVITE) tablet 1 mg  1 mg Oral DAILY    . PHARMACY TO SUBSTITUTE PER PROTOCOL (Reordered from: tofacitinib (Xeljanz XR) 11 mg Tb24)    Per Protocol    lactated Ringers infusion  125 mL/hr IntraVENous CONTINUOUS       Estela Godwin NP  5/26/2021      Patient seen and examined  Feeling much better than preop mild soreness from the incisions  Pictures from the surgery were reviewed  General alert in no acute distress  Abdomen soft appropriately tender  Overall doing well  Advance diet as tolerated hopefully home tomorrow

## 2021-05-26 NOTE — ROUTINE PROCESS
TRANSFER - IN REPORT: 
 
Verbal report received from gregg RN(name) on [facility]  being received from ED(unit) for ordered procedure Report consisted of patients Situation, Background, Assessment and  
Recommendations(SBAR). Information from the following report(s) SBAR, Kardex, ED Summary, Procedure Summary, Intake/Output, MAR and Recent Results was reviewed with the receiving nurse. Opportunity for questions and clarification was provided. Assessment completed upon patients arrival to unit and care assumed.

## 2021-05-26 NOTE — OP NOTES
Operative Note    Patient: Padma Garza MRN: [id number]  SSN: [id number]    YOB: 1960  Age: 61 y.o. Sex: female      Date of Surgery: 5/25/2021     Preoperative Diagnosis: INTERNAL HERNIA     Postoperative Diagnosis: INTERNAL HERNIA     Surgeon(s) and Role:     Clare Schmitz MD - Primary    Surgical Assistant: Alicia    Anesthesia: General     Procedure: Procedure(s):  INTERNAL HERNIA REPAIR LAPAROSCOPIC    Indications: The patient is a 25-year-old female who presented to hospital complaining of severe abdominal pain CT scan revealed an internal hernia she comes to the operating room today for emergent repair  Discussed the risk of surgery including infection, hematoma, bleeding,  or bowel injury, recurrence or persistence of symptoms o, and the risks of general anesthetic including Myocardial Infarction, Cerebrovascular Accident, sudden death, or even reaction to anesthetic medications. The patient understands the risk that the procedure could be an open procedure. The patient understands the risks, any and all questions were answered to the patient's satisfaction, and they freely signed the consent for operation. Procedure in Detail: The patient was brought to the operating room placed in the operating table in the supine position general endotracheal anesthesia was then established. The patient was then prepped draped in usual sterile fashion. A 5 mm supraumbilical incision was then made. Veress needle was placed in the abdomen and saline drop test was performed. Once were assured within the abdomen was insufflated to 15 mmHg. The Veress needle was removed and a 5 mm Optiview trocar was then placed. Eventually to left-sided 1 right-sided 5 mm trochars were then placed. Left upper quadrant trocar was eventually upsized to a 12 trocar we turned our attention to the area of the mesocolon. This is the area on the CT scan where the internal hernia seem to be.   The transverse colon was elevated and bowel then reduced from lateral to the ligament of Treitz. There were some adhesions as well holding one small piece of bowel up this was taken down. What we identified at this point was a large defect to the left of the ligament of Treitz at the at the duodenal jejunal junction. This was the spot of the internal hernia. The bowel did not look obstructed or ischemic. We first attempted to suture shut the defect from mesentery to mesentery using interrupted 3-0 Vicryl and then 2-0 silk. However this did not hold. The sutures had to be removed. We then tried to close it using a 3-0 Surgidac Endo Stitch. This was attempted in a running fashion however this also did not hold. That suture was removed. We then were able to finally close the defect using a 3-0 Surgidac Endo Stitch in an interrupted figure of 8 fashion we placed multiples of these until the defect was finally closed. There is no evidence of any bowel injury from this. The abdomen was irrigated. The 12 mm trocar was then removed and the fascia was closed using 0 Vicryl suture and the Endo fascial closure device. The abdomen was desufflated the trochars were removed local anesthetic was injected, the skin was closed using 4-0 Monocryl subcuticular fashion. Mastisol Steri-Strips 2 x 2's Tegaderms were applied patient was awoken the OR taken to PACU in stable condition      Estimated Blood Loss:  25    Tourniquet Time: * No tourniquets in log *      Implants: * No implants in log *            Specimens: * No specimens in log *        Drains: None                Complications: None    Counts: Sponge and needle counts were correct times two.

## 2021-05-26 NOTE — ANESTHESIA POSTPROCEDURE EVALUATION
Procedure(s):  INTERNAL HERNIA REPAIR LAPAROSCOPIC. general    Anesthesia Post Evaluation      Multimodal analgesia: multimodal analgesia used between 6 hours prior to anesthesia start to PACU discharge  Patient location during evaluation: PACU  Patient participation: complete - patient participated  Level of consciousness: awake  Pain score: 2  Pain management: adequate  Airway patency: patent  Anesthetic complications: no  Cardiovascular status: acceptable  Respiratory status: acceptable  Hydration status: acceptable  Comments: I have evaluated the patient and meets criteria for discharge from PACU. Tony Pollock MD  Post anesthesia nausea and vomiting:  controlled  Final Post Anesthesia Temperature Assessment:  Normothermia (36.0-37.5 degrees C)      INITIAL Post-op Vital signs:   Vitals Value Taken Time   /83 05/26/21 0145   Temp 36.4 °C (97.6 °F) 05/26/21 0144   Pulse 61 05/26/21 0155   Resp 9 05/26/21 0155   SpO2 100 % 05/26/21 0157   Vitals shown include unvalidated device data.

## 2021-05-26 NOTE — H&P
Surgery History and Physical    Subjective:      Angie Ontiveros is a 61 y.o. female who presents complaining of diffuse abdominal pain. The patient states this started about 5 days ago. She then had associated nausea and vomiting. She then had several days where she felt okay however the pain came back. She has been having some belching. She has been passing some gas and has had bowel movements. She denies any fever chills sweats. She has had frequency. No chest pain or shortness of breath. She has had a tubal ligation but no other abdominal surgery. Patient Active Problem List    Diagnosis Date Noted    Greater trochanteric bursitis of right hip 2019    Iliotibial band syndrome of right side 2019    Severe obesity (BMI 35.0-39.9) 2018    Vitamin D deficiency 2018    Long-term use of immunosuppressant medication 2017    Primary osteoarthritis of both knees 2017    Seropositive rheumatoid arthritis of multiple sites ([facility] [address] 10/24/2017    Anemia 10/24/2017     Past Medical History:   Diagnosis Date    Anemia     Reactive airway disease     Rheumatoid arthritis ([facility] [address] 10/24/2017      Past Surgical History:   Procedure Laterality Date    COLONOSCOPY N/A 1/15/2018    COLONOSCOPY performed by Shayla Harrington. Jennifer Prince MD at [facility] ENDOSCOPY    HX OTHER SURGICAL      pins placed in left wrist       Social History     Tobacco Use    Smoking status: Former Smoker     Quit date:      Years since quittin.4    Smokeless tobacco: Never Used   Substance Use Topics    Alcohol use: No      Family History   Problem Relation Age of Onset    Thyroid Disease Mother     High Cholesterol Mother     Heart Disease Father       Prior to Admission medications    Medication Sig Start Date End Date Taking? Authorizing Provider   fluticasone propionate (FLONASE) 50 mcg/actuation nasal spray 2 Sprays by Both Nostrils route daily.  3/16/21   Abdullahi Miller MD   folic acid (FOLVITE) 1 mg tablet Take 1 Tab by mouth daily. 2/16/21   Ramez Mak MD   ergocalciferol (ERGOCALCIFEROL) 1,250 mcg (50,000 unit) capsule Take 1 Cap by mouth every seven (7) days. Indications: vitamin D deficiency (high dose therapy) 2/16/21   Ramez Mak MD   Insulin Syringe-Needle U-100 (BD Insulin Syringe Ultra-Fine) 1 mL 31 gauge x 5/16 syrg Use to inject methotrexate once weekly 2/16/21   Ramez Mak MD   methotrexate 25 mg/mL chemo injection 1 mL by SubCUTAneous route every Monday. 2/22/21   Ramez Mak MD   tofacitinib Neha Zeeck XR) 11 mg Tb24 Take 1 tablet by mouth every day 2/10/21   Ramez Mak MD   methotrexate 25 mg/mL chemo injection 1 mL by SubCUTAneous route every Monday. 11/2/20   Ramez Mak MD   cetirizine (ZYRTEC) 10 mg tablet Take 1 Tab by mouth daily. 10/8/20   Adrian Ramirez MD   varicella-zoster recombinant, PF, (Shingrix, PF,) 50 mcg/0.5 mL susr injection 0.5mL by IntraMUSCular route once now and then repeat in 2-6 months 8/11/20   Tiana Hardwick MD   polyethylene glycol (MIRALAX) 17 gram/dose powder Take 17 g by mouth daily as needed (constipation). Prn constipation 7/22/19   Adrian Ramirez MD   Ibuprofen-diphenhydrAMINE (ADVIL PM) 200-38 mg tab Take 200 mg by mouth as needed. Provider, Historical     No Known Allergies     Review of Systems:    Pertinent items are noted in the History of Present Illness.      Objective:     Visit Vitals  BP (!) 176/92   Pulse 66   Temp 97.8 °F (36.6 °C)   Resp 16   SpO2 96%       Physical Exam:    GENERAL: alert, cooperative, no distress, appears stated age, EYE: negative, THROAT & NECK: normal, LUNG: clear to auscultation bilaterally, HEART: regular rate and rhythm, ABDOMEN: Soft with abdominal tenderness worse in the left upper quadrant, EXTREMITIES:  no edema, SKIN: Normal., NEUROLOGIC: negative, PSYCH: non focal    Imaging:  images and reports reviewed  CT-films directly reviewed with radiology  There appears to be a paraduodenal internal hernia. Lab Review:    Recent Results (from the past 24 hour(s))   CBC WITH AUTOMATED DIFF    Collection Time: 05/25/21  4:29 PM   Result Value Ref Range    WBC 12.9 (H) 3.6 - 11.0 K/uL    RBC 4.08 3.80 - 5.20 M/uL    HGB 11.9 11.5 - 16.0 g/dL    HCT 39.3 35.0 - 47.0 %    MCV 96.3 80.0 - 99.0 FL    MCH 29.2 26.0 - 34.0 PG    MCHC 30.3 30.0 - 36.5 g/dL    RDW 15.0 (H) 11.5 - 14.5 %    PLATELET 725 588 - 475 K/uL    MPV 9.8 8.9 - 12.9 FL    NRBC 0.0 0  WBC    ABSOLUTE NRBC 0.00 0.00 - 0.01 K/uL    NEUTROPHILS 90 (H) 32 - 75 %    LYMPHOCYTES 5 (L) 12 - 49 %    MONOCYTES 5 5 - 13 %    EOSINOPHILS 0 0 - 7 %    BASOPHILS 0 0 - 1 %    IMMATURE GRANULOCYTES 0 0.0 - 0.5 %    ABS. NEUTROPHILS 11.7 (H) 1.8 - 8.0 K/UL    ABS. LYMPHOCYTES 0.6 (L) 0.8 - 3.5 K/UL    ABS. MONOCYTES 0.6 0.0 - 1.0 K/UL    ABS. EOSINOPHILS 0.0 0.0 - 0.4 K/UL    ABS. BASOPHILS 0.0 0.0 - 0.1 K/UL    ABS. IMM. GRANS. 0.0 0.00 - 0.04 K/UL    DF SMEAR SCANNED      PLATELET COMMENTS Large Platelets      RBC COMMENTS ANISOCYTOSIS  1+        RBC COMMENTS MACROCYTOSIS  1+       METABOLIC PANEL, COMPREHENSIVE    Collection Time: 05/25/21  4:29 PM   Result Value Ref Range    Sodium 137 136 - 145 mmol/L    Potassium 4.3 3.5 - 5.1 mmol/L    Chloride 104 97 - 108 mmol/L    CO2 28 21 - 32 mmol/L    Anion gap 5 5 - 15 mmol/L    Glucose 108 (H) 65 - 100 mg/dL    BUN 13 6 - 20 MG/DL    Creatinine 0.77 0.55 - 1.02 MG/DL    BUN/Creatinine ratio 17 12 - 20      GFR est AA >60 >60 ml/min/1.73m2    GFR est non-AA >60 >60 ml/min/1.73m2    Calcium 9.7 8.5 - 10.1 MG/DL    Bilirubin, total 0.4 0.2 - 1.0 MG/DL    ALT (SGPT) 21 12 - 78 U/L    AST (SGOT) 26 15 - 37 U/L    Alk.  phosphatase 114 45 - 117 U/L    Protein, total 7.8 6.4 - 8.2 g/dL    Albumin 3.9 3.5 - 5.0 g/dL    Globulin 3.9 2.0 - 4.0 g/dL    A-G Ratio 1.0 (L) 1.1 - 2.2     SAMPLES BEING HELD    Collection Time: 05/25/21  4:29 PM   Result Value Ref Range    SAMPLES BEING HELD 1red COMMENT        Add-on orders for these samples will be processed based on acceptable specimen integrity and analyte stability, which may vary by analyte. LIPASE    Collection Time: 05/25/21  4:29 PM   Result Value Ref Range    Lipase 81 73 - 393 U/L   URINALYSIS W/MICROSCOPIC    Collection Time: 05/25/21  5:16 PM   Result Value Ref Range    Color YELLOW/STRAW      Appearance CLEAR CLEAR      Specific gravity 1.005 1.003 - 1.030      pH (UA) 8.5 (H) 5.0 - 8.0      Protein Negative NEG mg/dL    Glucose Negative NEG mg/dL    Ketone Negative NEG mg/dL    Bilirubin Negative NEG      Blood Negative NEG      Urobilinogen 0.2 0.2 - 1.0 EU/dL    Nitrites Negative NEG      Leukocyte Esterase Negative NEG      WBC 0-4 0 - 4 /hpf    RBC 0-5 0 - 5 /hpf    Epithelial cells FEW FEW /lpf    Bacteria Negative NEG /hpf   URINE CULTURE HOLD SAMPLE    Collection Time: 05/25/21  5:16 PM    Specimen: Serum; Urine   Result Value Ref Range    Urine culture hold        Urine on hold in Microbiology dept for 2 days. If unpreserved urine is submitted, it cannot be used for addtional testing after 24 hours, recollection will be required. Assessment: Plan   Periduodenal internal hernia. This is new since the patient's last CT scan about 2 years ago. I suspect that she may have obstructed it a few days ago and then the vomiting allowed it to reduce and now her pain is back. I believe she will require urgent operative intervention. We will try to do this laparoscopically but if unable to get the mesentery closed may require open laparotomy. Risks and benefits of the procedure have been discussed with the patient and her  at length and they have agreed to proceed.

## 2021-05-26 NOTE — ED NOTES
SBAR report given to Roland Jacobo RN in pre-op. Pt given chlorhexidine wipes to clean herself and a new gown. Updated on plan of care. Understanding verbalized.

## 2021-05-26 NOTE — PROGRESS NOTES
Bedside and Verbal shift change report given to Marisela Colón, [facility] (oncoming nurse) by Thai Cornell RN (offgoing nurse). Report included the following information SBAR, ED Summary, Procedure Summary, MAR and Recent Results.

## 2021-05-26 NOTE — ANESTHESIA PREPROCEDURE EVALUATION
Relevant Problems   No relevant active problems       Anesthetic History   No history of anesthetic complications            Review of Systems / Medical History  Patient summary reviewed, nursing notes reviewed and pertinent labs reviewed    Pulmonary  Within defined limits                 Neuro/Psych   Within defined limits           Cardiovascular  Within defined limits                     GI/Hepatic/Renal  Within defined limits             Comments: Recent N/V Endo/Other  Within defined limits      Obesity and arthritis     Other Findings            Physical Exam    Airway  Mallampati: II  TM Distance: > 6 cm  Neck ROM: normal range of motion   Mouth opening: Normal     Cardiovascular  Regular rate and rhythm,  S1 and S2 normal,  no murmur, click, rub, or gallop             Dental    Dentition: Full upper dentures     Pulmonary  Breath sounds clear to auscultation               Abdominal  GI exam deferred       Other Findings            Anesthetic Plan    ASA: 2  Anesthesia type: general          Induction: Intravenous  Anesthetic plan and risks discussed with: Patient

## 2021-05-26 NOTE — PERIOP NOTES
TRANSFER - OUT REPORT:    Verbal report given to Taylor(name) [facility]  [facility] to [facility](unit) for routine post - op       Report consisted of patients Situation, Background, Assessment and   Recommendations(SBAR). Information from the following report(s) SBAR, Procedure Summary, Intake/Output and MAR was reviewed with the receiving nurse. Lines:   Peripheral IV 05/25/21 Right Hand (Active)   Site Assessment Clean, dry, & intact 05/26/21 0149   Phlebitis Assessment 0 05/26/21 0149   Infiltration Assessment 0 05/26/21 0149   Dressing Status Occlusive 05/26/21 0149   Dressing Type Transparent 05/26/21 0149   Hub Color/Line Status Pink; Infusing 05/26/21 0149        Opportunity for questions and clarification was provided.       Patient transported with:   Registered Nurse

## 2021-05-26 NOTE — PERIOP NOTES
Received patient from surgery via stretcher,VSS. Abdomen with 3 sites covered with 2x2 and tegaderm     0140 Awake following commands, no complaints of pain at present.

## 2021-05-26 NOTE — PROGRESS NOTES
TRANSFER - IN REPORT:    Verbal report received from RODNEY Lindo (name) on Otila Spine  being received from PACU (unit) for routine post - op      Report consisted of patients Situation, Background, Assessment and   Recommendations(SBAR). Information from the following report(s) SBAR, OR Summary, Procedure Summary, Intake/Output and MAR was reviewed with the receiving nurse. Opportunity for questions and clarification was provided. Assessment completed upon patients arrival to unit and care assumed.

## 2021-05-27 VITALS
HEART RATE: 66 BPM | TEMPERATURE: 98.2 F | OXYGEN SATURATION: 96 % | DIASTOLIC BLOOD PRESSURE: 73 MMHG | SYSTOLIC BLOOD PRESSURE: 143 MMHG | RESPIRATION RATE: 17 BRPM

## 2021-05-27 LAB
BASOPHILS # BLD: 0 K/UL (ref 0–0.1)
BASOPHILS NFR BLD: 0 % (ref 0–1)
DIFFERENTIAL METHOD BLD: ABNORMAL
EOSINOPHIL # BLD: 0.1 K/UL (ref 0–0.4)
EOSINOPHIL NFR BLD: 1 % (ref 0–7)
ERYTHROCYTE [DISTWIDTH] IN BLOOD BY AUTOMATED COUNT: 15.3 % (ref 11.5–14.5)
HCT VFR BLD AUTO: 29.7 % (ref 35–47)
HGB BLD-MCNC: 9 G/DL (ref 11.5–16)
IMM GRANULOCYTES # BLD AUTO: 0.1 K/UL (ref 0–0.04)
IMM GRANULOCYTES NFR BLD AUTO: 1 % (ref 0–0.5)
LYMPHOCYTES # BLD: 1 K/UL (ref 0.8–3.5)
LYMPHOCYTES NFR BLD: 11 % (ref 12–49)
MCH RBC QN AUTO: 28.9 PG (ref 26–34)
MCHC RBC AUTO-ENTMCNC: 30.3 G/DL (ref 30–36.5)
MCV RBC AUTO: 95.5 FL (ref 80–99)
MONOCYTES # BLD: 0.7 K/UL (ref 0–1)
MONOCYTES NFR BLD: 8 % (ref 5–13)
NEUTS SEG # BLD: 7.1 K/UL (ref 1.8–8)
NEUTS SEG NFR BLD: 79 % (ref 32–75)
NRBC # BLD: 0 K/UL (ref 0–0.01)
NRBC BLD-RTO: 0 PER 100 WBC
PLATELET # BLD AUTO: 211 K/UL (ref 150–400)
PMV BLD AUTO: 10 FL (ref 8.9–12.9)
RBC # BLD AUTO: 3.11 M/UL (ref 3.8–5.2)
WBC # BLD AUTO: 9.1 K/UL (ref 3.6–11)

## 2021-05-27 PROCEDURE — 96375 TX/PRO/DX INJ NEW DRUG ADDON: CPT

## 2021-05-27 PROCEDURE — 96376 TX/PRO/DX INJ SAME DRUG ADON: CPT

## 2021-05-27 PROCEDURE — 36415 COLL VENOUS BLD VENIPUNCTURE: CPT

## 2021-05-27 PROCEDURE — 99218 HC RM OBSERVATION: CPT

## 2021-05-27 PROCEDURE — 99024 POSTOP FOLLOW-UP VISIT: CPT | Performed by: SURGERY

## 2021-05-27 PROCEDURE — 74011250636 HC RX REV CODE- 250/636: Performed by: SURGERY

## 2021-05-27 PROCEDURE — 85025 COMPLETE CBC W/AUTO DIFF WBC: CPT

## 2021-05-27 PROCEDURE — 74011250637 HC RX REV CODE- 250/637: Performed by: SURGERY

## 2021-05-27 RX ORDER — OXYCODONE AND ACETAMINOPHEN 5; 325 MG/1; MG/1
1-2 TABLET ORAL
Qty: 20 TABLET | Refills: 0 | Status: SHIPPED | OUTPATIENT
Start: 2021-05-27 | End: 2021-05-31

## 2021-05-27 RX ADMIN — ENOXAPARIN SODIUM 40 MG: 40 INJECTION SUBCUTANEOUS at 00:16

## 2021-05-27 RX ADMIN — KETOROLAC TROMETHAMINE 30 MG: 30 INJECTION, SOLUTION INTRAMUSCULAR; INTRAVENOUS at 00:17

## 2021-05-27 RX ADMIN — CETIRIZINE HYDROCHLORIDE 10 MG: 10 TABLET, FILM COATED ORAL at 09:33

## 2021-05-27 RX ADMIN — KETOROLAC TROMETHAMINE 30 MG: 30 INJECTION, SOLUTION INTRAMUSCULAR; INTRAVENOUS at 11:39

## 2021-05-27 RX ADMIN — SODIUM CHLORIDE, POTASSIUM CHLORIDE, SODIUM LACTATE AND CALCIUM CHLORIDE 125 ML/HR: 600; 310; 30; 20 INJECTION, SOLUTION INTRAVENOUS at 05:43

## 2021-05-27 RX ADMIN — KETOROLAC TROMETHAMINE 30 MG: 30 INJECTION, SOLUTION INTRAMUSCULAR; INTRAVENOUS at 05:43

## 2021-05-27 RX ADMIN — Medication 10 ML: at 05:43

## 2021-05-27 RX ADMIN — FOLIC ACID 1 MG: 1 TABLET ORAL at 09:33

## 2021-05-27 NOTE — PROGRESS NOTES
Observation notice provided in writing to patient and/or caregiver as well as verbal explanation of the policy. Patients who are in outpatient status also receive the Observation notice. Patient has received notice and or patient representative has received via secure email, fax, or certified mail based on patient representative's preference.      WENDY Bell/DONTRELL

## 2021-05-27 NOTE — PROGRESS NOTES
Progress Note    Patient: Alyssa Burleson MRN: [id number]  SSN: [id number]    YOB: 1960  Age: 61 y.o. Sex: female      Admit Date: 2021    2 Days Post-Op    Procedure:  Procedure(s):  INTERNAL HERNIA REPAIR LAPAROSCOPIC    Subjective:     Patient patient feeling much better. Complains of a small amount of heartburn but no nausea. She is tolerating some food. Objective:     Visit Vitals  BP (!) 143/73 (BP 1 Location: Left upper arm, BP Patient Position: At rest)   Pulse 66   Temp 98.2 °F (36.8 °C)   Resp 17   SpO2 96%       Temp (24hrs), Av °F (36.7 °C), Min:97.8 °F (36.6 °C), Max:98.3 °F (36.8 °C)      Physical Exam:    General alert and oriented no acute distress  Abdomen soft incisionally tender    Data Review: images and reports reviewed    Lab Review: All lab results for the last 24 hours reviewed. Recent Results (from the past 24 hour(s))   CBC WITH AUTOMATED DIFF    Collection Time: 21  4:48 AM   Result Value Ref Range    WBC 9.1 3.6 - 11.0 K/uL    RBC 3.11 (L) 3.80 - 5.20 M/uL    HGB 9.0 (L) 11.5 - 16.0 g/dL    HCT 29.7 (L) 35.0 - 47.0 %    MCV 95.5 80.0 - 99.0 FL    MCH 28.9 26.0 - 34.0 PG    MCHC 30.3 30.0 - 36.5 g/dL    RDW 15.3 (H) 11.5 - 14.5 %    PLATELET 387 341 - 678 K/uL    MPV 10.0 8.9 - 12.9 FL    NRBC 0.0 0  WBC    ABSOLUTE NRBC 0.00 0.00 - 0.01 K/uL    NEUTROPHILS 79 (H) 32 - 75 %    LYMPHOCYTES 11 (L) 12 - 49 %    MONOCYTES 8 5 - 13 %    EOSINOPHILS 1 0 - 7 %    BASOPHILS 0 0 - 1 %    IMMATURE GRANULOCYTES 1 (H) 0.0 - 0.5 %    ABS. NEUTROPHILS 7.1 1.8 - 8.0 K/UL    ABS. LYMPHOCYTES 1.0 0.8 - 3.5 K/UL    ABS. MONOCYTES 0.7 0.0 - 1.0 K/UL    ABS. EOSINOPHILS 0.1 0.0 - 0.4 K/UL    ABS. BASOPHILS 0.0 0.0 - 0.1 K/UL    ABS. IMM.  GRANS. 0.1 (H) 0.00 - 0.04 K/UL    DF AUTOMATED         Assessment:     Hospital Problems  Date Reviewed: 2021        Codes Class Noted POA    Internal hernia ICD-10-CM: K45.8  ICD-9-CM: 553.8  2021 Unknown Plan/Recommendations/Medical Decision Making:   Doing well   discharge home

## 2021-05-27 NOTE — DISCHARGE SUMMARY
Physician Discharge Summary     Patient ID:  Rosalind Nam  [id number]  61 y.o.  1960    Allergies: Patient has no known allergies. Admit Date: 5/25/2021    Discharge Date: 5/27/2021    * Admission Diagnoses: Internal hernia [K45.8]    * Discharge Diagnoses:    Hospital Problems as of 5/27/2021 Date Reviewed: 4/7/2021        Codes Class Noted - Resolved POA    Internal hernia ICD-10-CM: K45.8  ICD-9-CM: 553.8  5/26/2021 - Present Unknown               Admission Condition: Fair    * Discharge Condition: improved    * Procedures: Procedure(s):  INTERNAL HERNIA REPAIR LAPAROSCOPIC    * Hospital Course:   Patient presented complaining of abdominal pain. CT scan was concerning for internal hernia. She underwent laparoscopic repair emergently. Postoperatively she did much better she was started on a diet which she tolerated she was up and ambulating pain was under control and she was discharged home    Consults: None    Significant Diagnostic Studies: radiology: CT scan: Internal hernia     * Disposition: Home    Discharge Medications:   Current Discharge Medication List      START taking these medications    Details   oxyCODONE-acetaminophen (PERCOCET) 5-325 mg per tablet Take 1-2 Tablets by mouth every six (6) hours as needed for Pain for up to 4 days. Max Daily Amount: 8 Tablets. Qty: 20 Tablet, Refills: 0  Start date: 5/27/2021, End date: 5/31/2021    Associated Diagnoses: Internal hernia             * Follow-up Care/Patient Instructions:   Activity: See surgical instructions  Diet: Resume previous diet  Wound Care: As directed    Follow-up Information     Follow up With Specialties Details Why Contact Info    Mary Douglas MD General Surgery   [address]  [phone]        In 2 weeks For wound re-check     Isabel Galvan, [address] [id number]          Follow-up tests/labs     Signed:  Jillian Magdaleno MD  5/27/2021  11:42 AM

## 2021-05-27 NOTE — PROGRESS NOTES
I have reviewed discharge instructions with the patient. The patient verbalized understanding. Signs, symptoms, and side effects reviewed. Opportunity for questions and clarification allowed. Patient discharging home via private vehicle. Signed, hard copy of discharge instructions placed on patient's chart.

## 2021-05-28 ENCOUNTER — PATIENT OUTREACH (OUTPATIENT)
Dept: CASE MANAGEMENT | Age: 61
End: 2021-05-28

## 2021-06-01 DIAGNOSIS — M05.79 SEROPOSITIVE RHEUMATOID ARTHRITIS OF MULTIPLE SITES (HCC): ICD-10-CM

## 2021-06-03 DIAGNOSIS — M05.79 SEROPOSITIVE RHEUMATOID ARTHRITIS OF MULTIPLE SITES (HCC): ICD-10-CM

## 2021-06-04 RX ORDER — FOLIC ACID 1 MG/1
1 TABLET ORAL DAILY
Qty: 90 TABLET | Refills: 0 | Status: SHIPPED | OUTPATIENT
Start: 2021-06-04 | End: 2021-10-07 | Stop reason: SDUPTHER

## 2021-06-09 ENCOUNTER — OFFICE VISIT (OUTPATIENT)
Dept: SURGERY | Age: 61
End: 2021-06-09
Payer: MEDICARE

## 2021-06-09 ENCOUNTER — PATIENT OUTREACH (OUTPATIENT)
Dept: CASE MANAGEMENT | Age: 61
End: 2021-06-09

## 2021-06-09 VITALS
RESPIRATION RATE: 18 BRPM | DIASTOLIC BLOOD PRESSURE: 88 MMHG | BODY MASS INDEX: 34.93 KG/M2 | SYSTOLIC BLOOD PRESSURE: 133 MMHG | WEIGHT: 189.8 LBS | OXYGEN SATURATION: 98 % | TEMPERATURE: 98.7 F | HEIGHT: 62 IN | HEART RATE: 83 BPM

## 2021-06-09 DIAGNOSIS — Z09 POSTOPERATIVE EXAMINATION: Primary | ICD-10-CM

## 2021-06-09 PROCEDURE — 99024 POSTOP FOLLOW-UP VISIT: CPT | Performed by: SURGERY

## 2021-06-09 NOTE — PROGRESS NOTES
1. Have you been to the ER, urgent care clinic since your last visit? Hospitalized since your last visit? No     2. Have you seen or consulted any other health care providers outside of the [address] [place] [address] since your last visit? Include any pap smears or colon screening.  No

## 2021-06-09 NOTE — PROGRESS NOTES
Patient here for follow-up of her laparoscopic repair of internal hernia. She is having some incisional tenderness. She complains of some reflux. She is off all pain medicine she is on regular diet and having normal bowel movements.     Visit Vitals  /88 (BP 1 Location: Left upper arm, BP Patient Position: Sitting, BP Cuff Size: Adult)   Pulse 83   Temp 98.7 °F (37.1 °C) (Oral)   Resp 18   Ht 5' 2\" (1.575 m)   Wt 189 lb 12.8 oz (86.1 kg)   SpO2 98%   BMI 34.71 kg/m²     General alert and oriented no acute distress  Abdomen soft nontender nondistended incisions healing well without evidence of infection    Status post laparoscopic repair of internal hernia  Doing well  Continue to limit physical activity for the next several weeks  Diet as tolerated  May take Tums if needed  Follow-up as needed

## 2021-06-10 NOTE — PROGRESS NOTES
Called and spoke to patient. Goals      Prevent complications post hospitalization. 5/28/21 [facility] 5/25-5/27 Lap hernia repair   Reviewed discharge instructions with patient   Reviewed meds-education on new med.  Reviewed red flags: fever, drainage from incisions, redness/odor/swelling/pain from incisions, nausea,vomiting,diarrhea,abd pain.  SCOTTIE declined by patient   F/u surgeon, - 6/9 at [address] [place] [address] Given info on [facility] as resource.  Given CTN contact info if any questions/concerns.  CTN to check back in about a week, sooner [email or web address]  6/10/21  Reports she is feeling much better. Still healing, but progressing. Saw surgeon, , yesterday. He said all looked good and discharged her from his care. No red flags noted. Reminded to call if concerns. CTN to check back in about a week. mbt

## 2021-06-16 RX ORDER — FOLIC ACID 1 MG/1
TABLET ORAL
Qty: 90 TABLET | Refills: 5 | Status: SHIPPED | OUTPATIENT
Start: 2021-06-16

## 2021-06-22 ENCOUNTER — PATIENT OUTREACH (OUTPATIENT)
Dept: CASE MANAGEMENT | Age: 61
End: 2021-06-22

## 2021-06-29 ENCOUNTER — PATIENT OUTREACH (OUTPATIENT)
Dept: CASE MANAGEMENT | Age: 61
End: 2021-06-29

## 2021-06-29 NOTE — PROGRESS NOTES
Patient resolved from Transition of Care episode on 6/29/21. ACM/CTN was unsuccessful at contacting this patient today. Patient/family was provided the following resources and education related to COVID-19 during the initial call:                         Signs, symptoms and red flags related to COVID-19            CDC exposure and quarantine guidelines            [employer] exposure contact - [phone]            Contact for their local Department of Health                 Patient has not had any additional ED or hospital visits. No further outreach scheduled with this CTN/ACM. Episode of Care resolved. Patient has this CTN/ACM contact information if future needs arise.

## 2021-07-03 DIAGNOSIS — M05.79 SEROPOSITIVE RHEUMATOID ARTHRITIS OF MULTIPLE SITES (HCC): ICD-10-CM

## 2021-07-06 DIAGNOSIS — M05.79 SEROPOSITIVE RHEUMATOID ARTHRITIS OF MULTIPLE SITES (HCC): ICD-10-CM

## 2021-07-06 RX ORDER — METHOTREXATE 25 MG/ML
INJECTION, SOLUTION INTRA-ARTERIAL; INTRAMUSCULAR; INTRAVENOUS
Qty: 10 ML | Refills: 1 | Status: SHIPPED | OUTPATIENT
Start: 2021-07-06

## 2021-07-06 RX ORDER — METHOTREXATE 25 MG/ML
INJECTION, SOLUTION INTRA-ARTERIAL; INTRAMUSCULAR; INTRAVENOUS
Qty: 10 ML | Refills: 1 | Status: SHIPPED | OUTPATIENT
Start: 2021-07-06 | End: 2021-07-06 | Stop reason: SDUPTHER

## 2021-08-12 NOTE — TELEPHONE ENCOUNTER
Pt c/o that she has been exposed to some type of chemical, she was not specific. She wants to be tested. Pt was advised that she may have to be more specific about the exposure.     Symptoms-daily  headaches, bloody nose denies nausea denies loss of appetite happening for about one and one half months    BCB#[phone] [Negative] : Heme/Lymph

## 2021-09-17 ENCOUNTER — TELEPHONE (OUTPATIENT)
Dept: SURGERY | Age: 61
End: 2021-09-17

## 2021-09-17 NOTE — TELEPHONE ENCOUNTER
Patient's  stated that she will only speak to Dr. Rocio Chicas. He would not give me a reason for the call or what the concern was.

## 2021-09-17 NOTE — TELEPHONE ENCOUNTER
Patient stated that she has some concerns that she would like to talk about.     Patient would like a call back from Dr. Kellee Fernando

## 2021-10-07 ENCOUNTER — OFFICE VISIT (OUTPATIENT)
Dept: RHEUMATOLOGY | Age: 61
End: 2021-10-07
Payer: MEDICARE

## 2021-10-07 VITALS
WEIGHT: 167 LBS | RESPIRATION RATE: 18 BRPM | SYSTOLIC BLOOD PRESSURE: 113 MMHG | TEMPERATURE: 98 F | BODY MASS INDEX: 30.54 KG/M2 | DIASTOLIC BLOOD PRESSURE: 80 MMHG | HEART RATE: 76 BPM

## 2021-10-07 DIAGNOSIS — M05.79 SEROPOSITIVE RHEUMATOID ARTHRITIS OF MULTIPLE SITES (HCC): Primary | ICD-10-CM

## 2021-10-07 DIAGNOSIS — Z79.60 LONG-TERM USE OF IMMUNOSUPPRESSANT MEDICATION: ICD-10-CM

## 2021-10-07 PROCEDURE — G8510 SCR DEP NEG, NO PLAN REQD: HCPCS | Performed by: INTERNAL MEDICINE

## 2021-10-07 PROCEDURE — 99215 OFFICE O/P EST HI 40 MIN: CPT | Performed by: INTERNAL MEDICINE

## 2021-10-07 PROCEDURE — G8417 CALC BMI ABV UP PARAM F/U: HCPCS | Performed by: INTERNAL MEDICINE

## 2021-10-07 PROCEDURE — G8427 DOCREV CUR MEDS BY ELIG CLIN: HCPCS | Performed by: INTERNAL MEDICINE

## 2021-10-07 RX ORDER — ALBUTEROL SULFATE 90 UG/1
1 AEROSOL, METERED RESPIRATORY (INHALATION)
Qty: 18 G | Refills: 2 | Status: SHIPPED | OUTPATIENT
Start: 2021-10-07

## 2021-10-07 NOTE — LETTER
10/7/2021    Patient: Georgina Banks   YOB: 1960   Date of Visit: 10/7/2021     Darrion Ko MD  [address] [facility]    Dear Darrion Ko MD,      Thank you for referring Ms. Georgina Banks to [address] [place] [address] for evaluation. My notes for this consultation are attached. If you have questions, please do not hesitate to call me. I look forward to following your patient along with you.       Sincerely,    Mickey Judge MD

## 2021-10-07 NOTE — PROGRESS NOTES
REASON FOR VISIT    This is a follow-up visit for Ms. Roe for    ICD-10-CM   1. Seropositive rheumatoid arthritis of multiple sites ([facility] M05.79     Inflammatory arthritis phenotype includes:  Anti-CCP positive: yes (>250)  Rheumatoid factor positive: yes (51.6)  Erosive disease: no  Extra-articular manifestations include: bibasilar scarring    Immunosuppression Screening (4/07/2021): Quantiferon TB: negative  PPD:  Negative (1/10/2018)  Hepatitis B: negative  Hepatitis C: negative    Therapy History includes:  Current DMARD therapy includes: methotrexate 25 mg SubQ every Monday (9/28/2020 to 8/2021), Elissa Ness 11 mg XR (3/27/2018 to 8/2021)  Prior DMARD therapy includes: methotrexate 20 mg every Monday (3/2015 to 9/28/2020), Enbrel (6 weeks)  The following DMARDs have been ineffective: none  The following DMARDs were stopped because of side effects: Enbrel (injection site reaction, increased cough, other unknown)    HISTORY OF PRESENT ILLNESS    Ms. Aurelio Pham returns for a follow-up. On her last visit, I continued methotrexate 25 mg SubQ every Monday and Xeljanz 11 mg XR daily, which she has taken with good tolerance. She started it on 9/28/2020 without issues. She was hospitalized  with COVID in [place] in 8/2021 she was remdesivir. I reviewed [facility]. Today, she feels ok. She did something to her right 3rd finger which was stiff but has improved. She reports also have some discomfort in her right shoulder that resolved. It then involved the left shoulder that resolved. She denies pain, swelling, or stiffness in her hands or wrists. She is does not want to get COVID vaccine.     She denies fever, weight loss, blurred vision, vision loss, oral ulcers, ankle swelling, dry cough, dyspnea, nausea, vomiting, dysphagia, abdominal pain, black or bloody stool, fall since last visit, rash, easy bruising and increased thirst.    Most recent toxicity monitoring by blood work was done on 9/03/2021 and did not reveal any significant adverse effects, except Hct 33.8%. I reviewed the patient's interval medical history, surgical history, medications, and allergies. The patient has had any interval hospital admissions and infection from [facility] and emergency internalg hernia repair on 5/25/2021    REVIEW OF SYSTEMS    A comprehensive review of systems was performed and pertinent results are documented in the HPI, review of systems is otherwise non-contributory. PAST MEDICAL HISTORY    She has a past medical history of Anemia, Reactive airway disease, and Rheumatoid arthritis ([address] (10/24/2017). FAMILY HISTORY    Her family history includes Heart Disease in her father; High Cholesterol in her mother; Thyroid Disease in her mother. SOCIAL HISTORY    She reports that she quit smoking about 9 years ago. She has never used smokeless tobacco. She reports that she does not drink alcohol and does not use drugs. MEDICATIONS    Current Outpatient Medications   Medication Sig    albuterol (PROVENTIL HFA, VENTOLIN HFA, PROAIR HFA) 90 mcg/actuation inhaler Take 1 Puff by inhalation every six (6) hours as needed for Wheezing or Shortness of Breath.  folic acid (FOLVITE) 1 mg tablet TAKE 1 TABLET BY MOUTH DAILY    fluticasone propionate (FLONASE) 50 mcg/actuation nasal spray 2 Sprays by Both Nostrils route daily.  ergocalciferol (ERGOCALCIFEROL) 1,250 mcg (50,000 unit) capsule Take 1 Cap by mouth every seven (7) days. Indications: vitamin D deficiency (high dose therapy) (Patient taking differently: Take 50,000 Units by mouth every fourteen (14) days. Indications: vitamin D deficiency (high dose therapy))    cetirizine (ZYRTEC) 10 mg tablet Take 1 Tab by mouth daily.     methotrexate 25 mg/mL chemo injection INJECT 1ML SUBCUTANEOUS EVERY MONDAY (Patient not taking: Reported on 10/7/2021)    tofacitinib (Xeljanz XR) 11 mg Tb24 Take 1 tablet by mouth every day (Patient not taking: Reported on 10/7/2021)    methotrexate 25 mg/mL chemo injection 1 mL by SubCUTAneous route every Monday.  polyethylene glycol (MIRALAX) 17 gram/dose powder Take 17 g by mouth daily as needed (constipation). Prn constipation (Patient not taking: Reported on 10/7/2021)    Ibuprofen-diphenhydrAMINE (ADVIL PM) 200-38 mg tab Take 200 mg by mouth as needed. (Patient not taking: Reported on 10/7/2021)     No current facility-administered medications for this visit. ALLERGIES    No Known Allergies    PHYSICAL EXAMINATION    Visit Vitals  /80   Pulse 76   Temp 98 °F (36.7 °C)   Resp 18   Wt 167 lb (75.8 kg)   BMI 30.54 kg/m²     Body mass index is 30.54 kg/m². General: Patient is alert, oriented x 3, not in acute distress    HEENT:   Sclerae are not injected and appear moist.  There is no alopecia. Chest:  Decreased air entry bilaterally     Neurological exam:  Muscle strength is full in upper and lower extremities     Extremities:  Free of clubbing, cyanosis    Skin exam:    There are no rashes, no alopecia, no discoid lesions, no active Raynaud's, no livedo reticularis, no periungual erythema. Musculoskeletal exam:  A comprehensive musculoskeletal exam was performed for all joints of each upper and lower extremity and assessed for swelling, tenderness and range of motion.  Positive results are documented as below:    Right trochanteric tenderness  Right iliotibial band tenderness  Bilateral knee crepitus without effusion with hyperextension    Z-Deformities:   no  [place] Deformities:  no  Boutonierre's Deformities:  no  Ulnar Deviation:   no  MCP Subluxation:  no     Joint Count 10/7/2021 4/7/2021 10/7/2020 7/7/2020 1/7/2020 5/24/2019 11/6/2018   Patient pain (0-100) 2.5 1.5 20 40 40 60 10   MHAQ 0 0.375 0 1 0 0.875 0   Left wrist- Tender - 0 0 - - - -   Left wrist- Swollen - 0 0 - - - -   Left 1st MCP - Swollen - - - - - - -   Left 2nd PIP - Tender - - - 1 - - -   Left 2nd PIP - Swollen - - - 0 - - -   Left 3rd PIP - Tender - - - 1 - - -   Left 3rd PIP - Swollen - - - 0 - - -   Left 4th PIP - Tender - - - 1 - - -   Left 4th PIP - Swollen - - - 0 - - -   Left 5th PIP - Tender - - - 1 - - -   Left 5th PIP - Swollen - - - 0 - - -   Right shoulder - Tender - - - - 1 - 1   Right shoulder - Swollen - - - - 0 - 0   Right elbow - Tender - - - - - - -   Right elbow - Swollen - - - - - - -   Right wrist- Tender - - - - - - -   Right wrist- Swollen - - - - - - -   Right 1st MCP - Swollen - - - - - - -   Right 3rd MCP - Swollen - - - - - - -   Right 4th MCP - Tender - - - - - - -   Right 4th MCP - Swollen - - - - - - -   Right 2nd PIP - Tender - - - 1 - - -   Right 2nd PIP - Swollen - - - 0 - - -   Right 3rd PIP - Tender 1 - - 1 - 1 -   Right 3rd PIP - Swollen 1 - - 0 - 0 -   Right 4th PIP - Tender - - - 1 - - -   Right 4th PIP - Swollen - - - 0 - - -   Right 5th PIP - Tender - - - 1 - - -   Right 5th PIP - Swollen - - - 0 - - -   Tender Joint Count (Total) 1 0 0 8 1 1 1   Swollen Joint Count (Total) 1 0 0 0 0 0 0   Physician Assessment (0-10) 0.5 0 0 2 0.5 0.5 0   Patient Assessment (0-10) 0.25 1.5 5 5 2 1 1   CDAI Total (calculated) 2.75 1.5 5 15 3.5 2.5 2     DATA REVIEW    Laboratory     Recent laboratory results were reviewed, summarized, and discussed with the patient. Imaging    Musculoskeletal Ultrasound    Ultrasound of the right elbow. Indication: joint pain. (12/27/17)  Using a Bloomfire e with 12 Mhz probe, limited views of the right elbow were reviewed. This revealed hypoechoic dopplerable collection and anechoic collection within the olecranon fossa. The tendons were normal. Bony contours were regular without erosions seen. There were no soft tissue masses noted. Impression: right elbow synovitis with effusion    Radiographs    Chest 8/29/2021: No previous examinations are available for review. The lungs are clear.  No pleural abnormality is seen. There is no evidence of pneumothorax. The heart is normal in size. The mediastinum and bony structures are unremarkable. Chest 3/27/2018: clear lungs. There is a small calcification or bone island overlying the left costophrenic angle. Heart size is normal. There is no pulmonary edema. There is no evident pneumothorax, adenopathy or effusion. Bilateral Hand 12/27/2017: mild diffuse periarticular osteopenia. The joint space widths are normal. No erosive changes are shown. There is anatomic alignment. No pathological soft tissue calcifications or demonstrated. No localized areas of soft tissue swelling is shown. Right Elbow 12/27/2017: mild diffuse osteopenia. There is a small elbow effusion. There is mild to moderate uniform joint space narrowing which is greatest between the capitellum and radial head. No fracture is shown. No erosive changes are evident. Bilateral Foot 12/27/2017: mild diffuse periarticular osteopenia. Minimal joint space widths are demonstrated bilaterally. There are no erosive changes. No pathologic soft tissue calcifications or localized areas of soft tissue swelling are demonstrated. There is anatomic alignment. Deformity of the distal shaft of the left fifth metatarsal bone is shown suggesting remote, healed fracture. There is no evidence for acute fracture. CT Imaging    CT Chest without contrast 4/03/2018: The thyroid gland reveals no nodules. There is no axillary adenopathy. There are no enlarged mediastinal or hilar lymph nodes. There are no pleural or  ericardial effusions. No adrenal lesions. The lungs are well aerated. The central airways are patent. There are minimal linear areas of scarring at each lung base. No pneumonia. No pulmonary edema no evidence for fibrosis. Review of bone windows reveals no destructive lesions. MR Imaging    None    DXA    None    PFT    PFT 4/03/2018: FVC of 2.32 (90%), FEV1 of 1.73 (91%), FEV1/FVC of 74.8% and a DLCO of 15.11 (>80%), TLC 3.88 (96%), VC 2.28 (81%).      PATHOLOGY    Right Shoulder Kenalog 40 mg IA. (04/02/2020)   Kenalog 80 mg IM. (08/06/18)   Ultrasound Guided Right Elbow Kenalog 40 mg IA. (12/27/2017)    ASSESSMENT AND PLAN    This is a follow-up visit for Ms. Roe. 1) Seropositive Rheumatoid Arthritis. She was maintained on methotrexate 25 mg SubQ every Monday and Xeljanz 11 mg XR with good tolerance, but she had interval COVID infection so has been off treatment. She has been feeling intermittent joint symptoms and on exam, she has active joints. Her CDAI was 2.75 (previously 1.5, 5, 15, 3.5, 2.5, 2, 15.5, 23.5, 14.5, 6) with 1 tender and 1 swollen joints, consistent with low disease activity. I asked her to resume methotrexate. 2) Long Term Use of Immunosuppressants. The patient will resume on high risk immunomodulatory medications (methotrexate) and requires frequent toxicity monitoring by blood work to evaluate for toxicities. Respective labs were ordered (see below orders for details). 3) Bilateral Knee Osteoarthritis. This was not an active issue today. 4) Vitamin D Deficiency. Her level was 66.8 (previously 47.2, 21.2, 32.8, 21.3). She is on ergocalciferol weekly. 5) Dyslipidemia. .8 mg/dL     6) Right Trochanteric Bursitis with Right Iliotibial Band Syndrome. This was not an active issue today. I had referred her to physical therapy. 7) COVID. She was ill with COVID. She notes some mild chest discomfort. I prescribed albuterol; The patient voiced understanding of the aforementioned assessment and plan. A total of 45 minutes was spent on this visit, reviewing interval notes, interval testing results, ordering tests, refilling medications, documenting the findings in the note, patient education, counseling, and coordination of care as described above. All questions asked and answered.     TODAY'S ORDERS    Orders Placed This Encounter    albuterol (PROVENTIL HFA, VENTOLIN HFA, PROAIR HFA) 90 mcg/actuation inhaler     Future Appointments Date Time Provider Yolanda Neal   10/12/2021  6:15 PM Fox Stanton MD PAFP BS AMB   4/7/2022  2:00 PM Violet Lux MD AOCR BS AMB     Ciaran Luevano MD, [facility] [facility]    Adult Rheumatology   Rheumatology Ultrasound Certified  [facility]  A [facility] of [facility], [address] [place] [address]   Phone [phone]  Fax [phone]

## 2021-10-11 DIAGNOSIS — M05.79 SEROPOSITIVE RHEUMATOID ARTHRITIS OF MULTIPLE SITES (HCC): ICD-10-CM

## 2021-10-11 RX ORDER — METHOTREXATE 2.5 MG/1
TABLET ORAL
Qty: 96 TABLET | Refills: 0 | Status: SHIPPED | OUTPATIENT
Start: 2021-10-11 | End: 2022-01-10

## 2021-12-02 ENCOUNTER — DOCUMENTATION ONLY (OUTPATIENT)
Dept: RHEUMATOLOGY | Age: 61
End: 2021-12-02

## 2021-12-13 ENCOUNTER — TELEPHONE (OUTPATIENT)
Dept: FAMILY MEDICINE CLINIC | Age: 61
End: 2021-12-13

## 2021-12-13 NOTE — TELEPHONE ENCOUNTER
----- Message from Erasmo Martell sent at 12/13/2021 12:06 PM EST -----  Subject: Message to Provider    QUESTIONS  Information for Provider? NEED [facility] INFO FOR [place] FOR TB TEST   needed, please call patient back with info   ---------------------------------------------------------------------------  --------------  [address]  What is the best way for the office to contact you? Do not leave any   message, patient will call back for answer  Preferred Call Back Phone Number? [phone]  ---------------------------------------------------------------------------  --------------  SCRIPT ANSWERS  Relationship to Patient?  Self

## 2021-12-23 ENCOUNTER — TELEPHONE (OUTPATIENT)
Dept: FAMILY MEDICINE CLINIC | Age: 61
End: 2021-12-23

## 2022-03-18 PROBLEM — M05.79 SEROPOSITIVE RHEUMATOID ARTHRITIS OF MULTIPLE SITES (HCC): Status: ACTIVE | Noted: 2017-10-24

## 2022-03-18 PROBLEM — M76.31 ILIOTIBIAL BAND SYNDROME OF RIGHT SIDE: Status: ACTIVE | Noted: 2019-05-24

## 2022-03-19 PROBLEM — E55.9 VITAMIN D DEFICIENCY: Status: ACTIVE | Noted: 2018-03-27

## 2022-03-19 PROBLEM — M17.0 PRIMARY OSTEOARTHRITIS OF BOTH KNEES: Status: ACTIVE | Noted: 2017-12-27

## 2022-03-20 PROBLEM — M70.61 GREATER TROCHANTERIC BURSITIS OF RIGHT HIP: Status: ACTIVE | Noted: 2019-05-24

## 2022-03-20 PROBLEM — D64.9 ANEMIA: Status: ACTIVE | Noted: 2017-10-24

## 2022-03-20 PROBLEM — Z79.60 LONG-TERM USE OF IMMUNOSUPPRESSANT MEDICATION: Status: ACTIVE | Noted: 2017-12-27

## 2022-03-20 PROBLEM — K45.8 INTERNAL HERNIA: Status: ACTIVE | Noted: 2021-05-26

## 2022-03-23 ENCOUNTER — TELEPHONE (OUTPATIENT)
Dept: RHEUMATOLOGY | Age: 62
End: 2022-03-23

## 2022-03-23 DIAGNOSIS — M05.79 SEROPOSITIVE RHEUMATOID ARTHRITIS OF MULTIPLE SITES (HCC): Primary | ICD-10-CM

## 2022-03-23 DIAGNOSIS — Z79.60 LONG-TERM USE OF IMMUNOSUPPRESSANT MEDICATION: ICD-10-CM

## 2022-03-23 NOTE — TELEPHONE ENCOUNTER
Left message for pt stating that we need to have recent labs done in order to sign off on her prescription form for her Yazmin Leger pt assistance.   I will mail the lab slips to her, and I asked her to call us if she wants to come here and get her labs done instead of going to [address] [place] [address].

## 2022-03-23 NOTE — TELEPHONE ENCOUNTER
Pt called stating [employer] informed her they need a doctor to authorize the prescription for Williams Vujazmine. Scheduled pt with Dr. Ade Seo next available.     [phone]

## 2022-03-29 ENCOUNTER — APPOINTMENT (OUTPATIENT)
Dept: FAMILY MEDICINE CLINIC | Age: 62
End: 2022-03-29

## 2022-03-30 LAB
ALBUMIN SERPL-MCNC: 4.5 G/DL (ref 3.8–4.8)
ALBUMIN/GLOB SERPL: 2 {RATIO} (ref 1.2–2.2)
ALP SERPL-CCNC: 103 IU/L (ref 44–121)
ALT SERPL-CCNC: 14 IU/L (ref 0–32)
AST SERPL-CCNC: 15 IU/L (ref 0–40)
BASOPHILS # BLD AUTO: 0 X10E3/UL (ref 0–0.2)
BASOPHILS NFR BLD AUTO: 0 %
BILIRUB SERPL-MCNC: 0.4 MG/DL (ref 0–1.2)
BUN SERPL-MCNC: 12 MG/DL (ref 8–27)
BUN/CREAT SERPL: 18 (ref 12–28)
CALCIUM SERPL-MCNC: 9.5 MG/DL (ref 8.7–10.3)
CHLORIDE SERPL-SCNC: 104 MMOL/L (ref 96–106)
CO2 SERPL-SCNC: 24 MMOL/L (ref 20–29)
CREAT SERPL-MCNC: 0.65 MG/DL (ref 0.57–1)
CRP SERPL-MCNC: 3 MG/L (ref 0–10)
EGFR: 100 ML/MIN/1.73
EOSINOPHIL # BLD AUTO: 0.1 X10E3/UL (ref 0–0.4)
EOSINOPHIL NFR BLD AUTO: 1 %
ERYTHROCYTE [DISTWIDTH] IN BLOOD BY AUTOMATED COUNT: 14.5 % (ref 11.7–15.4)
ERYTHROCYTE [SEDIMENTATION RATE] IN BLOOD BY WESTERGREN METHOD: 19 MM/HR (ref 0–40)
GLOBULIN SER CALC-MCNC: 2.3 G/DL (ref 1.5–4.5)
GLUCOSE SERPL-MCNC: 85 MG/DL (ref 65–99)
HBV CORE AB SERPL QL IA: NEGATIVE
HBV CORE IGM SERPL QL IA: NEGATIVE
HBV E AB SERPL QL IA: NEGATIVE
HBV SURFACE AB SER QL: NON REACTIVE
HBV SURFACE AG SERPL QL IA: NEGATIVE
HCT VFR BLD AUTO: 34.6 % (ref 34–46.6)
HCV AB S/CO SERPL IA: <0.1 S/CO RATIO (ref 0–0.9)
HGB BLD-MCNC: 11.3 G/DL (ref 11.1–15.9)
IMM GRANULOCYTES # BLD AUTO: 0 X10E3/UL (ref 0–0.1)
IMM GRANULOCYTES NFR BLD AUTO: 0 %
LYMPHOCYTES # BLD AUTO: 1.2 X10E3/UL (ref 0.7–3.1)
LYMPHOCYTES NFR BLD AUTO: 15 %
MCH RBC QN AUTO: 30.8 PG (ref 26.6–33)
MCHC RBC AUTO-ENTMCNC: 32.7 G/DL (ref 31.5–35.7)
MCV RBC AUTO: 94 FL (ref 79–97)
MONOCYTES # BLD AUTO: 0.5 X10E3/UL (ref 0.1–0.9)
MONOCYTES NFR BLD AUTO: 7 %
NEUTROPHILS # BLD AUTO: 6 X10E3/UL (ref 1.4–7)
NEUTROPHILS NFR BLD AUTO: 77 %
PLATELET # BLD AUTO: 330 X10E3/UL (ref 150–450)
POTASSIUM SERPL-SCNC: 4.5 MMOL/L (ref 3.5–5.2)
PROT SERPL-MCNC: 6.8 G/DL (ref 6–8.5)
RBC # BLD AUTO: 3.67 X10E6/UL (ref 3.77–5.28)
SODIUM SERPL-SCNC: 144 MMOL/L (ref 134–144)
WBC # BLD AUTO: 7.9 X10E3/UL (ref 3.4–10.8)

## 2022-04-02 LAB
GAMMA INTERFERON BACKGROUND BLD IA-ACNC: 0 IU/ML
M TB IFN-G BLD-IMP: NEGATIVE
M TB IFN-G CD4+ BCKGRND COR BLD-ACNC: 0.01 IU/ML
MITOGEN IGNF BLD-ACNC: 3.3 IU/ML
QUANTIFERON TB2 AG: 0 IU/ML
SERVICE CMNT-IMP: NORMAL

## 2022-06-15 ENCOUNTER — TELEPHONE (OUTPATIENT)
Dept: RHEUMATOLOGY | Age: 62
End: 2022-06-15

## 2022-12-22 NOTE — PROGRESS NOTES
Care Transitions Initial Call    Call within 2 business days of discharge: Yes     Patient: Mary Velazco Patient : 1960 MRN: [id number]    Last Discharge [address]       Complaint Diagnosis Description Type Department Provider    21 Abdominal Pain Internal hernia ED to Hosp-Admission (Discharged) (ADMIT) Anna Vallejo MD; Gokul Moreno... Was this an external facility discharge? No     Challenges to be reviewed by the provider   Additional needs identified to be addressed with provider no  [facility] - Lap hernia repair. Method of communication with provider : chart routing    Discussed [id number] related testing which was not done at this time. Advance Care Planning:   Does patient have an Advance Directive: not on file     Inpatient Readmission Risk score: No data recorded  Was this a readmission? no   Patient stated reason for the admission: abdominal pain    Patients top risk factors for readmission: medical condition-sp lap hernia repai. H/o RA and anemia. Interventions to address risk factors: Scheduled appointment with PCP-declined SCOTTIE with pcp at this time. , Scheduled appointment with Specialist-  at 9am and Obtained and reviewed discharge summary and/or continuity of care documents    Care Transition Nurse (CTN) contacted the patient by telephone to perform post hospital discharge assessment. Verified name and  with patient as identifiers. Provided introduction to self, and explanation of the CTN role. Reports she feels good. Pain when moving, but more of a soreness. Rates it about #3 on pain scale. Has not taken any of the Percocet. Managing with Motrin for now. Reminded to make sure she does not take Motrin on empty stomach. CTN reviewed discharge instructions, medical action plan and red flags with patient who verbalized understanding. Were discharge instructions available to patient? yes.  Reviewed appropriate site of care Problem: Falls - Risk of  Goal: *Absence of Falls  Description: Document Maddi Marking Fall Risk and appropriate interventions in the flowsheet.   Outcome: Progressing Towards Goal  Note: Fall Risk Interventions:                                Problem: Patient Education: Go to Patient Education Activity  Goal: Patient/Family Education  Outcome: Progressing Towards Goal     Problem: Complex Spine Procedure:  Day of Surgery  Goal: Activity/Safety  Outcome: Progressing Towards Goal based on symptoms and resources available to patient including: PCP, Specialist, Urgent Care Clinics, When to call 911 and [address]. Patient given an opportunity to ask questions and does not have any further questions or concerns at this time. The patient agrees to contact the PCP office for questions related to their healthcare. Medication reconciliation was performed with patient, who verbalizes understanding of administration of home medications. Advised obtaining a 90-day supply of all daily and as-needed medications. Referral to Pharm D needed: no     Home Health/Outpatient orders at discharge: [address]: na  Date of initial visit: na    Durable Medical Equipment ordered at discharge: none  [facility] [address] received: na    Covid Risk Education    Educated patient about risk for severe COVID-19 due to risk factors according to CDC guidelines. CTN reviewed discharge instructions, medical action plan and red flag symptoms patient who verbalized understanding. Discussed COVID vaccination status no. Education provided on COVID-19 vaccination as appropriate. Discussed exposure protocols and quarantine with CDC Guidelines. Patient was given an opportunity to verbalize any questions and concerns and agrees to contact CTN or health care provider for questions related to their healthcare. Was patient discharged with a pulse oximeter? no     Discussed follow-up appointments. If no appointment was previously scheduled, appointment scheduling offered: yes Is follow up appointment scheduled within 7 days of discharge?  yes   [facility] follow up appointment(s):   Future Appointments   Date Time Provider Yolanda Neal   6/9/2021  9:15 AM Lizeth Gusman MD [facility] BS AMB   10/7/2021  1:20 PM Hari Lux MD [facility] AMB     Non-[facility] follow up appointment(s): na    Plan for follow-up call in 7-10 days based on severity of symptoms and risk factors. Plan for next call: symptom management-assess current symptoms, self management-following discharge instructions, follow up appointment-saw surgeon for f/u appt (declined SCOTTIE with pcp) and medication management-taking meds as ordered. CTN provided contact information for future needs. Goals Addressed                 This Visit's Progress     Prevent complications post hospitalization. 5/28/21 [facility] 5/25-5/27 Lap hernia repair   Reviewed discharge instructions with patient   Reviewed meds-education on new med.  Reviewed red flags: fever, drainage from incisions, redness/odor/swelling/pain from incisions, nausea,vomiting,diarrhea,abd pain.  SCOTTIE declined by patient   F/u surgeon, - 6/9 at [address] [place] [address] Given info on [facility] as resource.  Given CTN contact info if any questions/concerns.    CTN to check back in about a week, sooner [email or web address]

## 2023-02-11 ENCOUNTER — HOSPITAL ENCOUNTER (EMERGENCY)
Age: 63
Discharge: HOME | End: 2023-02-11
Attending: EMERGENCY MEDICINE
Payer: MEDICARE

## 2023-02-11 VITALS
DIASTOLIC BLOOD PRESSURE: 91 MMHG | SYSTOLIC BLOOD PRESSURE: 166 MMHG | RESPIRATION RATE: 18 BRPM | HEART RATE: 64 BPM | OXYGEN SATURATION: 100 % | TEMPERATURE: 97.6 F

## 2023-02-11 DIAGNOSIS — M19.90 ARTHRITIS PAIN: Primary | ICD-10-CM

## 2023-02-11 PROCEDURE — 74011250637 HC RX REV CODE- 250/637: Performed by: EMERGENCY MEDICINE

## 2023-02-11 PROCEDURE — 74011250636 HC RX REV CODE- 250/636: Performed by: EMERGENCY MEDICINE

## 2023-02-11 PROCEDURE — 96372 THER/PROPH/DIAG INJ SC/IM: CPT

## 2023-02-11 PROCEDURE — 74011000250 HC RX REV CODE- 250: Performed by: EMERGENCY MEDICINE

## 2023-02-11 PROCEDURE — 99284 EMERGENCY DEPT VISIT MOD MDM: CPT

## 2023-02-11 RX ORDER — DEXAMETHASONE SODIUM PHOSPHATE 10 MG/ML
10 INJECTION INTRAMUSCULAR; INTRAVENOUS
Status: COMPLETED | OUTPATIENT
Start: 2023-02-11 | End: 2023-02-11

## 2023-02-11 RX ORDER — LIDOCAINE 4 G/100G
1 PATCH TOPICAL
Status: DISCONTINUED | OUTPATIENT
Start: 2023-02-11 | End: 2023-02-11 | Stop reason: HOSPADM

## 2023-02-11 RX ORDER — KETOROLAC TROMETHAMINE 30 MG/ML
30 INJECTION, SOLUTION INTRAMUSCULAR; INTRAVENOUS
Status: COMPLETED | OUTPATIENT
Start: 2023-02-11 | End: 2023-02-11

## 2023-02-11 RX ORDER — PREDNISONE 20 MG/1
TABLET ORAL
Qty: 15 TABLET | Refills: 0 | Status: SHIPPED | OUTPATIENT
Start: 2023-02-11

## 2023-02-11 RX ORDER — LIDOCAINE 50 MG/G
PATCH TOPICAL
Qty: 5 EACH | Refills: 1 | Status: SHIPPED | OUTPATIENT
Start: 2023-02-11

## 2023-02-11 RX ORDER — ONDANSETRON 4 MG/1
4 TABLET, ORALLY DISINTEGRATING ORAL
Status: COMPLETED | OUTPATIENT
Start: 2023-02-11 | End: 2023-02-11

## 2023-02-11 RX ORDER — KETOROLAC TROMETHAMINE 10 MG/1
10 TABLET, FILM COATED ORAL 2 TIMES DAILY
Qty: 16 TABLET | Refills: 0 | Status: SHIPPED | OUTPATIENT
Start: 2023-02-11

## 2023-02-11 RX ORDER — FAMOTIDINE 20 MG/1
20 TABLET, FILM COATED ORAL 2 TIMES DAILY
Qty: 20 TABLET | Refills: 0 | Status: SHIPPED | OUTPATIENT
Start: 2023-02-11 | End: 2023-02-21

## 2023-02-11 RX ORDER — FAMOTIDINE 20 MG/1
20 TABLET, FILM COATED ORAL
Status: COMPLETED | OUTPATIENT
Start: 2023-02-11 | End: 2023-02-11

## 2023-02-11 RX ADMIN — KETOROLAC TROMETHAMINE 30 MG: 30 INJECTION, SOLUTION INTRAMUSCULAR at 13:19

## 2023-02-11 RX ADMIN — FAMOTIDINE 20 MG: 20 TABLET, FILM COATED ORAL at 13:18

## 2023-02-11 RX ADMIN — DEXAMETHASONE SODIUM PHOSPHATE 10 MG: 10 INJECTION INTRAMUSCULAR; INTRAVENOUS at 13:19

## 2023-02-11 RX ADMIN — ONDANSETRON 4 MG: 4 TABLET, ORALLY DISINTEGRATING ORAL at 13:18

## 2023-02-11 NOTE — ED PROVIDER NOTES
Arm Pain   Associated symptoms include neck pain. Neck Pain   Pertinent negatives include no photophobia, no chest pain and no headaches. Patient is a 69-year-old female with past medical history significant for anemia, reactive airway disease, and rheumatoid arthritis who presents to the ED with generalized body aches related to her arthritis. She states that she had COVID in  and was hospitalized in [place]. She has not been under the care of a rheumatologist in the past year. She is requesting steroids for pain control until she can get re-established with a PCP and rheumatologist.  She states that she was on Xeljanz, methotrexate and folic acid previously with good pain control. She is still taking folic acid. Denies fever, cold symptoms, headache, neck pain, visual changes, focal weakness, unexplained weight changes or rash. Denies any difficulty breathing, difficulty swallowing, SOB or chest pain. Denies any nausea, vomiting or diarrhea. Old charts reviewed. Past Medical History:   Diagnosis Date    Anemia     Reactive airway disease     Rheumatoid arthritis ([facility] [address] 10/24/2017       Past Surgical History:   Procedure Laterality Date    COLONOSCOPY N/A 1/15/2018    COLONOSCOPY performed by Luis Alberto Crouch. Hayley Cota MD at [facility] ENDOSCOPY    HX OTHER SURGICAL      pins placed in left wrist          Family History:   Problem Relation Age of Onset    Thyroid Disease Mother     High Cholesterol Mother     Heart Disease Father        Social History     Socioeconomic History    Marital status:      Spouse name: Not on file    Number of children: Not on file    Years of education: Not on file    Highest education level: Not on file   Occupational History    Not on file   Tobacco Use    Smoking status: Former     Types: Cigarettes     Quit date:      Years since quittin.1    Smokeless tobacco: Never   Vaping Use    Vaping Use: Never used   Substance and Sexual Activity    Alcohol use:  No Drug use: No    Sexual activity: Never   Other Topics Concern     Service Not Asked    Blood Transfusions Not Asked    Caffeine Concern Not Asked    Occupational Exposure Not Asked    Hobby Hazards Not Asked    Sleep Concern Not Asked    Stress Concern Not Asked    Weight Concern Not Asked    Special Diet Not Asked    Back Care Not Asked    Exercise Not Asked    Bike Helmet Not Asked    Seat Belt Not Asked    Self-Exams Not Asked   Social History Narrative    Not on file     Social Determinants of Health     Financial Resource Strain: Not on file   Food Insecurity: Not on file   Transportation Needs: Not on file   Physical Activity: Not on file   Stress: Not on file   Social Connections: Not on file   Intimate Partner Violence: Not on file   Housing Stability: Not on file         ALLERGIES: Patient has no known allergies. Review of Systems   Constitutional:  Negative for activity change, appetite change, fever and unexpected weight change. HENT:  Negative for congestion, rhinorrhea, sore throat and trouble swallowing. Eyes:  Negative for photophobia. Respiratory:  Negative for cough, chest tightness and shortness of breath. Cardiovascular:  Negative for chest pain, palpitations and leg swelling. Gastrointestinal:  Negative for abdominal pain, constipation, diarrhea, nausea and vomiting. Genitourinary:  Negative for dysuria. Musculoskeletal:  Positive for arthralgias, joint swelling and neck pain. Skin:  Negative for rash. Neurological:  Negative for dizziness, light-headedness and headaches. All other systems reviewed and are negative. Vitals:    02/11/23 1210   BP: (!) 166/91   Pulse: 64   Resp: 18   Temp: 97.6 °F (36.4 °C)   SpO2: 100%            Physical Exam  Vitals and nursing note reviewed. Constitutional:       General: She is not in acute distress. Appearance: Normal appearance. She is normal weight. She is not ill-appearing, toxic-appearing or diaphoretic. Comments: Female; non smoker   HENT:      Head: Normocephalic. Right Ear: Tympanic membrane normal.      Left Ear: Tympanic membrane normal.      Mouth/Throat:      Mouth: Mucous membranes are moist.      Pharynx: No posterior oropharyngeal erythema. Cardiovascular:      Rate and Rhythm: Normal rate and regular rhythm. Pulmonary:      Effort: Pulmonary effort is normal.      Breath sounds: Normal breath sounds. Abdominal:      General: Bowel sounds are normal.      Palpations: Abdomen is soft. There is no mass. Tenderness: There is no abdominal tenderness. There is no guarding or rebound. Hernia: No hernia is present. Musculoskeletal:         General: Tenderness present. No swelling, deformity or signs of injury. Cervical back: Normal range of motion. No tenderness. Right lower leg: No edema. Left lower leg: No edema. Comments: Reports intermittent generalized joint pain secondary to her rheumatoid arthritis; Skin integrity is intact. There is no obvious new bony or soft tissue deformity; no rash, bruising or erythema. Good neurovascular sensation. No apparent tendon or nerve injury. Lymphadenopathy:      Cervical: No cervical adenopathy. Skin:     General: Skin is warm and dry. Findings: No bruising, erythema or rash. Neurological:      General: No focal deficit present. Mental Status: She is alert and oriented to person, place, and time. Medical Decision Making  Risk  OTC drugs. Prescription drug management. Procedures      Patient has been reexamined and denies any further complaints of pain or discomfort. We will try short course of Lidoderm patches, Toradol, prednisone and pepcid. Rheumatology and PCP referrals were given. 1:22 PM  Patient's results and plan of care have been reviewed with the pt and her son.   Patient and/or family have verbally conveyed their understanding and agreement of the patient's signs, symptoms, diagnosis, treatment and prognosis and additionally agree to follow up as recommended or return to the Emergency Room should her condition change prior to follow-up. Discharge instructions have also been provided to the patient with some educational information regarding her diagnosis as well a list of reasons why she  would want to return to the ER prior to her follow-up appointment should her condition change. Sina Londono, NP

## 2023-02-11 NOTE — ED TRIAGE NOTES
TRIAGE NOTE:  Patient arrives ambulatory with c/o severe pain in arms, and neck. Patient reports history of rheumatoid arthritis. Patient reports taking advil with no relief.

## 2023-04-02 ENCOUNTER — HOSPITAL ENCOUNTER (EMERGENCY)
Age: 63
Discharge: HOME | End: 2023-04-02
Attending: STUDENT IN AN ORGANIZED HEALTH CARE EDUCATION/TRAINING PROGRAM
Payer: MEDICARE

## 2023-04-02 DIAGNOSIS — M05.79 RHEUMATOID ARTHRITIS INVOLVING MULTIPLE SITES WITH POSITIVE RHEUMATOID FACTOR (HCC): Primary | ICD-10-CM

## 2023-04-02 PROCEDURE — 99283 EMERGENCY DEPT VISIT LOW MDM: CPT

## 2023-04-02 PROCEDURE — 74011250637 HC RX REV CODE- 250/637: Performed by: STUDENT IN AN ORGANIZED HEALTH CARE EDUCATION/TRAINING PROGRAM

## 2023-04-02 PROCEDURE — 74011000250 HC RX REV CODE- 250: Performed by: STUDENT IN AN ORGANIZED HEALTH CARE EDUCATION/TRAINING PROGRAM

## 2023-04-02 RX ORDER — ACETAMINOPHEN 500 MG
1000 TABLET ORAL ONCE
Status: COMPLETED | OUTPATIENT
Start: 2023-04-02 | End: 2023-04-02

## 2023-04-02 RX ORDER — LIDOCAINE 50 MG/G
PATCH TOPICAL
Qty: 5 EACH | Refills: 0 | Status: SHIPPED | OUTPATIENT
Start: 2023-04-02

## 2023-04-02 RX ORDER — ACETAMINOPHEN 325 MG/1
650 TABLET ORAL
Qty: 20 TABLET | Refills: 0 | Status: SHIPPED | OUTPATIENT
Start: 2023-04-02

## 2023-04-02 RX ORDER — LIDOCAINE 4 G/100G
1 PATCH TOPICAL
Status: DISCONTINUED | OUTPATIENT
Start: 2023-04-02 | End: 2023-04-02 | Stop reason: HOSPADM

## 2023-04-02 RX ADMIN — ACETAMINOPHEN 1000 MG: 500 TABLET ORAL at 16:58

## 2023-04-02 NOTE — ED TRIAGE NOTES
Patient arrives to ED complaining of rash to bilateral hands, and joint pain. Patient reports she has a history of RA. Seen in ED 1 month ago for the same.     Patient does not have a current Rheumatologist. Patient currently taking Methotrexate  for 3 weeks without relief

## 2023-05-23 RX ORDER — ACETAMINOPHEN 325 MG/1
650 TABLET ORAL EVERY 4 HOURS PRN
COMMUNITY
Start: 2023-04-02

## 2023-05-23 RX ORDER — PREDNISONE 20 MG/1
TABLET ORAL
COMMUNITY
Start: 2023-02-11

## 2023-05-23 RX ORDER — CETIRIZINE HYDROCHLORIDE 10 MG/1
1 TABLET ORAL DAILY
COMMUNITY
Start: 2021-10-08

## 2023-05-23 RX ORDER — ERGOCALCIFEROL 1.25 MG/1
50000 CAPSULE ORAL
COMMUNITY
Start: 2021-02-16

## 2023-05-23 RX ORDER — POLYETHYLENE GLYCOL 3350 17 G/17G
17 POWDER, FOR SOLUTION ORAL DAILY PRN
COMMUNITY
Start: 2019-07-22

## 2023-05-23 RX ORDER — ALBUTEROL SULFATE 90 UG/1
1 AEROSOL, METERED RESPIRATORY (INHALATION) EVERY 6 HOURS PRN
COMMUNITY
Start: 2021-10-07

## 2023-05-23 RX ORDER — FOLIC ACID 1 MG/1
1 TABLET ORAL DAILY
COMMUNITY
Start: 2021-06-16

## 2023-05-23 RX ORDER — TOFACITINIB 11 MG/1
1 TABLET, FILM COATED, EXTENDED RELEASE ORAL DAILY
COMMUNITY
Start: 2021-02-10

## 2023-05-23 RX ORDER — METHOTREXATE SODIUM 25 MG/ML
INJECTION, SOLUTION INTRA-ARTERIAL; INTRAMUSCULAR; INTRAVENOUS
COMMUNITY
Start: 2020-11-02

## 2023-05-23 RX ORDER — FLUTICASONE PROPIONATE 50 MCG
SPRAY, SUSPENSION (ML) NASAL
COMMUNITY
Start: 2021-10-08

## 2023-05-23 RX ORDER — KETOROLAC TROMETHAMINE 10 MG/1
10 TABLET, FILM COATED ORAL 2 TIMES DAILY
COMMUNITY
Start: 2023-02-11

## 2023-05-23 RX ORDER — LIDOCAINE 50 MG/G
PATCH TOPICAL
COMMUNITY
Start: 2023-02-11

## 2023-05-24 ENCOUNTER — TELEPHONE (OUTPATIENT)
Age: 63
End: 2023-05-24

## 2023-05-24 NOTE — TELEPHONE ENCOUNTER
PT called and requested for recent office notes and labs to be faxed to [facility] hematology and oncology.  PT provided fax number    [phone]

## 2023-05-25 ENCOUNTER — TELEPHONE (OUTPATIENT)
Age: 63
End: 2023-05-25

## 2023-05-25 NOTE — TELEPHONE ENCOUNTER
PT called because she needed the first and last appointment dates, I provided her with the information

## 2025-03-04 ENCOUNTER — HOSPITAL ENCOUNTER (EMERGENCY)
Facility: HOSPITAL | Age: 65
Discharge: HOME | End: 2025-03-04
Attending: EMERGENCY MEDICINE
Payer: COMMERCIAL

## 2025-03-04 ENCOUNTER — APPOINTMENT (OUTPATIENT)
Facility: HOSPITAL | Age: 65
End: 2025-03-04
Payer: COMMERCIAL

## 2025-03-04 VITALS
SYSTOLIC BLOOD PRESSURE: 134 MMHG | RESPIRATION RATE: 18 BRPM | OXYGEN SATURATION: 98 % | TEMPERATURE: 98.6 F | DIASTOLIC BLOOD PRESSURE: 86 MMHG | BODY MASS INDEX: 27.14 KG/M2 | HEART RATE: 86 BPM | WEIGHT: 153.22 LBS

## 2025-03-04 DIAGNOSIS — M54.12 CERVICAL RADICULOPATHY: ICD-10-CM

## 2025-03-04 DIAGNOSIS — M47.812 CERVICAL ARTHRITIS: Primary | ICD-10-CM

## 2025-03-04 LAB
ALBUMIN SERPL-MCNC: 3.3 G/DL (ref 3.5–5)
ALBUMIN/GLOB SERPL: 0.8 (ref 1.1–2.2)
ALP SERPL-CCNC: 112 U/L (ref 45–117)
ALT SERPL-CCNC: 14 U/L (ref 12–78)
ANION GAP SERPL CALC-SCNC: 3 MMOL/L (ref 2–12)
AST SERPL-CCNC: 19 U/L (ref 15–37)
BASOPHILS # BLD: 0.04 K/UL (ref 0–0.1)
BASOPHILS NFR BLD: 0.5 % (ref 0–1)
BILIRUB SERPL-MCNC: 0.5 MG/DL (ref 0.2–1)
BUN SERPL-MCNC: 13 MG/DL (ref 6–20)
BUN/CREAT SERPL: 19 (ref 12–20)
CALCIUM SERPL-MCNC: 9.4 MG/DL (ref 8.5–10.1)
CHLORIDE SERPL-SCNC: 107 MMOL/L (ref 97–108)
CO2 SERPL-SCNC: 28 MMOL/L (ref 21–32)
COMMENT:: NORMAL
CREAT SERPL-MCNC: 0.7 MG/DL (ref 0.55–1.02)
CRP SERPL-MCNC: 3.05 MG/DL (ref 0–0.3)
DIFFERENTIAL METHOD BLD: ABNORMAL
EKG ATRIAL RATE: 85 BPM
EKG DIAGNOSIS: NORMAL
EKG P AXIS: 56 DEGREES
EKG P-R INTERVAL: 154 MS
EKG Q-T INTERVAL: 388 MS
EKG QRS DURATION: 92 MS
EKG QTC CALCULATION (BAZETT): 461 MS
EKG R AXIS: 56 DEGREES
EKG T AXIS: 67 DEGREES
EKG VENTRICULAR RATE: 85 BPM
EOSINOPHIL # BLD: 0.12 K/UL (ref 0–0.4)
EOSINOPHIL NFR BLD: 1.5 % (ref 0–7)
ERYTHROCYTE [DISTWIDTH] IN BLOOD BY AUTOMATED COUNT: 12.9 % (ref 11.5–14.5)
ERYTHROCYTE [SEDIMENTATION RATE] IN BLOOD: 31 MM/HR (ref 0–30)
GLOBULIN SER CALC-MCNC: 4 G/DL (ref 2–4)
GLUCOSE SERPL-MCNC: 92 MG/DL (ref 65–100)
HCT VFR BLD AUTO: 39.1 % (ref 35–47)
HGB BLD-MCNC: 12.5 G/DL (ref 11.5–16)
IMM GRANULOCYTES # BLD AUTO: 0.03 K/UL (ref 0–0.04)
IMM GRANULOCYTES NFR BLD AUTO: 0.4 % (ref 0–0.5)
LYMPHOCYTES # BLD: 0.92 K/UL (ref 0.8–3.5)
LYMPHOCYTES NFR BLD: 11.5 % (ref 12–49)
MCH RBC QN AUTO: 30.4 PG (ref 26–34)
MCHC RBC AUTO-ENTMCNC: 32 G/DL (ref 30–36.5)
MCV RBC AUTO: 95.1 FL (ref 80–99)
MONOCYTES # BLD: 0.68 K/UL (ref 0–1)
MONOCYTES NFR BLD: 8.5 % (ref 5–13)
NEUTS SEG # BLD: 6.22 K/UL (ref 1.8–8)
NEUTS SEG NFR BLD: 77.6 % (ref 32–75)
NRBC # BLD: 0 K/UL (ref 0–0.01)
NRBC BLD-RTO: 0 PER 100 WBC
PLATELET # BLD AUTO: 330 K/UL (ref 150–400)
PMV BLD AUTO: 9.3 FL (ref 8.9–12.9)
POTASSIUM SERPL-SCNC: 3.4 MMOL/L (ref 3.5–5.1)
PROT SERPL-MCNC: 7.3 G/DL (ref 6.4–8.2)
RBC # BLD AUTO: 4.11 M/UL (ref 3.8–5.2)
SODIUM SERPL-SCNC: 138 MMOL/L (ref 136–145)
SPECIMEN HOLD: NORMAL
TROPONIN I SERPL HS-MCNC: 7 NG/L (ref 0–51)
WBC # BLD AUTO: 8 K/UL (ref 3.6–11)

## 2025-03-04 PROCEDURE — 71046 X-RAY EXAM CHEST 2 VIEWS: CPT

## 2025-03-04 PROCEDURE — 80053 COMPREHEN METABOLIC PANEL: CPT

## 2025-03-04 PROCEDURE — 85652 RBC SED RATE AUTOMATED: CPT

## 2025-03-04 PROCEDURE — 85025 COMPLETE CBC W/AUTO DIFF WBC: CPT

## 2025-03-04 PROCEDURE — 99285 EMERGENCY DEPT VISIT HI MDM: CPT

## 2025-03-04 PROCEDURE — 93010 ELECTROCARDIOGRAM REPORT: CPT | Performed by: INTERNAL MEDICINE

## 2025-03-04 PROCEDURE — 84484 ASSAY OF TROPONIN QUANT: CPT

## 2025-03-04 PROCEDURE — 72040 X-RAY EXAM NECK SPINE 2-3 VW: CPT

## 2025-03-04 PROCEDURE — 6370000000 HC RX 637 (ALT 250 FOR IP): Performed by: EMERGENCY MEDICINE

## 2025-03-04 PROCEDURE — 93005 ELECTROCARDIOGRAM TRACING: CPT | Performed by: EMERGENCY MEDICINE

## 2025-03-04 PROCEDURE — 86140 C-REACTIVE PROTEIN: CPT

## 2025-03-04 RX ORDER — BACLOFEN 10 MG/1
10 TABLET ORAL ONCE
Status: COMPLETED | OUTPATIENT
Start: 2025-03-04 | End: 2025-03-04

## 2025-03-04 RX ORDER — METHYLPREDNISOLONE 4 MG/1
TABLET ORAL
Qty: 1 KIT | Refills: 0 | Status: SHIPPED | OUTPATIENT
Start: 2025-03-04 | End: 2025-03-10

## 2025-03-04 RX ORDER — BACLOFEN 10 MG/1
10 TABLET ORAL 3 TIMES DAILY
Status: DISCONTINUED | OUTPATIENT
Start: 2025-03-04 | End: 2025-03-04

## 2025-03-04 RX ORDER — BACLOFEN 10 MG/1
10 TABLET ORAL 3 TIMES DAILY
Qty: 60 TABLET | Refills: 0 | Status: SHIPPED | OUTPATIENT
Start: 2025-03-04

## 2025-03-04 RX ORDER — METHYLPREDNISOLONE 4 MG/1
24 TABLET ORAL ONCE
Status: DISCONTINUED | OUTPATIENT
Start: 2025-03-04 | End: 2025-03-04

## 2025-03-04 RX ORDER — PREDNISONE 20 MG/1
40 TABLET ORAL DAILY
Status: DISCONTINUED | OUTPATIENT
Start: 2025-03-04 | End: 2025-03-04

## 2025-03-04 RX ADMIN — BACLOFEN 10 MG: 10 TABLET ORAL at 13:20

## 2025-03-04 ASSESSMENT — ENCOUNTER SYMPTOMS
COLOR CHANGE: 0
CHEST TIGHTNESS: 0
SINUS PRESSURE: 0
STRIDOR: 0
EYES NEGATIVE: 1
RHINORRHEA: 0
TROUBLE SWALLOWING: 0
SORE THROAT: 0
COUGH: 0
WHEEZING: 0
SHORTNESS OF BREATH: 0
ABDOMINAL PAIN: 0
DIARRHEA: 0
BLOOD IN STOOL: 0
BACK PAIN: 0
SINUS PAIN: 0
VOMITING: 0
NAUSEA: 0

## 2025-03-04 ASSESSMENT — PAIN DESCRIPTION - LOCATION: LOCATION: CHEST

## 2025-03-04 ASSESSMENT — PAIN DESCRIPTION - ORIENTATION: ORIENTATION: LEFT;RIGHT

## 2025-03-04 ASSESSMENT — PAIN DESCRIPTION - DESCRIPTORS: DESCRIPTORS: OTHER (COMMENT)

## 2025-03-04 ASSESSMENT — PAIN - FUNCTIONAL ASSESSMENT
PAIN_FUNCTIONAL_ASSESSMENT: PREVENTS OR INTERFERES SOME ACTIVE ACTIVITIES AND ADLS
PAIN_FUNCTIONAL_ASSESSMENT: 0-10

## 2025-03-04 ASSESSMENT — PAIN SCALES - GENERAL
PAINLEVEL_OUTOF10: 8
PAINLEVEL_OUTOF10: 0

## 2025-03-04 NOTE — DISCHARGE INSTRUCTIONS
Use the medications as directed.  Take the Medrol Dosepak until gone as directed.  Begin the baclofen after that and follow-up with your primary physician for further evaluation and treatment.  Warm moist compresses to the areas that are bothering you several times daily and follow-up again with rheumatology and your primary physician.

## 2025-03-04 NOTE — ED PROVIDER NOTES
rectal pain
were completed with a voice recognition program.  Efforts were made to edit the dictations but occasionally words are mis-transcribed.)    Ayad Amezcua MD (electronically signed)  Emergency Attending Physician            Ayad Amezcua MD  03/05/25 1940       Ayad Amezcua MD  03/05/25 1941

## 2025-03-04 NOTE — ED TRIAGE NOTES
Pt c/o pain across the upper part of her chest for a couple of weeks, having palpitations, +sob, denies fever, +cough, denies leg swelling , movement makes it worse , pt out of her medications for her RA

## 2025-03-19 SDOH — HEALTH STABILITY: PHYSICAL HEALTH: ON AVERAGE, HOW MANY MINUTES DO YOU ENGAGE IN EXERCISE AT THIS LEVEL?: 20 MIN

## 2025-03-19 SDOH — HEALTH STABILITY: PHYSICAL HEALTH: ON AVERAGE, HOW MANY DAYS PER WEEK DO YOU ENGAGE IN MODERATE TO STRENUOUS EXERCISE (LIKE A BRISK WALK)?: 3 DAYS

## 2025-03-21 ENCOUNTER — OFFICE VISIT (OUTPATIENT)
Age: 65
End: 2025-03-21
Payer: MEDICARE

## 2025-03-21 VITALS
RESPIRATION RATE: 18 BRPM | OXYGEN SATURATION: 96 % | TEMPERATURE: 97.5 F | HEART RATE: 75 BPM | BODY MASS INDEX: 25.27 KG/M2 | SYSTOLIC BLOOD PRESSURE: 135 MMHG | DIASTOLIC BLOOD PRESSURE: 81 MMHG | WEIGHT: 142.6 LBS | HEIGHT: 63 IN

## 2025-03-21 DIAGNOSIS — M05.79 SEROPOSITIVE RHEUMATOID ARTHRITIS OF MULTIPLE SITES (HCC): Primary | ICD-10-CM

## 2025-03-21 DIAGNOSIS — E87.6 HYPOKALEMIA: ICD-10-CM

## 2025-03-21 DIAGNOSIS — D50.8 IRON DEFICIENCY ANEMIA SECONDARY TO INADEQUATE DIETARY IRON INTAKE: ICD-10-CM

## 2025-03-21 DIAGNOSIS — Z12.31 ENCOUNTER FOR SCREENING MAMMOGRAM FOR BREAST CANCER: ICD-10-CM

## 2025-03-21 DIAGNOSIS — Z00.00 INITIAL MEDICARE ANNUAL WELLNESS VISIT: ICD-10-CM

## 2025-03-21 DIAGNOSIS — E55.9 VITAMIN D DEFICIENCY: ICD-10-CM

## 2025-03-21 DIAGNOSIS — E78.00 PURE HYPERCHOLESTEROLEMIA: ICD-10-CM

## 2025-03-21 PROBLEM — Z79.60 LONG-TERM USE OF IMMUNOSUPPRESSANT MEDICATION: Status: RESOLVED | Noted: 2017-12-27 | Resolved: 2025-03-21

## 2025-03-21 PROBLEM — M76.31 ILIOTIBIAL BAND SYNDROME OF RIGHT SIDE: Status: RESOLVED | Noted: 2019-05-24 | Resolved: 2025-03-21

## 2025-03-21 PROBLEM — M70.61 GREATER TROCHANTERIC BURSITIS OF RIGHT HIP: Status: RESOLVED | Noted: 2019-05-24 | Resolved: 2025-03-21

## 2025-03-21 PROCEDURE — 99214 OFFICE O/P EST MOD 30 MIN: CPT | Performed by: STUDENT IN AN ORGANIZED HEALTH CARE EDUCATION/TRAINING PROGRAM

## 2025-03-21 RX ORDER — UBIDECARENONE 75 MG
50 CAPSULE ORAL DAILY
COMMUNITY

## 2025-03-21 RX ORDER — MULTIVIT-MIN/IRON/FOLIC ACID/K 18-600-40
2000 CAPSULE ORAL DAILY
COMMUNITY

## 2025-03-21 RX ORDER — FERROUS GLUCONATE 324(38)MG
324 TABLET ORAL
COMMUNITY

## 2025-03-21 SDOH — ECONOMIC STABILITY: FOOD INSECURITY: WITHIN THE PAST 12 MONTHS, THE FOOD YOU BOUGHT JUST DIDN'T LAST AND YOU DIDN'T HAVE MONEY TO GET MORE.: NEVER TRUE

## 2025-03-21 SDOH — ECONOMIC STABILITY: FOOD INSECURITY: WITHIN THE PAST 12 MONTHS, YOU WORRIED THAT YOUR FOOD WOULD RUN OUT BEFORE YOU GOT MONEY TO BUY MORE.: NEVER TRUE

## 2025-03-21 ASSESSMENT — PATIENT HEALTH QUESTIONNAIRE - PHQ9
SUM OF ALL RESPONSES TO PHQ QUESTIONS 1-9: 0
1. LITTLE INTEREST OR PLEASURE IN DOING THINGS: NOT AT ALL
SUM OF ALL RESPONSES TO PHQ QUESTIONS 1-9: 0
2. FEELING DOWN, DEPRESSED OR HOPELESS: NOT AT ALL

## 2025-03-21 ASSESSMENT — LIFESTYLE VARIABLES
HOW OFTEN DO YOU HAVE A DRINK CONTAINING ALCOHOL: NEVER
HOW MANY STANDARD DRINKS CONTAINING ALCOHOL DO YOU HAVE ON A TYPICAL DAY: PATIENT DOES NOT DRINK

## 2025-03-21 NOTE — PATIENT INSTRUCTIONS
Tumeric  Please call central scheduling at [phone] to schedule your mammogram.        Advance Directives: Care Instructions  Overview  An advance directive is a legal way to state your wishes at the end of your life. It tells your family and your doctor what to do if you can't say what you want.  There are two main types of advance directives. You can change them any time your wishes change.  Living will.  This form tells your family and your doctor your wishes about life support and other treatment. The form is also called a declaration.  Medical power of .  This form lets you name a person to make treatment decisions for you when you can't speak for yourself. This person is called a health care agent (health care proxy, health care surrogate). The form is also called a durable power of  for health care.  If you do not have an advance directive, decisions about your medical care may be made by a family member, or by a doctor or a  who doesn't know you.  It may help to think of an advance directive as a gift to the people who care for you. If you have one, they won't have to make tough decisions by themselves.  For more information, including forms for your state, see the [employer] website (www.caringinfo.org/planning/advance-directives/).  Follow-up care is a key part of your treatment and safety. Be sure to make and go to all appointments, and call your doctor if you are having problems. It's also a good idea to know your test results and keep a list of the medicines you take.  What should you include in an advance directive?  Many states have a unique advance directive form. (It may ask you to address specific issues.) Or you might use a universal form that's approved by many states.  If your form doesn't tell you what to address, it may be hard to know what to include in your advance directive. Use the questions below to help you get started.  Who do you want to make decisions about

## 2025-03-22 LAB
25(OH)D3 SERPL-MCNC: 46.4 NG/ML (ref 30–100)
ANION GAP SERPL CALC-SCNC: 6 MMOL/L (ref 2–12)
BUN SERPL-MCNC: 21 MG/DL (ref 6–20)
BUN/CREAT SERPL: 31 (ref 12–20)
CALCIUM SERPL-MCNC: 9.3 MG/DL (ref 8.5–10.1)
CHLORIDE SERPL-SCNC: 103 MMOL/L (ref 97–108)
CHOLEST SERPL-MCNC: 227 MG/DL
CO2 SERPL-SCNC: 29 MMOL/L (ref 21–32)
CREAT SERPL-MCNC: 0.68 MG/DL (ref 0.55–1.02)
GLUCOSE SERPL-MCNC: 84 MG/DL (ref 65–100)
HDLC SERPL-MCNC: 81 MG/DL
HDLC SERPL: 2.8 (ref 0–5)
LDLC SERPL CALC-MCNC: 131.6 MG/DL (ref 0–100)
POTASSIUM SERPL-SCNC: 4 MMOL/L (ref 3.5–5.1)
SODIUM SERPL-SCNC: 138 MMOL/L (ref 136–145)
TRIGL SERPL-MCNC: 72 MG/DL
VLDLC SERPL CALC-MCNC: 14.4 MG/DL

## 2025-03-24 ENCOUNTER — RESULTS FOLLOW-UP (OUTPATIENT)
Age: 65
End: 2025-03-24

## 2025-03-24 NOTE — RESULT ENCOUNTER NOTE
BMP/vitamin D normal  LDL elevated, The 10-year ASCVD risk score (Henry WOODWARD, et al., 2019) is: 8.3% we will advised patient to continue working on lifestyle modifications and we will recheck in 6 months

## 2025-03-24 NOTE — PROGRESS NOTES
Chief Complaint   Patient presents with    New Patient     \"Have you been to the ER, urgent care clinic since your last visit?  Hospitalized since your last visit?\"    Yes. 3/4/25- [facility]- Cervical arthritis.     “Have you seen or consulted any other health care providers outside of [facility] since your last visit?”    NO     Have you had a mammogram?”   NO    Date of last Mammogram: 12/12/2017      “Have you had a pap smear?”    NO    Date of last Cervical Cancer screen (HPV or PAP): 1/25/2018        Financial Resource Strain: Not on file      Food Insecurity: No Food Insecurity (3/21/2025)    Hunger Vital Sign     Worried About Running Out of Food in the Last Year: Never true     Ran Out of Food in the Last Year: Never true            3/21/2025     1:24 PM   PHQ-9    Little interest or pleasure in doing things 0   Feeling down, depressed, or hopeless 0   PHQ-2 Score 0   PHQ-9 Total Score 0       Health Maintenance Due   Topic Date Due    Depression Screen  Never done    HIV screen  Never done    DTaP/Tdap/Td vaccine (1 - Tdap) Never done    Shingles vaccine (1 of 2) Never done    Breast cancer screen  12/12/2019    Cervical cancer screen  01/25/2023    Flu vaccine (1) Never done    COVID-19 Vaccine (1 - 2024-25 season) Never done    Annual Wellness Visit (Medicare Advantage)  Never done             
hearing problems.  - Her home is equipped with functional smoke detectors and she reports no tripping hazards.  - She always wears her seatbelt and is independent in her activities of daily living.  - She does not have a living will and is not .    Vitamin D Deficiency  - She has a history of vitamin D deficiency but is currently taking supplements 2 to 3 days per week.    Elevated Cholesterol  - She has a history of elevated cholesterol but is unsure of her current status.    Supplemental information: She contracted COVID-19 while in [place] and was isolated there.    SOCIAL HISTORY  The patient used to smoke cigarettes but quit in 2012. She does not drink alcohol or use drugs.    ALLERGIES  - No known allergies    MEDICATIONS  - Current: Tylenol  - Current: B12  - Current: Iron  - Current: Vitamin D  - Current: Magnesium  - Past: Prednisone  - Past: Methotrexate    IMMUNIZATIONS  The patient received the COVID-19 vaccine.    Patient's complete Health Risk Assessment and screening values have been reviewed and are found in Flowsheets. The following problems were reviewed today and where indicated follow up appointments were made and/or referrals ordered.    Positive Risk Factor Screenings with Interventions:                      Advanced Directives:  Do you have a Living Will?: (!) No    Intervention:  has NO advanced directive - information provided                     Objective   Vitals:    03/21/25 1334   BP: 135/81   BP Site: Left Upper Arm   Patient Position: Sitting   BP Cuff Size: Medium Adult   Pulse: 75   Resp: 18   Temp: 97.5 °F (36.4 °C)   TempSrc: Temporal   SpO2: 96%   Weight: 64.7 kg (142 lb 9.6 oz)   Height: 1.6 m (5' 3\")      Body mass index is 25.26 kg/m².        Physical Exam  General Appearance: Normal.  Vital signs: Within normal limits.  HEENT: Within normal limits.  Respiratory: Lungs are clear to auscultation bilaterally.  Cardiovascular: Heart has a normal rate and regular

## 2025-03-27 ENCOUNTER — TELEPHONE (OUTPATIENT)
Age: 65
End: 2025-03-27

## 2025-03-27 NOTE — TELEPHONE ENCOUNTER
PT called needs call back (@ [phone])  about an referral for a nutritionist and recommendations  [phone]

## 2025-03-27 NOTE — TELEPHONE ENCOUNTER
I contacted this patient, her name &  were verified.  Patient said her insurance said PCP should refer her to a nutritionist.

## (undated) DEVICE — NDL HYPO RW/BVL 25GX1IN --

## (undated) DEVICE — GAUZE,SPONGE,2"X2",8PLY,STERILE,LF,2'S: Brand: MEDLINE

## (undated) DEVICE — CORDLESS ULTRASONIC DISSECTOR: Brand: SONICISION

## (undated) DEVICE — 3M™ TEGADERM™ TRANSPARENT FILM DRESSING FRAME STYLE, 1624W, 2-3/8 IN X 2-3/4 IN (6 CM X 7 CM), 100/CT 4CT/CASE: Brand: 3M™ TEGADERM™

## (undated) DEVICE — REM POLYHESIVE ADULT PATIENT RETURN ELECTRODE: Brand: VALLEYLAB

## (undated) DEVICE — Device

## (undated) DEVICE — SOLIDIFIER FLUID 3000 CC ABSORB

## (undated) DEVICE — TROCAR: Brand: KII® OPTICAL ACCESS SYSTEM

## (undated) DEVICE — SYRINGE MED 20ML STD CLR PLAS LUERLOCK TIP N CTRL DISP

## (undated) DEVICE — CANN NASAL O2 CAPNOGRAPHY AD -- FILTERLINE

## (undated) DEVICE — KENDALL RADIOLUCENT FOAM MONITORING ELECTRODE -RECTANGULAR SHAPE: Brand: KENDALL

## (undated) DEVICE — Z DISCONTINUED NO SUB IDED SET EXTN W/ 4 W STPCOCK M SPIN LOK 36IN

## (undated) DEVICE — STERILE POLYISOPRENE POWDER-FREE SURGICAL GLOVES WITH EMOLLIENT COATING: Brand: PROTEXIS

## (undated) DEVICE — SUTURE MCRYL SZ 4-0 L27IN ABSRB UD L19MM PS-2 1/2 CIR PRIM Y426H

## (undated) DEVICE — DEVICE SUT 0 L48IN GRN POLY BRAID LD UNIT DISP SURGDAC

## (undated) DEVICE — INFECTION CONTROL KIT SYS

## (undated) DEVICE — 4-PORT MANIFOLD: Brand: NEPTUNE 2

## (undated) DEVICE — TROCAR: Brand: KII FIOS FIRST ENTRY

## (undated) DEVICE — TOTAL TRAY, DB, 100% SILI FOLEY, 16FR 10: Brand: MEDLINE

## (undated) DEVICE — NEONATAL-ADULT SPO2 SENSOR: Brand: NELLCOR

## (undated) DEVICE — SURGICAL PROCEDURE KIT GEN LAPAROSCOPY LF

## (undated) DEVICE — AIRLIFE™ U/CONNECT-IT OXYGEN TUBING 7 FEET (2.1 M) CRUSH-RESISTANT OXYGEN TUBING, VINYL TIPPED: Brand: AIRLIFE™

## (undated) DEVICE — NEEDLE HYPO 18GA L1.5IN PNK S STL HUB POLYPR SHLD REG BVL

## (undated) DEVICE — KIT IV STRT W CHLORAPREP PD 1ML

## (undated) DEVICE — GARMENT,MEDLINE,DVT,INT,CALF,MED, GEN2: Brand: MEDLINE

## (undated) DEVICE — CATH IV AUTOGRD BC BLU 22GA 25 -- INSYTE

## (undated) DEVICE — APPLIER CLP M/L SHFT DIA5MM 15 LIG LIGAMAX 5

## (undated) DEVICE — BAG BELONG PT PERS CLEAR HANDL

## (undated) DEVICE — 1200 GUARD II KIT W/5MM TUBE W/O VAC TUBE: Brand: GUARDIAN

## (undated) DEVICE — SUTURING DEVICE: Brand: ENDO STITCH

## (undated) DEVICE — BW-412T DISP COMBO CLEANING BRUSH: Brand: SINGLE USE COMBINATION CLEANING BRUSH

## (undated) DEVICE — CONNECTOR TBNG AUX H2O JET DISP FOR OLY 160/180 SER

## (undated) DEVICE — TROCAR: Brand: KII SLEEVE

## (undated) DEVICE — Z DISCONTINUED USE 2751540 TUBING IRRIG L10IN DISP PMP ENDOGATOR

## (undated) DEVICE — ENDO CARRY-ON PROCEDURE KIT INCLUDES ENZYMATIC SPONGE, GAUZE, BIOHAZARD LABEL, TRAY, LUBRICANT, DIRTY SCOPE LABEL, WATER LABEL, TRAY, DRAWSTRING PAD, AND DEFENDO 4-PIECE KIT.: Brand: ENDO CARRY-ON PROCEDURE KIT

## (undated) DEVICE — STRAP,POSITIONING,KNEE/BODY,FOAM,4X60": Brand: MEDLINE

## (undated) DEVICE — MASTISOL ADHESIVE LIQ 2/3ML

## (undated) DEVICE — TROCAR: Brand: KII® SLEEVE

## (undated) DEVICE — STRIP,CLOSURE,WOUND,MEDI-STRIP,1/2X4: Brand: MEDLINE

## (undated) DEVICE — SUTURE SZ 0 27IN 5/8 CIR UR-6  TAPER PT VIOLET ABSRB VICRYL J603H

## (undated) DEVICE — SET ADMIN 16ML TBNG L100IN 2 Y INJ SITE IV PIGGY BK DISP

## (undated) DEVICE — PREP SKN CHLRAPRP APL 26ML STR --

## (undated) DEVICE — QUILTED PREMIUM COMFORT UNDERPAD,EXTRA HEAVY: Brand: WINGS